# Patient Record
Sex: FEMALE | Race: WHITE | NOT HISPANIC OR LATINO | Employment: FULL TIME | URBAN - NONMETROPOLITAN AREA
[De-identification: names, ages, dates, MRNs, and addresses within clinical notes are randomized per-mention and may not be internally consistent; named-entity substitution may affect disease eponyms.]

---

## 2022-12-14 NOTE — PROGRESS NOTES
[facility] HEMATOLOGY & ONCOLOGY [address]  [facility] [address] [phone]    Patient Name: Tiff Elizalde  Encounter Date: 12/15/2022  YOB: 1965  Patient Number: [id number]    Initial Note    REASON FOR CONSULTATION:Tiff Elizalde is a pleasant 57 y.o.  female referred by HAILEE Walsh for diagnostic and management recommendations for ovarian cancer, left. She is seen to start adjuvant paclitaxel and carboplatin.  She is seen with spouse Dennis. History is obtained from patient. History is considered to be accurate.      HISTORY OF PRESENT ILLNESS: Pain lower back and left leg swelling.    She presented to her PCP.     CT scan done at [facility]. Details not available.    Patient seen by Dr. Virgilio Hayward 11/23/2022. Patient found to have a large pelvic mass causing bilateral hydronephrosis with resultant nonfunctioning left kidney, elevated creatinine, left deep venous thrombosis and pulmonary embolus.     IVC filter was placed 11/22/2022 at [facility].    She had undergone exploratory laparotomy, total abdominal hysterectomy, bilateral salpingo-oophorectomy, debulking, left pelvic lymph node dissection and omentectomy by Dr. Hayward on 11/23/2022.  Estimated blood loss 400 ml.  Laparotomy showed a massive mass arising from the left ovary, retroperitoneal, infiltrating the retroperitoneal space and adherent to the sigmoid mesentery.  Within the retroperitoneum it was densely adherent to the iliac vessel on the left side and ureter.  Deliberate rupture of the mass was required in order to access the retroperitoneal attachment.  The mass was sent for frozen section and determined to be at least borderline tumor possibly low-grade serous tumor.  There was no other evidence of disease in the pelvis or upper abdomen.  Due to being densely adherent to the retroperitoneum and iliac vessels, it is unclear  whether the entire tumor was removed or not and the pelvic sidewall biopsy was taken to determine whether the residual ovary was present and not a fibrotic reaction.  Bilateral ureterolysis was required.       Pathology report 11/28/2022.  Fallopian tube and ovary, left salpingo-oophorectomy: Fallopian tube with no evidence of involvement by malignancy.  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  No surface involvement demonstrated.  Uterus, fallopian tube and ovary, hysterectomy with right salpingo-oophorectomy: Cervix and endocervix with no evidence of squamous or glandular dysplasia.  Chronic cervicitis present.  Weakly proliferative endometrium, negative for hyperplasia, carcinoma, and endometritis.  Benign endometrial polyp present.  Adenomyosis.  No leiomyomata.  Right fallopian tube with focal involvement by serous neoplasm with Samona by this most consistent with low-grade serous carcinoma.  Remnant left ovary with adhesions to uterine serosa and focal involvement by low-grade serous carcinoma.  Right ovary with surface involvement by low-grade serous carcinoma.  Omentum biopsy: Involved by low-grade serous carcinoma, invasive).  Sidewall, left pelvic biopsy: Edema with hemorrhage and chronic inflammation, negative for endometriosis and malignancy.  Lymph node left pelvic biopsy: 1 lymph node with a 0.9 mm focus of metastatic serous carcinoma surrounding adipose tissue with hemorrhage and edema.       She has no prior malignancy and no family history of malignancy.      With above background, she is seen.        LABS    No results for input(s): HGB, HCT, MCV, WBC, RDW, MPV, PLT, AUTOIGPER, NEUTROABS, LYMPHSABS, MONOSABS, EOSABS, BASOSABS, AUTOIGNUM, NRBC, NEUTRELPCTM, MONOPCT, BASOPCT, ATYLMPCT, ANISOCYTOSIS, GIANTPLTS in the last 93736 hours.    Invalid input(s): LYMPHOCPCT, ABCMORPH    No results for input(s): GLUCOSE, NA, K, CO2, CL, ANIONGAP, CREATININE, BUN,  BCR, CALCIUM, EGFRIFNONA, ALKPHOS, PROTEINTOT, ALT, AST, BILITOT, ALBUMIN, GLOB in the last 12289 hours.    Invalid input(s): LABIL    No results for input(s): MSPIKE, KAPPALAMB, IGLFLC, URICACID, FREEKAPPAL, CEA, LDH, REFLABREPO in the last 97318 hours.    No results for input(s): IRON, TIBC, LABIRON, FERRITIN, H9KEMAO, TSH, FOLATE in the last 68462 hours.    Invalid input(s): VITB12      PAST MEDICAL HISTORY:  ALLERGIES:  No Known Allergies  CURRENT MEDICATIONS:  Outpatient Encounter Medications as of 12/15/2022   Medication Sig Dispense Refill   • Calcium Carbonate-Vit D-Min (CALCIUM 1200 PO) Take  by mouth.     • Eliquis 5 MG tablet tablet      • lisinopril-hydrochlorothiazide (PRINZIDE,ZESTORETIC) 10-12.5 MG per tablet Take 1 tablet by mouth Daily.     • vitamin D3 125 MCG (5000 UT) capsule capsule Take  by mouth Daily.       No facility-administered encounter medications on file as of 12/15/2022.     ADULT ILLNESSES:Hypetension.   Patient Active Problem List   Diagnosis Code   • Ovarian cancer (HCC) C56.9     SURGERIES:Tooth extraction.   No past surgical history on file.  HEALTH MAINTENANCE ITEMS:  Health Maintenance Due   Topic Date Due   • MAMMOGRAM  Never done   • COLORECTAL CANCER SCREENING  Never done   • COVID-19 Vaccine (1) Never done   • TDAP/TD VACCINES (2 - Tdap) 02/24/2008   • ZOSTER VACCINE (1 of 2) Never done   • INFLUENZA VACCINE  Never done   • HEPATITIS C SCREENING  Never done   • PAP SMEAR  Never done       <no information>  Last Completed Colonoscopy     This patient has no relevant Health Maintenance data.          There is no immunization history on file for this patient.  Last Completed Mammogram     This patient has no relevant Health Maintenance data.            FAMILY HISTORY:  No family history on file.  SOCIAL HISTORY:  Social History     Socioeconomic History   • Marital status:    Tobacco Use   • Smoking status: Never   Substance and Sexual Activity   • Alcohol use: Never  "  • Drug use: Never   • Sexual activity: Defer       REVIEW OF SYSTEMS:  Review of Systems   Constitutional: Positive for fatigue. Negative for chills and fever.   HENT: Negative for congestion, mouth sores and trouble swallowing.    Eyes: Negative for redness and visual disturbance.   Respiratory: Negative for cough, shortness of breath and wheezing.    Cardiovascular: Positive for leg swelling. Negative for chest pain.   Gastrointestinal: Negative for abdominal pain, nausea and vomiting.   Endocrine: Negative for polydipsia and polyphagia.   Genitourinary: Negative for difficulty urinating, dysuria and flank pain.   Musculoskeletal: Negative for gait problem and joint swelling.   Skin: Positive for pallor.   Allergic/Immunologic: Negative for food allergies.   Neurological: Negative for speech difficulty, weakness and confusion.   Hematological: Negative for adenopathy. Does not bruise/bleed easily.   Psychiatric/Behavioral: Negative for agitation and behavioral problems. The patient is not nervous/anxious.        /66   Pulse 82   Temp 97.6 °F (36.4 °C)   Resp 18   Ht 170.2 cm (67\")   Wt 79.4 kg (175 lb)   SpO2 92%   Breastfeeding No   BMI 27.41 kg/m²  Body surface area is 1.91 meters squared.  Pain Score    12/15/22 1328   PainSc: 0-No pain       Physical Exam:  Physical Exam  Vitals reviewed.   Constitutional:       General: She is not in acute distress.  HENT:      Head: Normocephalic and atraumatic.   Eyes:      General: No scleral icterus.  Pulmonary:      Effort: No respiratory distress.      Breath sounds: No wheezing or rales.   Abdominal:      General: Bowel sounds are normal.      Palpations: Abdomen is soft.      Tenderness: There is no abdominal tenderness.      Comments: Laparotomy scar.    Musculoskeletal:      Cervical back: Neck supple.      Comments: 2 + left leg edema.   Skin:     General: Skin is warm.      Coloration: Skin is pale.   Neurological:      Mental Status: She is alert " "and oriented to person, place, and time.   Psychiatric:         Mood and Affect: Mood normal.         Behavior: Behavior normal.         Thought Content: Thought content normal.         Judgment: Judgment normal.         Tiff Elizalde reports a pain score of 0.       ASSESSMENT:  1.  Ovarian cancer, low-grade serous carcinoma, left.  Tumor size 18 cm  AJCC stage:IIIA1 (pT3, pN1, cM0, G1)  Treatment status:Adjuvant Taxol and carboplatin pending.  2.  Performance status of 1.  3.  Left deep venous thrombosis from malignancy. On Eliquis.   4.  Pulm embolism from malignancy. On Eliquis.  Post IVC filter 11/22/2022.         PLAN:  1.    Re: Reason for the referral.   2.    Re: Role of adjuvant paclitaxel and carboplatin for 6 cycles  3.    Re: Port placement.   4.   Aware of potential adverse effects of chemo especially anaphylaxis, infusion reactions, nausea, vomiting, cytopenias, neuropathy, and alopecia.  5.   Port by Dr. Mcgill.  6.   Blood for CBC with differential, , CMP, and magnesium.  7.   Chemo education.  8.   Schedule chemo every 21 days after port.  Plan for 6 cycles.    Carboplatin AUC = 6.  Taxol 175 mg/m2 over 3 hours.   9.    Premedicate with:  Aloxi 0.25 mg IV  Emend 150 mg IV  Decadron 12 mg IV  Pepcid 20 mg IV  Benadryl 50 mg IV  10. Neulasta Onpro 6 mg D1 of chemo as primary prophylaxis. High risk for febrile neutropenia.    11.  Weekly CBC with differential, CMP, and magnesium once she starts chemo.  12.  eRx for Zofran 8 mg p.o. every 8 hours as needed for nausea and vomiting #60, 2 refills.  13.  eRx for Compazine 10 mg p.o. every 4 hours as needed for nausea and vomiting #60, 2 refills.  14.  Continue ongoing management per primary care physician and other specialists.  15.  Plan of care discussed with patient and spouse.  Understanding expressed.  Patient agreeable to proceed.  16.  Questions were answered to her satisfaction. \"Okay.\"  17.  Return to office in 4 " weeks.  18.  Further recommendations pending.   19.  Obtain CT scans from [facility].        Thank you for the referral.          I have reviewed the assessment and plan and verified the accuracy of it. No changes to assessment and plan since the information was documented. Walker Magana MD 12/15/22              I spent 67 total minutes, face-to-face, caring for Tiff today.  Greater than 50% of this time involved counseling and/or coordination of care as documented within this note regarding the patient's illness(es), pros and cons of various treatment options, instructions and/or risk reduction.           MD Virgilio Reyes MD Jonathan Maddux, MD

## 2022-12-15 ENCOUNTER — TELEPHONE (OUTPATIENT)
Dept: ONCOLOGY | Facility: CLINIC | Age: 57
End: 2022-12-15

## 2022-12-15 ENCOUNTER — LAB (OUTPATIENT)
Dept: LAB | Facility: HOSPITAL | Age: 57
End: 2022-12-15

## 2022-12-15 ENCOUNTER — CONSULT (OUTPATIENT)
Dept: ONCOLOGY | Facility: CLINIC | Age: 57
End: 2022-12-15

## 2022-12-15 VITALS
HEIGHT: 67 IN | DIASTOLIC BLOOD PRESSURE: 66 MMHG | SYSTOLIC BLOOD PRESSURE: 138 MMHG | OXYGEN SATURATION: 92 % | TEMPERATURE: 97.6 F | WEIGHT: 175 LBS | RESPIRATION RATE: 18 BRPM | BODY MASS INDEX: 27.47 KG/M2 | HEART RATE: 82 BPM

## 2022-12-15 DIAGNOSIS — D50.0 IRON DEFICIENCY ANEMIA DUE TO CHRONIC BLOOD LOSS: Primary | ICD-10-CM

## 2022-12-15 DIAGNOSIS — C56.2 MALIGNANT NEOPLASM OF LEFT OVARY: Primary | ICD-10-CM

## 2022-12-15 DIAGNOSIS — C56.2 MALIGNANT NEOPLASM OF LEFT OVARY: ICD-10-CM

## 2022-12-15 PROBLEM — C56.9 OVARIAN CANCER: Status: ACTIVE | Noted: 2022-12-15

## 2022-12-15 LAB
ALBUMIN SERPL-MCNC: 3.9 G/DL (ref 3.5–5.2)
ALBUMIN/GLOB SERPL: 1.1 G/DL
ALP SERPL-CCNC: 79 U/L (ref 39–117)
ALT SERPL W P-5'-P-CCNC: 9 U/L (ref 1–33)
ANION GAP SERPL CALCULATED.3IONS-SCNC: 10 MMOL/L (ref 5–15)
AST SERPL-CCNC: 12 U/L (ref 1–32)
BASOPHILS # BLD AUTO: 0.06 10*3/MM3 (ref 0–0.2)
BASOPHILS NFR BLD AUTO: 0.6 % (ref 0–1.5)
BILIRUB SERPL-MCNC: 0.3 MG/DL (ref 0–1.2)
BUN SERPL-MCNC: 22 MG/DL (ref 6–20)
BUN/CREAT SERPL: 13 (ref 7–25)
CALCIUM SPEC-SCNC: 10 MG/DL (ref 8.6–10.5)
CHLORIDE SERPL-SCNC: 104 MMOL/L (ref 98–107)
CO2 SERPL-SCNC: 28 MMOL/L (ref 22–29)
CREAT SERPL-MCNC: 1.69 MG/DL (ref 0.57–1)
DEPRECATED RDW RBC AUTO: 44 FL (ref 37–54)
EGFRCR SERPLBLD CKD-EPI 2021: 34.6 ML/MIN/1.73
EOSINOPHIL # BLD AUTO: 0.18 10*3/MM3 (ref 0–0.4)
EOSINOPHIL NFR BLD AUTO: 1.9 % (ref 0.3–6.2)
ERYTHROCYTE [DISTWIDTH] IN BLOOD BY AUTOMATED COUNT: 13.2 % (ref 12.3–15.4)
GLOBULIN UR ELPH-MCNC: 3.6 GM/DL
GLUCOSE SERPL-MCNC: 106 MG/DL (ref 65–99)
HCT VFR BLD AUTO: 36.7 % (ref 34–46.6)
HGB BLD-MCNC: 11.4 G/DL (ref 12–15.9)
IMM GRANULOCYTES # BLD AUTO: 0.04 10*3/MM3 (ref 0–0.05)
IMM GRANULOCYTES NFR BLD AUTO: 0.4 % (ref 0–0.5)
LYMPHOCYTES # BLD AUTO: 2.22 10*3/MM3 (ref 0.7–3.1)
LYMPHOCYTES NFR BLD AUTO: 23 % (ref 19.6–45.3)
MAGNESIUM SERPL-MCNC: 2.1 MG/DL (ref 1.6–2.6)
MCH RBC QN AUTO: 28.1 PG (ref 26.6–33)
MCHC RBC AUTO-ENTMCNC: 31.1 G/DL (ref 31.5–35.7)
MCV RBC AUTO: 90.6 FL (ref 79–97)
MONOCYTES # BLD AUTO: 0.79 10*3/MM3 (ref 0.1–0.9)
MONOCYTES NFR BLD AUTO: 8.2 % (ref 5–12)
NEUTROPHILS NFR BLD AUTO: 6.37 10*3/MM3 (ref 1.7–7)
NEUTROPHILS NFR BLD AUTO: 65.9 % (ref 42.7–76)
NRBC BLD AUTO-RTO: 0 /100 WBC (ref 0–0.2)
PLATELET # BLD AUTO: 313 10*3/MM3 (ref 140–450)
PMV BLD AUTO: 10.2 FL (ref 6–12)
POTASSIUM SERPL-SCNC: 4.2 MMOL/L (ref 3.5–5.2)
PROT SERPL-MCNC: 7.5 G/DL (ref 6–8.5)
RBC # BLD AUTO: 4.05 10*6/MM3 (ref 3.77–5.28)
SODIUM SERPL-SCNC: 142 MMOL/L (ref 136–145)
WBC NRBC COR # BLD: 9.66 10*3/MM3 (ref 3.4–10.8)

## 2022-12-15 PROCEDURE — 86304 IMMUNOASSAY TUMOR CA 125: CPT

## 2022-12-15 PROCEDURE — 80053 COMPREHEN METABOLIC PANEL: CPT | Performed by: INTERNAL MEDICINE

## 2022-12-15 PROCEDURE — 83735 ASSAY OF MAGNESIUM: CPT | Performed by: INTERNAL MEDICINE

## 2022-12-15 PROCEDURE — 36415 COLL VENOUS BLD VENIPUNCTURE: CPT

## 2022-12-15 PROCEDURE — 85025 COMPLETE CBC W/AUTO DIFF WBC: CPT | Performed by: INTERNAL MEDICINE

## 2022-12-15 PROCEDURE — 99205 OFFICE O/P NEW HI 60 MIN: CPT | Performed by: INTERNAL MEDICINE

## 2022-12-15 RX ORDER — APIXABAN 5 MG/1
5 TABLET, FILM COATED ORAL EVERY 12 HOURS SCHEDULED
COMMUNITY
Start: 2022-11-28

## 2022-12-15 RX ORDER — LISINOPRIL AND HYDROCHLOROTHIAZIDE 12.5; 1 MG/1; MG/1
1 TABLET ORAL DAILY
COMMUNITY
End: 2022-12-28

## 2022-12-15 RX ORDER — PROCHLORPERAZINE MALEATE 10 MG
10 TABLET ORAL EVERY 6 HOURS PRN
Qty: 60 TABLET | Refills: 2 | Status: SHIPPED | OUTPATIENT
Start: 2022-12-15

## 2022-12-15 RX ORDER — ONDANSETRON HYDROCHLORIDE 8 MG/1
8 TABLET, FILM COATED ORAL EVERY 8 HOURS PRN
Qty: 60 TABLET | Refills: 2 | Status: SHIPPED | OUTPATIENT
Start: 2022-12-15

## 2022-12-16 LAB — CANCER AG125 SERPL QL: 235 U/ML (ref 0–38.1)

## 2022-12-16 RX ORDER — LISINOPRIL AND HYDROCHLOROTHIAZIDE 20; 12.5 MG/1; MG/1
1 TABLET ORAL DAILY
COMMUNITY

## 2022-12-20 ENCOUNTER — OFFICE VISIT (OUTPATIENT)
Dept: SURGERY | Facility: CLINIC | Age: 57
End: 2022-12-20

## 2022-12-20 ENCOUNTER — CLINICAL SUPPORT (OUTPATIENT)
Dept: ONCOLOGY | Facility: CLINIC | Age: 57
End: 2022-12-20

## 2022-12-20 VITALS
BODY MASS INDEX: 27.47 KG/M2 | SYSTOLIC BLOOD PRESSURE: 118 MMHG | DIASTOLIC BLOOD PRESSURE: 71 MMHG | HEIGHT: 67 IN | HEART RATE: 97 BPM | OXYGEN SATURATION: 99 % | TEMPERATURE: 98 F | WEIGHT: 175 LBS

## 2022-12-20 DIAGNOSIS — C56.9 MALIGNANT NEOPLASM OF OVARY, UNSPECIFIED LATERALITY: Primary | ICD-10-CM

## 2022-12-20 DIAGNOSIS — I82.4Y9 DEEP VEIN THROMBOSIS (DVT) OF PROXIMAL LOWER EXTREMITY, UNSPECIFIED CHRONICITY, UNSPECIFIED LATERALITY: ICD-10-CM

## 2022-12-20 DIAGNOSIS — C56.2 MALIGNANT NEOPLASM OF LEFT OVARY: ICD-10-CM

## 2022-12-20 DIAGNOSIS — I26.99 PULMONARY EMBOLISM WITHOUT ACUTE COR PULMONALE, UNSPECIFIED CHRONICITY, UNSPECIFIED PULMONARY EMBOLISM TYPE: ICD-10-CM

## 2022-12-20 PROCEDURE — 99203 OFFICE O/P NEW LOW 30 MIN: CPT | Performed by: SPECIALIST

## 2022-12-20 RX ORDER — IBUPROFEN 800 MG/1
TABLET ORAL
COMMUNITY
Start: 2022-11-28 | End: 2022-12-28

## 2022-12-20 NOTE — PROGRESS NOTES
Subjective     PATIENT NAME:  Tiff Elizalde  YOB: 1965  PATIENTS AGE:  57 y.o.  PATIENTS SEX:  female  DATE OF SERVICE:  12/20/2022  PROVIDER:  [facility] PAD NURSE      ____________________PATIENT EDUCATION____________________    PATIENT EDUCATION:  Today I met with the patient Tiff and  Dennis to discuss the chemotherapy regimen Carboplatin, Taxol, Neulasta recommended for treatment of her disease Ovarian Cancer.  The patient was given explanation of treatment premed side effects including office policy that prohibits patients to drive if sedating medications are administered, MD explanation given regarding benefits, side effects, toxicities and goals of treatment.  The patient received a Chemotherapy/Biotherapy Plan Summary including diagnosis and specific treatment plan.    SIDE EFFECTS:  Common side effects were discussed with the patient and/or significant other.  Discussion included hair loss/discoloration, anemia/fatigue, infection/chills/fever, appetite, bleeding risk/precautions, constipation, diarrhea, mouth sores, taste alteration, loss of appetite,nausea/vomiting, peripheral neuropathy, skin/nail changes, rash, muscle aches/weakness, photosensitivity, weight gain/loss, hearing loss, dizziness, sterility, high blood pressure, heart damage, liver damage, lung damage, kidney damage, DVT/PE risk, fluid retention, pleural/pericardial effusion, somnolence, electrolyte/LFT imbalance, vein exercises and/or the possible need for vascular access/port placement.  The patient was advice that although uncommon, leakage of an infused medication from the vein or venous access device (port) may lead to skin breakdown and/or other tissue damage.  The patient was advised that he/she may have pain, bleeding, and/or bruising from the insertion of a needle in their vein or venous access device (port).  The patient was further advised that, in spite of proper technique, infection with redness and  irritation may rarely occur at the site where the needle was inserted.  The patient was advised that if complications occur, additional medical treatment is available.    Discussion also included side effects specific to drugs in the treatment plan, specifically: Carboplatin/Taxol, Neulasta      protection during sexual relations.    A total of  60  minutes were spent with the patient, with 100% of time spent in education and counseling.
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
## 2022-12-20 NOTE — PROGRESS NOTES
Patient: Tiff Elizalde    YOB: 1965    Date: 12/20/2022    Primary Care Provider: Tyrone Garza MD    Chief Complaint   Patient presents with   • Port Placement        Subjective .     History of present illness:   Patient was having swelling in her left leg for which he underwent a CT scan which showed a large pelvic mass impinging on the left kidney causing hydronephrosis.  She ultimately underwent resection for low-grade ovarian cancer.  She had a DVT with pulmonary embolus as well.  She has an IVC filter in place and is on Eliquis.  I have asked to evaluate for a port placement    The following portions of the patient's history were reviewed and updated as appropriate: allergies, current medications, past family history, past medical history, past social history, past surgical history and problem list.      History:  Past Medical History:   Diagnosis Date   • Ovarian cancer (HCC) 12/15/2022        History reviewed. No pertinent surgical history.    History reviewed. No pertinent family history.    Social History     Tobacco Use   • Smoking status: Never   Substance Use Topics   • Alcohol use: Never   • Drug use: Never       Allergies:  No Known Allergies    Medications:     Current Outpatient Medications:   •  Calcium Carbonate-Vit D-Min (CALCIUM 1200 PO), Take  by mouth., Disp: , Rfl:   •  Eliquis 5 MG tablet tablet, , Disp: , Rfl:   •  ibuprofen (ADVIL,MOTRIN) 800 MG tablet, , Disp: , Rfl:   •  lisinopril-hydrochlorothiazide (PRINZIDE,ZESTORETIC) 10-12.5 MG per tablet, Take 1 tablet by mouth Daily., Disp: , Rfl:   •  lisinopril-hydrochlorothiazide (PRINZIDE,ZESTORETIC) 20-12.5 MG per tablet, Take 1 tablet by mouth Daily., Disp: , Rfl:   •  ondansetron (Zofran) 8 MG tablet, Take 1 tablet by mouth Every 8 (Eight) Hours As Needed for Nausea or Vomiting., Disp: 60 tablet, Rfl: 2  •  prochlorperazine (COMPAZINE) 10 MG tablet, Take 1 tablet by mouth Every 6 (Six) Hours As Needed for Nausea.,  "Disp: 60 tablet, Rfl: 2  •  vitamin D3 125 MCG (5000 UT) capsule capsule, Take  by mouth Daily., Disp: , Rfl:     Objective     Vital Signs:   Vitals:    12/20/22 1205   BP: 118/71   Pulse: 97   Temp: 98 °F (36.7 °C)   SpO2: 99%   Weight: 79.4 kg (175 lb)   Height: 170.2 cm (67\")       Physical Exam:     General Appearance:    Alert, cooperative, in no acute distress   Head:    Normocephalic, without obvious abnormality, atraumatic   Eyes:            Lids and lashes normal, conjunctivae and sclerae normal, no  icterus, no pallor, corneas clear,   Ears:    Ears appear intact with no abnormalities noted   Breast:     deferred   Lungs:     Clear to auscultation,respirations regular, even and              Unlabored    Heart:    Regular rhythm and normal rate, no murmur, no gallop.   Chest Wall:    No abnormalities observed   Abdomen    Rectal:    Soft nontender    Deferred   Extremities:  Does have swelling asymmetric left leg more than right   Pulses:   Pulses palpable and equal bilaterally   Skin:   No bleeding, bruising or rash   Lymph nodes:   No palpable adenopathy   Neurologic:   Cranial nerves 2 - 12 grossly intact.         Results Review:   I reviewed the patient's new clinical results.        Assessment / Plan:    Diagnoses and all orders for this visit:    1. Malignant neoplasm of ovary, unspecified laterality (HCC) (Primary)  -     Case Request; Standing  -     Comprehensive metabolic panel; Future  -     Lipase; Future  -     XR chest 1 vw; Future  -     ceFAZolin (ANCEF) 2 g in sodium chloride 0.9 % 100 mL IVPB  -     Case Request    2. Deep vein thrombosis (DVT) of proximal lower extremity, unspecified chronicity, unspecified laterality (HCC)    3. Pulmonary embolism without acute cor pulmonale, unspecified chronicity, unspecified pulmonary embolism type (HCC)    Other orders  -     Follow Anesthesia Guidelines / Protocol; Future  -     Obtain Informed Consent; Future  -     Provide NPO Instructions to " Patient; Future  -     Chlorhexidine Skin Prep; Future  -     Follow Anesthesia Guidelines / Protocol; Standing  -     Verify / Perform Chlorhexidine Skin Prep; Standing  -     Verify / Perform Chlorhexidine Skin Prep if Indicated (If Not Already Completed); Standing  -     Oxygen Therapy-; Standing  -     Notify physician (specify); Standing        BMI is >= 25 and <30. (Overweight) The following options were offered after discussion;: none (medical contraindication)    Plan she needs a port for chemotherapy.  She has been referred by Dr. Magana.  We will proceed with port placement in the OR next week.  She is to be off her Eliquis for 3 days.  The risks of bleeding infection injury to structures or the very unlikely scenario of difficulty due to the IVC filter all discussed.  All questions answered.      Electronically signed by Holley Mcgill MD  12/20/22  15:11 CST

## 2022-12-28 ENCOUNTER — PRE-ADMISSION TESTING (OUTPATIENT)
Dept: PREADMISSION TESTING | Facility: HOSPITAL | Age: 57
End: 2022-12-28
Payer: COMMERCIAL

## 2022-12-28 ENCOUNTER — HOSPITAL ENCOUNTER (OUTPATIENT)
Dept: GENERAL RADIOLOGY | Facility: HOSPITAL | Age: 57
Discharge: HOME | End: 2022-12-28
Payer: COMMERCIAL

## 2022-12-28 VITALS
HEIGHT: 67 IN | RESPIRATION RATE: 18 BRPM | OXYGEN SATURATION: 100 % | DIASTOLIC BLOOD PRESSURE: 60 MMHG | SYSTOLIC BLOOD PRESSURE: 136 MMHG | WEIGHT: 176.81 LBS | BODY MASS INDEX: 27.75 KG/M2 | HEART RATE: 73 BPM

## 2022-12-28 DIAGNOSIS — C56.9 MALIGNANT NEOPLASM OF OVARY, UNSPECIFIED LATERALITY: ICD-10-CM

## 2022-12-28 LAB
ALBUMIN SERPL-MCNC: 4.2 G/DL (ref 3.5–5.2)
ALBUMIN/GLOB SERPL: 1.3 G/DL
ALP SERPL-CCNC: 68 U/L (ref 39–117)
ALT SERPL W P-5'-P-CCNC: 14 U/L (ref 1–33)
ANION GAP SERPL CALCULATED.3IONS-SCNC: 10 MMOL/L (ref 5–15)
AST SERPL-CCNC: 18 U/L (ref 1–32)
BILIRUB SERPL-MCNC: 0.2 MG/DL (ref 0–1.2)
BUN SERPL-MCNC: 17 MG/DL (ref 6–20)
BUN/CREAT SERPL: 13.2 (ref 7–25)
CALCIUM SPEC-SCNC: 9.8 MG/DL (ref 8.6–10.5)
CHLORIDE SERPL-SCNC: 104 MMOL/L (ref 98–107)
CO2 SERPL-SCNC: 30 MMOL/L (ref 22–29)
CREAT SERPL-MCNC: 1.29 MG/DL (ref 0.57–1)
DEPRECATED RDW RBC AUTO: 43.3 FL (ref 37–54)
EGFRCR SERPLBLD CKD-EPI 2021: 47.9 ML/MIN/1.73
ERYTHROCYTE [DISTWIDTH] IN BLOOD BY AUTOMATED COUNT: 13.3 % (ref 12.3–15.4)
GLOBULIN UR ELPH-MCNC: 3.2 GM/DL
GLUCOSE SERPL-MCNC: 97 MG/DL (ref 65–99)
HCT VFR BLD AUTO: 34.7 % (ref 34–46.6)
HGB BLD-MCNC: 11.3 G/DL (ref 12–15.9)
LIPASE SERPL-CCNC: 28 U/L (ref 13–60)
MCH RBC QN AUTO: 28.7 PG (ref 26.6–33)
MCHC RBC AUTO-ENTMCNC: 32.6 G/DL (ref 31.5–35.7)
MCV RBC AUTO: 88.1 FL (ref 79–97)
PLATELET # BLD AUTO: 308 10*3/MM3 (ref 140–450)
PMV BLD AUTO: 9.6 FL (ref 6–12)
POTASSIUM SERPL-SCNC: 3.8 MMOL/L (ref 3.5–5.2)
PROT SERPL-MCNC: 7.4 G/DL (ref 6–8.5)
RBC # BLD AUTO: 3.94 10*6/MM3 (ref 3.77–5.28)
SODIUM SERPL-SCNC: 144 MMOL/L (ref 136–145)
WBC NRBC COR # BLD: 8.87 10*3/MM3 (ref 3.4–10.8)

## 2022-12-28 PROCEDURE — 85027 COMPLETE CBC AUTOMATED: CPT

## 2022-12-28 PROCEDURE — 80053 COMPREHEN METABOLIC PANEL: CPT

## 2022-12-28 PROCEDURE — 83690 ASSAY OF LIPASE: CPT

## 2022-12-28 PROCEDURE — 36415 COLL VENOUS BLD VENIPUNCTURE: CPT

## 2022-12-28 PROCEDURE — 71045 X-RAY EXAM CHEST 1 VIEW: CPT

## 2022-12-29 ENCOUNTER — TELEPHONE (OUTPATIENT)
Dept: ONCOLOGY | Facility: CLINIC | Age: 57
End: 2022-12-29

## 2022-12-29 NOTE — TELEPHONE ENCOUNTER
Patient called to let us know that her port was being put in tomorrow. And hadn't heard about getting her chemo started. So I called and got that scheduled for her. She will start chemo 1/9/2023. Patient verbalized understanding.

## 2022-12-30 ENCOUNTER — ANESTHESIA (OUTPATIENT)
Dept: PERIOP | Facility: HOSPITAL | Age: 57
End: 2022-12-30
Payer: COMMERCIAL

## 2022-12-30 ENCOUNTER — APPOINTMENT (OUTPATIENT)
Dept: GENERAL RADIOLOGY | Facility: HOSPITAL | Age: 57
End: 2022-12-30
Payer: COMMERCIAL

## 2022-12-30 ENCOUNTER — HOSPITAL ENCOUNTER (OUTPATIENT)
Facility: HOSPITAL | Age: 57
Setting detail: HOSPITAL OUTPATIENT SURGERY
Discharge: HOME | End: 2022-12-30
Attending: SPECIALIST | Admitting: SPECIALIST
Payer: COMMERCIAL

## 2022-12-30 ENCOUNTER — ANESTHESIA EVENT (OUTPATIENT)
Dept: PERIOP | Facility: HOSPITAL | Age: 57
End: 2022-12-30
Payer: COMMERCIAL

## 2022-12-30 VITALS
HEART RATE: 62 BPM | DIASTOLIC BLOOD PRESSURE: 63 MMHG | TEMPERATURE: 98.3 F | RESPIRATION RATE: 16 BRPM | OXYGEN SATURATION: 100 % | SYSTOLIC BLOOD PRESSURE: 111 MMHG

## 2022-12-30 DIAGNOSIS — C56.9 MALIGNANT NEOPLASM OF OVARY, UNSPECIFIED LATERALITY: ICD-10-CM

## 2022-12-30 PROCEDURE — 25010000002 VANCOMYCIN 1 G RECONSTITUTED SOLUTION 1 EACH VIAL: Performed by: SPECIALIST

## 2022-12-30 PROCEDURE — C1788 PORT, INDWELLING, IMP: HCPCS | Performed by: SPECIALIST

## 2022-12-30 PROCEDURE — 36561 INSERT TUNNELED CV CATH: CPT | Performed by: SPECIALIST

## 2022-12-30 PROCEDURE — 25010000002 FENTANYL CITRATE (PF) 100 MCG/2ML SOLUTION

## 2022-12-30 PROCEDURE — 71045 X-RAY EXAM CHEST 1 VIEW: CPT

## 2022-12-30 PROCEDURE — 77001 FLUOROGUIDE FOR VEIN DEVICE: CPT

## 2022-12-30 PROCEDURE — 76000 FLUOROSCOPY <1 HR PHYS/QHP: CPT

## 2022-12-30 PROCEDURE — 25010000002 HEPARIN LOCK FLUSH PER 10 UNITS: Performed by: SPECIALIST

## 2022-12-30 PROCEDURE — 25010000002 CEFAZOLIN PER 500 MG: Performed by: SPECIALIST

## 2022-12-30 PROCEDURE — 77001 FLUOROGUIDE FOR VEIN DEVICE: CPT | Performed by: SPECIALIST

## 2022-12-30 PROCEDURE — 25010000002 PROPOFOL 1000 MG/100ML EMULSION

## 2022-12-30 PROCEDURE — 0 LIDOCAINE 1 % SOLUTION: Performed by: SPECIALIST

## 2022-12-30 DEVICE — PRT INTRO VASC/INTERV VORTEX FILL/HL DETACH/POLYURET/CATH 8F: Type: IMPLANTABLE DEVICE | Site: CHEST | Status: FUNCTIONAL

## 2022-12-30 RX ORDER — SODIUM CHLORIDE 0.9 % (FLUSH) 0.9 %
3-10 SYRINGE (ML) INJECTION AS NEEDED
Status: DISCONTINUED | OUTPATIENT
Start: 2022-12-30 | End: 2022-12-30 | Stop reason: HOSPADM

## 2022-12-30 RX ORDER — ONDANSETRON 2 MG/ML
4 INJECTION INTRAMUSCULAR; INTRAVENOUS ONCE AS NEEDED
Status: DISCONTINUED | OUTPATIENT
Start: 2022-12-30 | End: 2022-12-30 | Stop reason: HOSPADM

## 2022-12-30 RX ORDER — HYDROCODONE BITARTRATE AND ACETAMINOPHEN 7.5; 325 MG/1; MG/1
1 TABLET ORAL EVERY 4 HOURS PRN
Qty: 15 TABLET | Refills: 0 | Status: SHIPPED | OUTPATIENT
Start: 2022-12-30

## 2022-12-30 RX ORDER — PROPOFOL 10 MG/ML
INJECTION, EMULSION INTRAVENOUS CONTINUOUS PRN
Status: DISCONTINUED | OUTPATIENT
Start: 2022-12-30 | End: 2022-12-30 | Stop reason: SURG

## 2022-12-30 RX ORDER — LIDOCAINE HYDROCHLORIDE 10 MG/ML
0.5 INJECTION, SOLUTION EPIDURAL; INFILTRATION; INTRACAUDAL; PERINEURAL ONCE AS NEEDED
Status: DISCONTINUED | OUTPATIENT
Start: 2022-12-30 | End: 2022-12-30 | Stop reason: HOSPADM

## 2022-12-30 RX ORDER — SODIUM CHLORIDE 0.9 % (FLUSH) 0.9 %
3 SYRINGE (ML) INJECTION AS NEEDED
Status: DISCONTINUED | OUTPATIENT
Start: 2022-12-30 | End: 2022-12-30 | Stop reason: HOSPADM

## 2022-12-30 RX ORDER — MIDAZOLAM HYDROCHLORIDE 1 MG/ML
1 INJECTION INTRAMUSCULAR; INTRAVENOUS
Status: DISCONTINUED | OUTPATIENT
Start: 2022-12-30 | End: 2022-12-30 | Stop reason: HOSPADM

## 2022-12-30 RX ORDER — SODIUM CHLORIDE 9 MG/ML
40 INJECTION, SOLUTION INTRAVENOUS AS NEEDED
Status: DISCONTINUED | OUTPATIENT
Start: 2022-12-30 | End: 2022-12-30 | Stop reason: HOSPADM

## 2022-12-30 RX ORDER — NALOXONE HCL 0.4 MG/ML
0.4 VIAL (ML) INJECTION AS NEEDED
Status: DISCONTINUED | OUTPATIENT
Start: 2022-12-30 | End: 2022-12-30 | Stop reason: HOSPADM

## 2022-12-30 RX ORDER — DROPERIDOL 2.5 MG/ML
0.62 INJECTION, SOLUTION INTRAMUSCULAR; INTRAVENOUS ONCE AS NEEDED
Status: DISCONTINUED | OUTPATIENT
Start: 2022-12-30 | End: 2022-12-30 | Stop reason: HOSPADM

## 2022-12-30 RX ORDER — SODIUM CHLORIDE, SODIUM LACTATE, POTASSIUM CHLORIDE, CALCIUM CHLORIDE 600; 310; 30; 20 MG/100ML; MG/100ML; MG/100ML; MG/100ML
100 INJECTION, SOLUTION INTRAVENOUS CONTINUOUS
Status: DISCONTINUED | OUTPATIENT
Start: 2022-12-30 | End: 2022-12-30 | Stop reason: HOSPADM

## 2022-12-30 RX ORDER — LABETALOL HYDROCHLORIDE 5 MG/ML
5 INJECTION, SOLUTION INTRAVENOUS
Status: DISCONTINUED | OUTPATIENT
Start: 2022-12-30 | End: 2022-12-30 | Stop reason: HOSPADM

## 2022-12-30 RX ORDER — FENTANYL CITRATE 50 UG/ML
INJECTION, SOLUTION INTRAMUSCULAR; INTRAVENOUS AS NEEDED
Status: DISCONTINUED | OUTPATIENT
Start: 2022-12-30 | End: 2022-12-30 | Stop reason: SURG

## 2022-12-30 RX ORDER — FLUMAZENIL 0.1 MG/ML
0.2 INJECTION INTRAVENOUS AS NEEDED
Status: DISCONTINUED | OUTPATIENT
Start: 2022-12-30 | End: 2022-12-30 | Stop reason: HOSPADM

## 2022-12-30 RX ORDER — OXYCODONE AND ACETAMINOPHEN 7.5; 325 MG/1; MG/1
2 TABLET ORAL EVERY 4 HOURS PRN
Status: DISCONTINUED | OUTPATIENT
Start: 2022-12-30 | End: 2022-12-30 | Stop reason: HOSPADM

## 2022-12-30 RX ORDER — ONDANSETRON 4 MG/1
4 TABLET, FILM COATED ORAL ONCE AS NEEDED
Status: DISCONTINUED | OUTPATIENT
Start: 2022-12-30 | End: 2022-12-30 | Stop reason: HOSPADM

## 2022-12-30 RX ORDER — SODIUM CHLORIDE 0.9 % (FLUSH) 0.9 %
3 SYRINGE (ML) INJECTION EVERY 12 HOURS SCHEDULED
Status: DISCONTINUED | OUTPATIENT
Start: 2022-12-30 | End: 2022-12-30 | Stop reason: HOSPADM

## 2022-12-30 RX ORDER — BUPIVACAINE HCL/0.9 % NACL/PF 0.1 %
2 PLASTIC BAG, INJECTION (ML) EPIDURAL ONCE
Status: COMPLETED | OUTPATIENT
Start: 2022-12-30 | End: 2022-12-30

## 2022-12-30 RX ORDER — HEPARIN SODIUM (PORCINE) LOCK FLUSH IV SOLN 100 UNIT/ML 100 UNIT/ML
SOLUTION INTRAVENOUS AS NEEDED
Status: DISCONTINUED | OUTPATIENT
Start: 2022-12-30 | End: 2022-12-30 | Stop reason: HOSPADM

## 2022-12-30 RX ORDER — OXYCODONE AND ACETAMINOPHEN 10; 325 MG/1; MG/1
1 TABLET ORAL ONCE AS NEEDED
Status: DISCONTINUED | OUTPATIENT
Start: 2022-12-30 | End: 2022-12-30 | Stop reason: HOSPADM

## 2022-12-30 RX ORDER — FENTANYL CITRATE 50 UG/ML
25 INJECTION, SOLUTION INTRAMUSCULAR; INTRAVENOUS
Status: DISCONTINUED | OUTPATIENT
Start: 2022-12-30 | End: 2022-12-30 | Stop reason: HOSPADM

## 2022-12-30 RX ORDER — ACETAMINOPHEN 500 MG
1000 TABLET ORAL ONCE
Status: DISCONTINUED | OUTPATIENT
Start: 2022-12-30 | End: 2022-12-30

## 2022-12-30 RX ORDER — SODIUM CHLORIDE, SODIUM LACTATE, POTASSIUM CHLORIDE, CALCIUM CHLORIDE 600; 310; 30; 20 MG/100ML; MG/100ML; MG/100ML; MG/100ML
1000 INJECTION, SOLUTION INTRAVENOUS CONTINUOUS
Status: DISCONTINUED | OUTPATIENT
Start: 2022-12-30 | End: 2022-12-30 | Stop reason: HOSPADM

## 2022-12-30 RX ORDER — HYDROMORPHONE HYDROCHLORIDE 1 MG/ML
0.5 INJECTION, SOLUTION INTRAMUSCULAR; INTRAVENOUS; SUBCUTANEOUS
Status: DISCONTINUED | OUTPATIENT
Start: 2022-12-30 | End: 2022-12-30 | Stop reason: HOSPADM

## 2022-12-30 RX ORDER — BUPIVACAINE HCL/0.9 % NACL/PF 0.125 %
PLASTIC BAG, INJECTION (ML) EPIDURAL AS NEEDED
Status: DISCONTINUED | OUTPATIENT
Start: 2022-12-30 | End: 2022-12-30 | Stop reason: SURG

## 2022-12-30 RX ORDER — IBUPROFEN 600 MG/1
600 TABLET ORAL ONCE AS NEEDED
Status: DISCONTINUED | OUTPATIENT
Start: 2022-12-30 | End: 2022-12-30 | Stop reason: HOSPADM

## 2022-12-30 RX ORDER — LIDOCAINE HYDROCHLORIDE 10 MG/ML
INJECTION, SOLUTION INFILTRATION; PERINEURAL AS NEEDED
Status: DISCONTINUED | OUTPATIENT
Start: 2022-12-30 | End: 2022-12-30 | Stop reason: HOSPADM

## 2022-12-30 RX ORDER — PROMETHAZINE HYDROCHLORIDE 25 MG/1
12.5 TABLET ORAL ONCE AS NEEDED
Status: DISCONTINUED | OUTPATIENT
Start: 2022-12-30 | End: 2022-12-30 | Stop reason: HOSPADM

## 2022-12-30 RX ADMIN — Medication 100 MCG: at 14:49

## 2022-12-30 RX ADMIN — Medication 100 MCG: at 14:39

## 2022-12-30 RX ADMIN — Medication 50 MCG: at 14:26

## 2022-12-30 RX ADMIN — Medication 2 G: at 14:28

## 2022-12-30 RX ADMIN — Medication 100 MCG: at 15:03

## 2022-12-30 RX ADMIN — FENTANYL CITRATE 100 MCG: 50 INJECTION INTRAMUSCULAR; INTRAVENOUS at 14:30

## 2022-12-30 RX ADMIN — Medication 50 MCG: at 14:42

## 2022-12-30 RX ADMIN — Medication 50 MCG: at 14:58

## 2022-12-30 RX ADMIN — SODIUM CHLORIDE, POTASSIUM CHLORIDE, SODIUM LACTATE AND CALCIUM CHLORIDE 1000 ML: 600; 310; 30; 20 INJECTION, SOLUTION INTRAVENOUS at 11:13

## 2022-12-30 RX ADMIN — PROPOFOL 80 MCG/KG/MIN: 10 INJECTION, EMULSION INTRAVENOUS at 14:30

## 2022-12-30 NOTE — OP NOTE
INSERTION VENOUS ACCESS DEVICE  Procedure Note    Tiff Elizalde  12/30/2022    Pre-op Diagnosis:   Malignant neoplasm of ovary, unspecified laterality (HCC) [C56.9]    Post-op Diagnosis:     same    Procedure/CPT® Codes:      Procedure(s):  INSERTION VENOUS ACCESS DEVICE    Surgeon(s):  Holley Mcgill MD    Anesthesia: Monitored Anesthesia Care    Staff:   Circulator: Ashley Arias RN  Scrub Person: Donaldo KEITA RN; Christel Bishop  Assistant: Bisi Jackson    Estimated Blood Loss: minimal    Specimens: none                     Access site: Left subclavian vein        Complications: none          Date: 12/30/2022  Time: 15:06 CST  The patient was brought to the operating room and placed in the supine position. After IV sedation and infusion of IV antibiotics, the patient was prepped and draped in the usual sterile fashion. Lidocaine 1% was used for local anesthesia. The vein was accessed, the wire passed. Fluoroscopy revealed it to be in good position. The pocket was incised, developed. The catheter was tunneled, cut to size, secured to the port, and the port sutured in place with 0 Prolene. Sheath passed, catheter was fed. Fluoroscopy revealed it to be in good position. Good flush and flow obtained. The wound was closed with 3-0 and 4-0 Vicryl sutures. Dressing was placed. The patient was awakened and transferred to the recovery room in stable condition, tolerated the procedure well. At the end of the procedure, all counts were correct.       Holley Mcgill MD

## 2022-12-30 NOTE — ANESTHESIA POSTPROCEDURE EVALUATION
Patient: Tiff Elizalde    Procedure Summary     Date: 12/30/22 Room / Location:  [facility]  /  [facility]    Anesthesia Start: 1423 Anesthesia Stop: 1515    Procedure: INSERTION VENOUS ACCESS DEVICE (Chin to Nipples) Diagnosis:       Malignant neoplasm of ovary, unspecified laterality (HCC)      (Malignant neoplasm of ovary, unspecified laterality (HCC) [C56.9])    Surgeons: Holley Mcgill MD Provider: Win Smith CRNA    Anesthesia Type: MAC ASA Status: 2          Anesthesia Type: MAC    Vitals  Vitals Value Taken Time   BP 96/57 12/30/22 1512   Temp     Pulse 71 12/30/22 1515   Resp     SpO2 97 % 12/30/22 1515   Vitals shown include unvalidated device data.        Post Anesthesia Care and Evaluation    Patient location during evaluation: PHASE II  Patient participation: complete - patient participated  Level of consciousness: awake and alert  Pain score: 0  Pain management: adequate    Airway patency: patent  Anesthetic complications: No anesthetic complications  PONV Status: none  Cardiovascular status: acceptable and blood pressure returned to baseline  Respiratory status: acceptable  Hydration status: acceptable  No anesthesia care post op

## 2022-12-30 NOTE — ANESTHESIA PREPROCEDURE EVALUATION
Anesthesia Evaluation     Patient summary reviewed   no history of anesthetic complications:  NPO Solid Status: > 8 hours  NPO Liquid Status: > 6 hours           Airway   Mallampati: I  TM distance: >3 FB  Neck ROM: full  No difficulty expected  Dental - normal exam     Pulmonary - negative pulmonary ROS and normal exam   Cardiovascular - negative cardio ROS and normal exam  Exercise tolerance: excellent (>7 METS)        Neuro/Psych- negative ROS  GI/Hepatic/Renal/Endo - negative ROS   (-) no thyroid disorder    Musculoskeletal (-) negative ROS    Abdominal  - normal exam   Substance History - negative use     OB/GYN          Other      history of cancer active                    Anesthesia Plan    ASA 2     MAC     intravenous induction     Anesthetic plan, risks, benefits, and alternatives have been provided, discussed and informed consent has been obtained with: patient.        CODE STATUS:

## 2023-01-03 ENCOUNTER — TELEPHONE (OUTPATIENT)
Dept: ONCOLOGY | Facility: CLINIC | Age: 58
End: 2023-01-03
Payer: COMMERCIAL

## 2023-01-03 RX ORDER — LIDOCAINE AND PRILOCAINE 25; 25 MG/G; MG/G
CREAM TOPICAL
Qty: 30 G | Refills: 0 | Status: SHIPPED | OUTPATIENT
Start: 2023-01-03

## 2023-01-03 NOTE — TELEPHONE ENCOUNTER
Call from patient, she asks if it is okay to take her normal daily medication on the days of chemo. I let her know that was fine. She also asked if okay to wear makeup and jewelry. I let her know that is fine. She will want to dress comfortably. She asked about the port. I let her know that the port is just underneath the skin. I let her know that we could send in a script for EMLA cream to help numb the port site before it is stuck.

## 2023-01-06 RX ORDER — FAMOTIDINE 10 MG/ML
20 INJECTION, SOLUTION INTRAVENOUS ONCE
Status: CANCELLED | OUTPATIENT
Start: 2023-01-09

## 2023-01-06 RX ORDER — PALONOSETRON 0.05 MG/ML
0.25 INJECTION, SOLUTION INTRAVENOUS ONCE
Status: CANCELLED | OUTPATIENT
Start: 2023-01-09

## 2023-01-06 RX ORDER — SODIUM CHLORIDE 9 MG/ML
250 INJECTION, SOLUTION INTRAVENOUS ONCE
Status: CANCELLED | OUTPATIENT
Start: 2023-01-09

## 2023-01-06 RX ORDER — FAMOTIDINE 10 MG/ML
20 INJECTION, SOLUTION INTRAVENOUS AS NEEDED
Status: CANCELLED | OUTPATIENT
Start: 2023-01-09

## 2023-01-06 RX ORDER — DIPHENHYDRAMINE HYDROCHLORIDE 50 MG/ML
50 INJECTION INTRAMUSCULAR; INTRAVENOUS AS NEEDED
Status: CANCELLED | OUTPATIENT
Start: 2023-01-09

## 2023-01-08 NOTE — PROGRESS NOTES
[facility] HEMATOLOGY & ONCOLOGY [place] [id number]  [facility] [address] [phone]    Patient Name: Tiff Elizalde  Encounter Date: 01/12/2023  YOB: 1965  Patient Number: [id number]      REASON FOR FOLLOW-UP:Tiff Elizalde is a pleasant 57 y.o.  female who is seen on follow-up for left ovarian cancer. She is seen C1D3 of adjuvant paclitaxel and carboplatin.  She is seen with spouse, Dennis. History is obtained from patient.  She is a reliable historian.        DIAGNOSTIC ABNORMALITIES:   Pain lower back and left leg swelling.  She presented to her PCP.   CT scan done at [facility]. Details not available.  Patient seen by Dr. Virgilio Hayward 11/23/2022. Patient found to have a large pelvic mass causing bilateral hydronephrosis with resultant nonfunctioning left kidney, elevated creatinine, left deep venous thrombosis and pulmonary embolus.  Pathology report 11/28/2022.  Fallopian tube and ovary, left salpingo-oophorectomy: Fallopian tube with no evidence of involvement by malignancy.  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  No surface involvement demonstrated.  Uterus, fallopian tube and ovary, hysterectomy with right salpingo-oophorectomy: Cervix and endocervix with no evidence of squamous or glandular dysplasia.  Chronic cervicitis present.  Weakly proliferative endometrium, negative for hyperplasia, carcinoma, and endometritis.  Benign endometrial polyp present.  Adenomyosis.  No leiomyomata.  Right fallopian tube with focal involvement by serous neoplasm with Samona by this most consistent with low-grade serous carcinoma.  Remnant left ovary with adhesions to uterine serosa and focal involvement by low-grade serous carcinoma.  Right ovary with surface involvement by low-grade serous carcinoma.  Omentum biopsy: Involved by low-grade serous carcinoma, invasive).  fever  Sidewall, left pelvic biopsy: Edema with hemorrhage and chronic inflammation, negative for endometriosis and malignancy.  Lymph node left pelvic biopsy: 1 lymph node with a 0.9 mm focus of metastatic serous carcinoma surrounding adipose tissue with hemorrhage and edema.        PREVIOUS INTERVENTIONS:  IVC filter was placed 11/22/2022 at [facility].  She had undergone exploratory laparotomy, total abdominal hysterectomy, bilateral salpingo-oophorectomy, debulking, left pelvic lymph node dissection and omentectomy by Dr. Hayward on 11/23/2022.  Estimated blood loss 400 ml.  Laparotomy showed a massive mass arising from the left ovary, retroperitoneal, infiltrating the retroperitoneal space and adherent to the sigmoid mesentery.  Within the retroperitoneum it was densely adherent to the iliac vessel on the left side and ureter.  Deliberate rupture of the mass was required in order to access the retroperitoneal attachment.  The mass was sent for frozen section and determined to be at least borderline tumor possibly low-grade serous tumor.  There was no other evidence of disease in the pelvis or upper abdomen.  Due to being densely adherent to the retroperitoneum and iliac vessels, it is unclear whether the entire tumor was removed or not and the pelvic sidewall biopsy was taken to determine whether the residual ovary was present and not a fibrotic reaction.  Bilateral ureterolysis was required.   Hypersensitivity reaction to Taxol 01/09/2023.   Adjuvant taxol and carboplatin 01/10/2023 through present.           Oncology/Hematology History   Ovarian cancer (HCC)   12/15/2022 Initial Diagnosis    Ovarian cancer (HCC)     1/9/2023 -  Chemotherapy    OP OVARIAN PACLitaxel / CARBOplatin (Q21D)     1/9/2023 -  Chemotherapy    OP CENTRAL VENOUS ACCESS DEVICE ACCESS, CARE, AND MAINTENANCE ([facility])         PAST MEDICAL HISTORY:  ALLERGIES:  No Known Allergies  CURRENT MEDICATIONS:  Outpatient Encounter Medications as of  1/12/2023   Medication Sig Dispense Refill   • Calcium Carbonate-Vit D-Min (CALCIUM 1200 PO) Take  by mouth.     • dexamethasone (DECADRON) 4 MG tablet Take one tablet by mouth at bedtime and one tablet in the morning before coming for treatment. 2 tablet 0   • Eliquis 5 MG tablet tablet 5 mg Every 12 (Twelve) Hours.     • ferrous sulfate 325 (65 FE) MG tablet Take 1 tablet by mouth Daily With Breakfast. 60 tablet 2   • HYDROcodone-acetaminophen (NORCO) 7.5-325 MG per tablet Take 1 tablet by mouth Every 4 (Four) Hours As Needed for Moderate Pain (Pain). 15 tablet 0   • lidocaine-prilocaine (EMLA) 2.5-2.5 % cream Apply to port site 30 minutes prior to being accessed. 30 g 0   • lisinopril-hydrochlorothiazide (PRINZIDE,ZESTORETIC) 20-12.5 MG per tablet Take 1 tablet by mouth Daily.     • ondansetron (Zofran) 8 MG tablet Take 1 tablet by mouth Every 8 (Eight) Hours As Needed for Nausea or Vomiting. 60 tablet 2   • prochlorperazine (COMPAZINE) 10 MG tablet Take 1 tablet by mouth Every 6 (Six) Hours As Needed for Nausea. 60 tablet 2   • vitamin D3 125 MCG (5000 UT) capsule capsule Take  by mouth Daily.       No facility-administered encounter medications on file as of 1/12/2023.     ADULT ILLNESSES:  Patient Active Problem List   Diagnosis Code   • Ovarian cancer (HCC) C56.9     SURGERIES:  Past Surgical History:   Procedure Laterality Date   • VENA CAVA FILTER INSERTION     • VENOUS ACCESS DEVICE (PORT) INSERTION N/A 12/30/2022    Procedure: INSERTION VENOUS ACCESS DEVICE;  Surgeon: Holley Mcgill MD;  Location: [facility];  Service: General;  Laterality: N/A;     HEALTH MAINTENANCE ITEMS:  Health Maintenance Due   Topic Date Due   • MAMMOGRAM  Never done   • COLORECTAL CANCER SCREENING  Never done   • COVID-19 Vaccine (1) Never done   • TDAP/TD VACCINES (2 - Tdap) 02/24/2008   • ZOSTER VACCINE (1 of 2) Never done   • INFLUENZA VACCINE  Never done   • HEPATITIS C SCREENING  Never done   • ANNUAL PHYSICAL  Never done  "  • PAP SMEAR  Never done       <no information>  Last Completed Colonoscopy     This patient has no relevant Health Maintenance data.          There is no immunization history on file for this patient.  Last Completed Mammogram     This patient has no relevant Health Maintenance data.            FAMILY HISTORY:  No family history on file.  SOCIAL HISTORY:  Social History     Socioeconomic History   • Marital status:    Tobacco Use   • Smoking status: Never   Vaping Use   • Vaping Use: Never used   Substance and Sexual Activity   • Alcohol use: Never   • Drug use: Never   • Sexual activity: Defer       REVIEW OF SYSTEMS:    Review of Systems   Constitutional: Negative for fatigue, fever and unexpected weight change.        \"Monday scared me.\"   HENT: Negative for congestion, mouth sores and trouble swallowing.    Eyes: Negative for discharge and redness.   Respiratory: Negative for cough, shortness of breath and wheezing.    Cardiovascular: Negative for chest pain and leg swelling.   Gastrointestinal: Positive for constipation. Negative for abdominal pain, diarrhea, nausea and vomiting.   Endocrine: Negative for polydipsia and polyphagia.   Genitourinary: Negative for dysuria, flank pain and hematuria.   Musculoskeletal: Negative for gait problem.   Skin: Positive for pallor.   Allergic/Immunologic: Negative for food allergies.   Neurological: Negative for dizziness and weakness.   Hematological: Negative for adenopathy. Does not bruise/bleed easily.   Psychiatric/Behavioral: Negative for agitation, confusion and hallucinations.       VITAL SIGNS: /62   Pulse 110   Temp 98.3 °F (36.8 °C)   Resp 18   Ht 170.2 cm (67\")   Wt 80.6 kg (177 lb 9.6 oz)   SpO2 92%   Breastfeeding No   BMI 27.82 kg/m²  Gained 2 pounds.   Pain Score    01/12/23 1442   PainSc: 2  Comment: pain/stiff in my neck.  Tender feeling       PHYSICAL EXAMINATION:     Physical Exam  Vitals reviewed.   HENT:      Head: Normocephalic " and atraumatic.   Eyes:      General: No scleral icterus.  Cardiovascular:      Rate and Rhythm: Normal rate.   Pulmonary:      Effort: No respiratory distress.      Breath sounds: No wheezing or rales.      Comments: Port, left. No erythema.  Abdominal:      General: Bowel sounds are normal.      Palpations: Abdomen is soft.      Tenderness: There is no abdominal tenderness.   Musculoskeletal:         General: No swelling.      Cervical back: Neck supple.   Skin:     General: Skin is warm.      Coloration: Skin is pale.   Neurological:      Mental Status: She is alert and oriented to person, place, and time.   Psychiatric:         Mood and Affect: Mood normal.         Behavior: Behavior normal.         Thought Content: Thought content normal.         Judgment: Judgment normal.         LABS    Lab Results - Last 18 Months   Lab Units 01/09/23  0845 12/28/22  1337 12/15/22  1419   HEMOGLOBIN g/dL 11.8* 11.3* 11.4*   HEMATOCRIT % 37.6 34.7 36.7   MCV fL 89.7 88.1 90.6   WBC 10*3/mm3 8.55 8.87 9.66   RDW % 13.2 13.3 13.2   MPV fL 9.9 9.6 10.2   PLATELETS 10*3/mm3 282 308 313   IMM GRAN % % 0.4  --  0.4   NEUTROS ABS 10*3/mm3 6.02  --  6.37   LYMPHS ABS 10*3/mm3 1.68  --  2.22   MONOS ABS 10*3/mm3 0.68  --  0.79   EOS ABS 10*3/mm3 0.09  --  0.18   BASOS ABS 10*3/mm3 0.05  --  0.06   IMMATURE GRANS (ABS) 10*3/mm3 0.03  --  0.04   NRBC /100 WBC 0.0  --  0.0       Lab Results - Last 18 Months   Lab Units 01/09/23  0845 12/28/22  1337 12/15/22  1419   GLUCOSE mg/dL 90 97 106*   SODIUM mmol/L 142 144 142   POTASSIUM mmol/L 4.1 3.8 4.2   CO2 mmol/L 29.0 30.0* 28.0   CHLORIDE mmol/L 105 104 104   ANION GAP mmol/L 8.0 10.0 10.0   CREATININE mg/dL 1.37* 1.29* 1.69*   BUN mg/dL 17 17 22*   BUN / CREAT RATIO  12.4 13.2 13.0   CALCIUM mg/dL 9.9 9.8 10.0   ALK PHOS U/L 71 68 79   TOTAL PROTEIN g/dL 7.4 7.4 7.5   ALT (SGPT) U/L 10 14 9   AST (SGOT) U/L 14 18 12   BILIRUBIN mg/dL 0.4 0.2 0.3   ALBUMIN g/dL 4.3 4.2 3.90   GLOBULIN  "gm/dL 3.1 3.2 3.6       No results for input(s): MSPIKE, KAPPALAMB, IGLFLC, URICACID, FREEKAPPAL, CEA, LDH, REFLABREPO in the last 73625 hours.    Lab Results - Last 18 Months   Lab Units 01/09/23  0845   IRON mcg/dL 35*   TIBC mcg/dL 308   IRON SATURATION % 11*   FERRITIN ng/mL 379.80*       Tiff Elizalde reports a pain score of 2. \"My neck feels stiff. I was so nervous.\" Given her pain assessment as noted, treatment options were discussed and the following options were decided upon as a follow-up plan to address the patient's pain: continuation of current treatment plan for pain.      ASSESSMENT:  1.  Ovarian cancer, low-grade serous carcinoma, left.  Tumor size 18 cm  AJCC stage:IIIA1 (pT3, pN1, cM0, G1)  Treatment status:On adjuvant Taxol and carboplatin 01/10/2023 through present.  2.  Performance status of 1.  3.  Left deep venous thrombosis from malignancy. Taking Eliquis.   4.  Pulmonary embolism from malignancy. Taking Eliquis.  Post IVC filter 11/22/2022.  5.  Anemia from iron deficiency. On oral iron 01/10/2023 through present. .   6.  Hypersensitivity reaction to Taxol 01/09/2023.            PLAN:  1.   Re: Note from Dr. Mcgill 12/20/2022.  Port placement on 12/30/2022.   2.   Re: Tolerance to adjuvant paclitaxel and carboplatin.  \"I am fine.\"  3.   Re: Heme status.  WBC 8.5, hemoglobin 11.8, hematocrit 37.6, MCV 89.7 and platelet 282.   4.   Re: CMP.  GFR 44.6 from 47 mL per minute   5.   Re:  Magnesium 2.  6.   Re:  Retic 1.4, ferritin 379.8, and iron saturation 11%.    7.  Continue adjuvant chemo.  Monitor for anaphylaxis, recurrent infusion reactions, nausea, vomiting, neuropathy, alopecia and cytopenias.  8.  Schedule chemo C2D1 on 01/31/2023 and every 21 days after port.  Plan for 6 cycles.    Carboplatin AUC = 6.  Taxol 175 mg/m2 over 3 hours.   9.  Premedicate with:  Aloxi 0.25 mg IV  Emend 150 mg IV  Decadron 12 mg IV  Pepcid 20 mg IV  Benadryl 50 mg " "IV  10. Neulasta Onpro 6 mg D1 of chemo as primary prophylaxis. High risk for neutropenic fever.    11.  Weekly CBC with differential, CMP, and magnesium.  12.  eRx for Zofran 8 mg p.o. every 8 hours as needed for nausea and vomiting #60, 2 refills if needed.  13.  eRx for Compazine 10 mg p.o. every 4 hours as needed for nausea and vomiting #60, 2 refills if needed.  14.  eRx Decadron 4 mg po start night before chemo and on the day of chemo, 10.  15.  Continue ongoing management per primary care physician and other specialists.  16.  Plan of care discussed with patient and spouse.  Understanding expressed.  Patient agreeable to proceed.  17.  Questions were answered to her satisfaction. \"Yes.\"  18.  Return to office in 3 weeks.  19.  Obtain CT scans from [facility].       I have reviewed the assessment and plan and verified the accuracy of it. No changes to assessment and plan since the information was documented. Walker Magana MD 01/12/23        I spent 36 total minutes, face-to-face, caring for Tiff today.  Greater than 50% of this time involved counseling and/or coordination of care as documented within this note regarding the patient's illness(es), pros and cons of various treatment options, instructions and/or risk reduction.             MD Virgilio Reyes MD Jonathan Maddux, MD    "

## 2023-01-09 ENCOUNTER — DOCUMENTATION (OUTPATIENT)
Dept: RADIATION ONCOLOGY | Facility: HOSPITAL | Age: 58
End: 2023-01-09
Payer: COMMERCIAL

## 2023-01-09 ENCOUNTER — LAB (OUTPATIENT)
Dept: LAB | Facility: HOSPITAL | Age: 58
End: 2023-01-09
Payer: COMMERCIAL

## 2023-01-09 ENCOUNTER — INFUSION (OUTPATIENT)
Dept: ONCOLOGY | Facility: HOSPITAL | Age: 58
End: 2023-01-09
Payer: COMMERCIAL

## 2023-01-09 VITALS
OXYGEN SATURATION: 98 % | HEART RATE: 94 BPM | HEIGHT: 67 IN | RESPIRATION RATE: 17 BRPM | SYSTOLIC BLOOD PRESSURE: 104 MMHG | WEIGHT: 172 LBS | DIASTOLIC BLOOD PRESSURE: 62 MMHG | BODY MASS INDEX: 27 KG/M2 | TEMPERATURE: 97.9 F

## 2023-01-09 DIAGNOSIS — C56.2 MALIGNANT NEOPLASM OF LEFT OVARY: ICD-10-CM

## 2023-01-09 DIAGNOSIS — D50.0 IRON DEFICIENCY ANEMIA DUE TO CHRONIC BLOOD LOSS: ICD-10-CM

## 2023-01-09 DIAGNOSIS — C56.2 MALIGNANT NEOPLASM OF LEFT OVARY: Primary | ICD-10-CM

## 2023-01-09 LAB
ALBUMIN SERPL-MCNC: 4.3 G/DL (ref 3.5–5.2)
ALBUMIN/GLOB SERPL: 1.4 G/DL
ALP SERPL-CCNC: 71 U/L (ref 39–117)
ALT SERPL W P-5'-P-CCNC: 10 U/L (ref 1–33)
ANION GAP SERPL CALCULATED.3IONS-SCNC: 8 MMOL/L (ref 5–15)
AST SERPL-CCNC: 14 U/L (ref 1–32)
BASOPHILS # BLD AUTO: 0.05 10*3/MM3 (ref 0–0.2)
BASOPHILS NFR BLD AUTO: 0.6 % (ref 0–1.5)
BILIRUB SERPL-MCNC: 0.4 MG/DL (ref 0–1.2)
BUN SERPL-MCNC: 17 MG/DL (ref 6–20)
BUN/CREAT SERPL: 12.4 (ref 7–25)
CALCIUM SPEC-SCNC: 9.9 MG/DL (ref 8.6–10.5)
CHLORIDE SERPL-SCNC: 105 MMOL/L (ref 98–107)
CO2 SERPL-SCNC: 29 MMOL/L (ref 22–29)
CREAT SERPL-MCNC: 1.37 MG/DL (ref 0.57–1)
DEPRECATED RDW RBC AUTO: 43.7 FL (ref 37–54)
EGFRCR SERPLBLD CKD-EPI 2021: 44.6 ML/MIN/1.73
EOSINOPHIL # BLD AUTO: 0.09 10*3/MM3 (ref 0–0.4)
EOSINOPHIL NFR BLD AUTO: 1.1 % (ref 0.3–6.2)
ERYTHROCYTE [DISTWIDTH] IN BLOOD BY AUTOMATED COUNT: 13.2 % (ref 12.3–15.4)
FERRITIN SERPL-MCNC: 379.8 NG/ML (ref 13–150)
GLOBULIN UR ELPH-MCNC: 3.1 GM/DL
GLUCOSE SERPL-MCNC: 90 MG/DL (ref 65–99)
HCT VFR BLD AUTO: 37.6 % (ref 34–46.6)
HGB BLD-MCNC: 11.8 G/DL (ref 12–15.9)
IMM GRANULOCYTES # BLD AUTO: 0.03 10*3/MM3 (ref 0–0.05)
IMM GRANULOCYTES NFR BLD AUTO: 0.4 % (ref 0–0.5)
IRON 24H UR-MRATE: 35 MCG/DL (ref 37–145)
IRON SATN MFR SERPL: 11 % (ref 20–50)
LYMPHOCYTES # BLD AUTO: 1.68 10*3/MM3 (ref 0.7–3.1)
LYMPHOCYTES NFR BLD AUTO: 19.6 % (ref 19.6–45.3)
MAGNESIUM SERPL-MCNC: 2 MG/DL (ref 1.6–2.6)
MCH RBC QN AUTO: 28.2 PG (ref 26.6–33)
MCHC RBC AUTO-ENTMCNC: 31.4 G/DL (ref 31.5–35.7)
MCV RBC AUTO: 89.7 FL (ref 79–97)
MONOCYTES # BLD AUTO: 0.68 10*3/MM3 (ref 0.1–0.9)
MONOCYTES NFR BLD AUTO: 8 % (ref 5–12)
NEUTROPHILS NFR BLD AUTO: 6.02 10*3/MM3 (ref 1.7–7)
NEUTROPHILS NFR BLD AUTO: 70.3 % (ref 42.7–76)
NRBC BLD AUTO-RTO: 0 /100 WBC (ref 0–0.2)
PLATELET # BLD AUTO: 282 10*3/MM3 (ref 140–450)
PMV BLD AUTO: 9.9 FL (ref 6–12)
POTASSIUM SERPL-SCNC: 4.1 MMOL/L (ref 3.5–5.2)
PROT SERPL-MCNC: 7.4 G/DL (ref 6–8.5)
RBC # BLD AUTO: 4.19 10*6/MM3 (ref 3.77–5.28)
RETICS # AUTO: 0.06 10*6/MM3 (ref 0.02–0.13)
RETICS/RBC NFR AUTO: 1.4 % (ref 0.7–1.9)
SODIUM SERPL-SCNC: 142 MMOL/L (ref 136–145)
TIBC SERPL-MCNC: 308 MCG/DL (ref 298–536)
TRANSFERRIN SERPL-MCNC: 207 MG/DL (ref 200–360)
WBC NRBC COR # BLD: 8.55 10*3/MM3 (ref 3.4–10.8)

## 2023-01-09 PROCEDURE — 96409 CHEMO IV PUSH SNGL DRUG: CPT

## 2023-01-09 PROCEDURE — 25010000002 PALONOSETRON PER 25 MCG: Performed by: INTERNAL MEDICINE

## 2023-01-09 PROCEDURE — 25010000002 FOSAPREPITANT PER 1 MG: Performed by: INTERNAL MEDICINE

## 2023-01-09 PROCEDURE — 83735 ASSAY OF MAGNESIUM: CPT

## 2023-01-09 PROCEDURE — 25010000002 HYDROCORTISONE SOD SUC (PF) 100 MG RECONSTITUTED SOLUTION: Performed by: INTERNAL MEDICINE

## 2023-01-09 PROCEDURE — 85045 AUTOMATED RETICULOCYTE COUNT: CPT

## 2023-01-09 PROCEDURE — 96376 TX/PRO/DX INJ SAME DRUG ADON: CPT

## 2023-01-09 PROCEDURE — 25010000002 HEPARIN LOCK FLUSH PER 10 UNITS: Performed by: INTERNAL MEDICINE

## 2023-01-09 PROCEDURE — 25010000002 DEXAMETHASONE SODIUM PHOSPHATE 100 MG/10ML SOLUTION: Performed by: INTERNAL MEDICINE

## 2023-01-09 PROCEDURE — 25010000002 DIPHENHYDRAMINE PER 50 MG: Performed by: INTERNAL MEDICINE

## 2023-01-09 PROCEDURE — 36415 COLL VENOUS BLD VENIPUNCTURE: CPT

## 2023-01-09 PROCEDURE — 96367 TX/PROPH/DG ADDL SEQ IV INF: CPT

## 2023-01-09 PROCEDURE — 80053 COMPREHEN METABOLIC PANEL: CPT

## 2023-01-09 PROCEDURE — 25010000002 PACLITAXEL PER 1 MG: Performed by: INTERNAL MEDICINE

## 2023-01-09 PROCEDURE — 85025 COMPLETE CBC W/AUTO DIFF WBC: CPT

## 2023-01-09 PROCEDURE — 84466 ASSAY OF TRANSFERRIN: CPT

## 2023-01-09 PROCEDURE — 83540 ASSAY OF IRON: CPT

## 2023-01-09 PROCEDURE — 82728 ASSAY OF FERRITIN: CPT

## 2023-01-09 PROCEDURE — 96375 TX/PRO/DX INJ NEW DRUG ADDON: CPT

## 2023-01-09 RX ORDER — DIPHENHYDRAMINE HYDROCHLORIDE 50 MG/ML
50 INJECTION INTRAMUSCULAR; INTRAVENOUS AS NEEDED
Status: COMPLETED | OUTPATIENT
Start: 2023-01-09 | End: 2023-01-09

## 2023-01-09 RX ORDER — HEPARIN SODIUM (PORCINE) LOCK FLUSH IV SOLN 100 UNIT/ML 100 UNIT/ML
500 SOLUTION INTRAVENOUS AS NEEDED
Status: DISCONTINUED | OUTPATIENT
Start: 2023-01-09 | End: 2023-01-09 | Stop reason: HOSPADM

## 2023-01-09 RX ORDER — FERROUS SULFATE 325(65) MG
325 TABLET ORAL
Qty: 60 TABLET | Refills: 2 | Status: SHIPPED | OUTPATIENT
Start: 2023-01-09

## 2023-01-09 RX ORDER — PALONOSETRON 0.05 MG/ML
0.25 INJECTION, SOLUTION INTRAVENOUS ONCE
Status: COMPLETED | OUTPATIENT
Start: 2023-01-09 | End: 2023-01-09

## 2023-01-09 RX ORDER — FAMOTIDINE 10 MG/ML
20 INJECTION, SOLUTION INTRAVENOUS ONCE
Status: COMPLETED | OUTPATIENT
Start: 2023-01-09 | End: 2023-01-09

## 2023-01-09 RX ORDER — SODIUM CHLORIDE 0.9 % (FLUSH) 0.9 %
10 SYRINGE (ML) INJECTION AS NEEDED
Status: DISCONTINUED | OUTPATIENT
Start: 2023-01-09 | End: 2023-01-09 | Stop reason: HOSPADM

## 2023-01-09 RX ORDER — FAMOTIDINE 10 MG/ML
20 INJECTION, SOLUTION INTRAVENOUS AS NEEDED
Status: COMPLETED | OUTPATIENT
Start: 2023-01-09 | End: 2023-01-09

## 2023-01-09 RX ORDER — SODIUM CHLORIDE 9 MG/ML
250 INJECTION, SOLUTION INTRAVENOUS ONCE
Status: COMPLETED | OUTPATIENT
Start: 2023-01-09 | End: 2023-01-09

## 2023-01-09 RX ORDER — SODIUM CHLORIDE 0.9 % (FLUSH) 0.9 %
10 SYRINGE (ML) INJECTION AS NEEDED
Status: CANCELLED | OUTPATIENT
Start: 2023-01-09

## 2023-01-09 RX ORDER — HEPARIN SODIUM (PORCINE) LOCK FLUSH IV SOLN 100 UNIT/ML 100 UNIT/ML
500 SOLUTION INTRAVENOUS AS NEEDED
Status: CANCELLED | OUTPATIENT
Start: 2023-01-09

## 2023-01-09 RX ORDER — DEXAMETHASONE 4 MG/1
TABLET ORAL
Qty: 2 TABLET | Refills: 0 | Status: SHIPPED | OUTPATIENT
Start: 2023-01-09 | End: 2023-01-12

## 2023-01-09 RX ADMIN — Medication 10 ML: at 13:02

## 2023-01-09 RX ADMIN — DIPHENHYDRAMINE HYDROCHLORIDE 25 MG: 50 INJECTION, SOLUTION INTRAMUSCULAR; INTRAVENOUS at 11:57

## 2023-01-09 RX ADMIN — PACLITAXEL 335 MG: 6 INJECTION, SOLUTION, CONCENTRATE INTRAVENOUS at 11:39

## 2023-01-09 RX ADMIN — PALONOSETRON HYDROCHLORIDE 0.25 MG: 0.25 INJECTION, SOLUTION INTRAVENOUS at 10:27

## 2023-01-09 RX ADMIN — FAMOTIDINE 20 MG: 10 INJECTION INTRAVENOUS at 11:55

## 2023-01-09 RX ADMIN — HYDROCORTISONE SODIUM SUCCINATE 100 MG: 100 INJECTION, POWDER, FOR SOLUTION INTRAMUSCULAR; INTRAVENOUS at 11:54

## 2023-01-09 RX ADMIN — DIPHENHYDRAMINE HYDROCHLORIDE 50 MG: 50 INJECTION, SOLUTION INTRAMUSCULAR; INTRAVENOUS at 10:28

## 2023-01-09 RX ADMIN — FOSAPREPITANT 150 MG: 150 INJECTION, POWDER, LYOPHILIZED, FOR SOLUTION INTRAVENOUS at 11:09

## 2023-01-09 RX ADMIN — HEPARIN SODIUM (PORCINE) LOCK FLUSH IV SOLN 100 UNIT/ML 500 UNITS: 100 SOLUTION at 13:02

## 2023-01-09 RX ADMIN — FAMOTIDINE 20 MG: 10 INJECTION INTRAVENOUS at 10:25

## 2023-01-09 RX ADMIN — SODIUM CHLORIDE 250 ML: 9 INJECTION, SOLUTION INTRAVENOUS at 10:06

## 2023-01-09 RX ADMIN — DEXAMETHASONE SODIUM PHOSPHATE 12 MG: 10 INJECTION, SOLUTION INTRAMUSCULAR; INTRAVENOUS at 10:48

## 2023-01-09 NOTE — TELEPHONE ENCOUNTER
Call from Laurie Elliott RN that patient had reaction to Taxol.  Flushing, hot flashes, and abdomina discomfort.  Rescue meds given. Patient feeling better.  Dr. Magana notified order given to hold treatment today and rechallenge tomorrow.  Order for Decadron 4 mg tonight and 4 mg in the morning before coming for treatment.

## 2023-01-09 NOTE — PROGRESS NOTES
Patient's first time receiving chemo, approximately 12 minutes into Taxol patient turned on call light and stated she felt funny. Taxol stopped. Patient started to feel hot and flushed inspected patient's abdomen and back. Patient's neck red and patient grabbing stomach saying it was aching and rated her discomfort a 10. Rescue drugs given, vitals stable, Oxygen at 2 L per nasal cannula applied to patient. Diana rn at Dr Magana's office notified. Per Diana hold treatment today, they will call in oral decadron and rechallenge tomorrow. Patient states she feels better and rates pain a 3. Redness gone from neck and patient no longer feels hot. Abdominal aching minimal per patient. Patient resting and stable at this time. Patient letting her  know.

## 2023-01-09 NOTE — PROGRESS NOTES
CAROLYN met with Mrs. Elizalde who is here to start treatment for malignant neoplasm of left ovary. SW introduced self and explained role and source of support. She is 57 years old lives with her spouse. She has a strong support system which includes her friends and family. She is a member at [facility]. She does not have any transportation or financial concerns. Mrs. Elizalde does not take any medication for anxiety or depression and she does not see a counselor. Mrs. Elizalde states she is feeling a little bit nervous about her how body will react to the treatment. Reassurance and emotional support provided. SW did inform Mrs. Elizalde about the resource room. SW encouraged her to call as needed.

## 2023-01-10 ENCOUNTER — INFUSION (OUTPATIENT)
Dept: ONCOLOGY | Facility: HOSPITAL | Age: 58
End: 2023-01-10
Payer: COMMERCIAL

## 2023-01-10 VITALS
RESPIRATION RATE: 20 BRPM | SYSTOLIC BLOOD PRESSURE: 143 MMHG | DIASTOLIC BLOOD PRESSURE: 69 MMHG | WEIGHT: 175 LBS | OXYGEN SATURATION: 100 % | HEART RATE: 93 BPM | TEMPERATURE: 98.5 F | HEIGHT: 67 IN | BODY MASS INDEX: 27.47 KG/M2

## 2023-01-10 DIAGNOSIS — C56.2 MALIGNANT NEOPLASM OF LEFT OVARY: Primary | ICD-10-CM

## 2023-01-10 PROCEDURE — 96375 TX/PRO/DX INJ NEW DRUG ADDON: CPT

## 2023-01-10 PROCEDURE — 96415 CHEMO IV INFUSION ADDL HR: CPT

## 2023-01-10 PROCEDURE — 25010000002 DIPHENHYDRAMINE PER 50 MG: Performed by: INTERNAL MEDICINE

## 2023-01-10 PROCEDURE — 96377 APPLICATON ON-BODY INJECTOR: CPT

## 2023-01-10 PROCEDURE — 25010000002 CARBOPLATIN PER 50 MG: Performed by: INTERNAL MEDICINE

## 2023-01-10 PROCEDURE — 25010000002 HEPARIN LOCK FLUSH PER 10 UNITS: Performed by: INTERNAL MEDICINE

## 2023-01-10 PROCEDURE — 96417 CHEMO IV INFUS EACH ADDL SEQ: CPT

## 2023-01-10 PROCEDURE — 25010000002 PEGFILGRASTIM 6 MG/0.6ML PREFILLED SYRINGE KIT: Performed by: INTERNAL MEDICINE

## 2023-01-10 PROCEDURE — 25010000002 PACLITAXEL PER 1 MG: Performed by: INTERNAL MEDICINE

## 2023-01-10 PROCEDURE — 25010000002 DEXAMETHASONE SODIUM PHOSPHATE 100 MG/10ML SOLUTION: Performed by: INTERNAL MEDICINE

## 2023-01-10 PROCEDURE — 25010000002 ONDANSETRON PER 1 MG: Performed by: INTERNAL MEDICINE

## 2023-01-10 PROCEDURE — 96413 CHEMO IV INFUSION 1 HR: CPT

## 2023-01-10 PROCEDURE — 96367 TX/PROPH/DG ADDL SEQ IV INF: CPT

## 2023-01-10 RX ORDER — SODIUM CHLORIDE 0.9 % (FLUSH) 0.9 %
10 SYRINGE (ML) INJECTION AS NEEDED
Status: CANCELLED | OUTPATIENT
Start: 2023-01-10

## 2023-01-10 RX ORDER — SODIUM CHLORIDE 9 MG/ML
250 INJECTION, SOLUTION INTRAVENOUS ONCE
Status: COMPLETED | OUTPATIENT
Start: 2023-01-10 | End: 2023-01-10

## 2023-01-10 RX ORDER — FAMOTIDINE 10 MG/ML
20 INJECTION, SOLUTION INTRAVENOUS ONCE
Status: COMPLETED | OUTPATIENT
Start: 2023-01-10 | End: 2023-01-10

## 2023-01-10 RX ORDER — SODIUM CHLORIDE 0.9 % (FLUSH) 0.9 %
10 SYRINGE (ML) INJECTION AS NEEDED
Status: DISCONTINUED | OUTPATIENT
Start: 2023-01-10 | End: 2023-01-10 | Stop reason: HOSPADM

## 2023-01-10 RX ORDER — FAMOTIDINE 10 MG/ML
20 INJECTION, SOLUTION INTRAVENOUS AS NEEDED
Status: DISCONTINUED | OUTPATIENT
Start: 2023-01-10 | End: 2023-01-10 | Stop reason: HOSPADM

## 2023-01-10 RX ORDER — HEPARIN SODIUM (PORCINE) LOCK FLUSH IV SOLN 100 UNIT/ML 100 UNIT/ML
500 SOLUTION INTRAVENOUS AS NEEDED
Status: CANCELLED | OUTPATIENT
Start: 2023-01-10

## 2023-01-10 RX ORDER — PALONOSETRON 0.05 MG/ML
0.25 INJECTION, SOLUTION INTRAVENOUS ONCE
Status: DISCONTINUED | OUTPATIENT
Start: 2023-01-10 | End: 2023-01-10

## 2023-01-10 RX ORDER — DIPHENHYDRAMINE HYDROCHLORIDE 50 MG/ML
50 INJECTION INTRAMUSCULAR; INTRAVENOUS AS NEEDED
Status: DISCONTINUED | OUTPATIENT
Start: 2023-01-10 | End: 2023-01-10 | Stop reason: HOSPADM

## 2023-01-10 RX ORDER — HEPARIN SODIUM (PORCINE) LOCK FLUSH IV SOLN 100 UNIT/ML 100 UNIT/ML
500 SOLUTION INTRAVENOUS AS NEEDED
Status: DISCONTINUED | OUTPATIENT
Start: 2023-01-10 | End: 2023-01-10 | Stop reason: HOSPADM

## 2023-01-10 RX ADMIN — PEGFILGRASTIM 6 MG: KIT SUBCUTANEOUS at 15:12

## 2023-01-10 RX ADMIN — DIPHENHYDRAMINE HYDROCHLORIDE 50 MG: 50 INJECTION, SOLUTION INTRAMUSCULAR; INTRAVENOUS at 09:25

## 2023-01-10 RX ADMIN — PACLITAXEL 335 MG: 6 INJECTION, SOLUTION, CONCENTRATE INTRAVENOUS at 10:59

## 2023-01-10 RX ADMIN — FAMOTIDINE 20 MG: 10 INJECTION INTRAVENOUS at 09:26

## 2023-01-10 RX ADMIN — Medication 10 ML: at 15:18

## 2023-01-10 RX ADMIN — ONDANSETRON 16 MG: 2 INJECTION INTRAMUSCULAR; INTRAVENOUS at 10:39

## 2023-01-10 RX ADMIN — SODIUM CHLORIDE 250 ML: 9 INJECTION, SOLUTION INTRAVENOUS at 09:25

## 2023-01-10 RX ADMIN — HEPARIN SODIUM (PORCINE) LOCK FLUSH IV SOLN 100 UNIT/ML 500 UNITS: 100 SOLUTION at 15:18

## 2023-01-10 RX ADMIN — CARBOPLATIN 482 MG: 10 INJECTION, SOLUTION INTRAVENOUS at 14:14

## 2023-01-10 RX ADMIN — DEXAMETHASONE SODIUM PHOSPHATE 12 MG: 10 INJECTION, SOLUTION INTRAMUSCULAR; INTRAVENOUS at 09:50

## 2023-01-10 NOTE — PROGRESS NOTES
1255 Patient no longer has abdominal pain. All previous symptoms have resolved. Home with , to return in am for re challenge per Dr. Magana.

## 2023-01-11 ENCOUNTER — TELEPHONE (OUTPATIENT)
Dept: ONCOLOGY | Facility: CLINIC | Age: 58
End: 2023-01-11
Payer: COMMERCIAL

## 2023-01-11 NOTE — TELEPHONE ENCOUNTER
Follow up call placed to check on patient Tiff Elizalde after completion of her chemotherapy yesterday 1/10/23. ( patient had reaction to her Taxol drug on Monday 1/9/23, chemo was d/c for that day and patient was sent prescription to pharmacy to take premedications to help curtail possible reaction with re-challenge [facility]cos for Tuesday 1/10/23). Patient says I have to say, I was pretty shook up and scared when experiencing the reaction, but yesterday 1/10/23 went great with no problems to report).  Patient states that she is actually feeling well and even worked half of day today. This afternoon she reports no problems and is resting at home. Patient was encouraged to call if she has any concerns or questions.

## 2023-01-12 ENCOUNTER — OFFICE VISIT (OUTPATIENT)
Dept: ONCOLOGY | Facility: CLINIC | Age: 58
End: 2023-01-12
Payer: COMMERCIAL

## 2023-01-12 VITALS
HEIGHT: 67 IN | OXYGEN SATURATION: 92 % | BODY MASS INDEX: 27.88 KG/M2 | HEART RATE: 110 BPM | SYSTOLIC BLOOD PRESSURE: 134 MMHG | RESPIRATION RATE: 18 BRPM | WEIGHT: 177.6 LBS | TEMPERATURE: 98.3 F | DIASTOLIC BLOOD PRESSURE: 62 MMHG

## 2023-01-12 DIAGNOSIS — C56.2 MALIGNANT NEOPLASM OF LEFT OVARY: Primary | ICD-10-CM

## 2023-01-12 PROCEDURE — 99214 OFFICE O/P EST MOD 30 MIN: CPT | Performed by: INTERNAL MEDICINE

## 2023-01-12 RX ORDER — DEXAMETHASONE 4 MG/1
TABLET ORAL
Qty: 10 TABLET | Refills: 1 | Status: SHIPPED | OUTPATIENT
Start: 2023-01-12 | End: 2023-02-22 | Stop reason: SDUPTHER

## 2023-01-18 ENCOUNTER — TELEPHONE (OUTPATIENT)
Dept: ONCOLOGY | Facility: CLINIC | Age: 58
End: 2023-01-18
Payer: COMMERCIAL

## 2023-01-18 NOTE — TELEPHONE ENCOUNTER
Called Tiff to make sure she had picked up the premeds for her treatment.  Tiff stated that she had picked the medicine up and wanted to know if she is supposed to do weekly lab work.  Discussed with her that she is supposed to do weekly lab work(CBC, CMP, and Magnesium).  Tiff stated that she wants to do that at [facility].  Lab orders faxed.

## 2023-01-20 ENCOUNTER — TELEPHONE (OUTPATIENT)
Dept: ONCOLOGY | Facility: CLINIC | Age: 58
End: 2023-01-20
Payer: COMMERCIAL

## 2023-01-20 NOTE — TELEPHONE ENCOUNTER
Patient notified to increase oral hydration.  She stated that she has been trying.  She is trying to drink 64 oz a day, but with work it is hard.

## 2023-01-20 NOTE — TELEPHONE ENCOUNTER
----- Message from Walker Magana MD sent at 1/19/2023  2:11 PM CST -----  GFR 36.  Increase oral hydration.      WBC 22.8, lymphocyte 3.6, hemoglobin 11.6, hematocrit 35.1, MCV 86 and platelet 241.

## 2023-01-27 RX ORDER — FAMOTIDINE 10 MG/ML
20 INJECTION, SOLUTION INTRAVENOUS ONCE
Status: CANCELLED | OUTPATIENT
Start: 2023-01-31

## 2023-01-27 RX ORDER — SODIUM CHLORIDE 9 MG/ML
250 INJECTION, SOLUTION INTRAVENOUS ONCE
Status: CANCELLED | OUTPATIENT
Start: 2023-01-31

## 2023-01-27 RX ORDER — FAMOTIDINE 10 MG/ML
20 INJECTION, SOLUTION INTRAVENOUS AS NEEDED
Status: CANCELLED | OUTPATIENT
Start: 2023-01-31

## 2023-01-27 RX ORDER — PALONOSETRON 0.05 MG/ML
0.25 INJECTION, SOLUTION INTRAVENOUS ONCE
Status: CANCELLED | OUTPATIENT
Start: 2023-01-31

## 2023-01-27 RX ORDER — DIPHENHYDRAMINE HYDROCHLORIDE 50 MG/ML
50 INJECTION INTRAMUSCULAR; INTRAVENOUS AS NEEDED
Status: CANCELLED | OUTPATIENT
Start: 2023-01-31

## 2023-01-30 ENCOUNTER — TELEPHONE (OUTPATIENT)
Dept: ONCOLOGY | Facility: CLINIC | Age: 58
End: 2023-01-30
Payer: COMMERCIAL

## 2023-01-30 ENCOUNTER — APPOINTMENT (OUTPATIENT)
Dept: ONCOLOGY | Facility: HOSPITAL | Age: 58
End: 2023-01-30
Payer: COMMERCIAL

## 2023-01-30 ENCOUNTER — APPOINTMENT (OUTPATIENT)
Dept: LAB | Facility: HOSPITAL | Age: 58
End: 2023-01-30
Payer: COMMERCIAL

## 2023-01-30 NOTE — TELEPHONE ENCOUNTER
Patient called inquiring about our inclement weather policy. Patient stated she was willing to come to [place] and get a hotel room for the night to be closer to the hospital for her treatment. Patient lives 45 miles away.  Spoke with Abigail and the schedulers downstairs about their policies. They take a list home when there are chances of inclement weather and a chance we could close so they can call everyone to let them know. I told her that if we didn't close and she was a little late because of the road conditions that would be okay. But if she misses the treatment we can make that treatment up. Patient verbalized understanding.

## 2023-01-31 ENCOUNTER — APPOINTMENT (OUTPATIENT)
Dept: ONCOLOGY | Facility: HOSPITAL | Age: 58
End: 2023-01-31
Payer: COMMERCIAL

## 2023-01-31 ENCOUNTER — APPOINTMENT (OUTPATIENT)
Dept: LAB | Facility: HOSPITAL | Age: 58
End: 2023-01-31
Payer: COMMERCIAL

## 2023-02-03 ENCOUNTER — INFUSION (OUTPATIENT)
Dept: ONCOLOGY | Facility: HOSPITAL | Age: 58
End: 2023-02-03
Payer: COMMERCIAL

## 2023-02-03 ENCOUNTER — LAB (OUTPATIENT)
Dept: LAB | Facility: HOSPITAL | Age: 58
End: 2023-02-03
Payer: COMMERCIAL

## 2023-02-03 VITALS
HEIGHT: 67 IN | BODY MASS INDEX: 27.03 KG/M2 | HEART RATE: 92 BPM | DIASTOLIC BLOOD PRESSURE: 67 MMHG | OXYGEN SATURATION: 100 % | SYSTOLIC BLOOD PRESSURE: 136 MMHG | WEIGHT: 172.2 LBS | TEMPERATURE: 97 F | RESPIRATION RATE: 17 BRPM

## 2023-02-03 DIAGNOSIS — C56.2 MALIGNANT NEOPLASM OF LEFT OVARY: Primary | ICD-10-CM

## 2023-02-03 DIAGNOSIS — C56.2 MALIGNANT NEOPLASM OF LEFT OVARY: ICD-10-CM

## 2023-02-03 LAB
ALBUMIN SERPL-MCNC: 4.6 G/DL (ref 3.5–5.2)
ALBUMIN/GLOB SERPL: 1.5 G/DL
ALP SERPL-CCNC: 80 U/L (ref 39–117)
ALT SERPL W P-5'-P-CCNC: 13 U/L (ref 1–33)
ANION GAP SERPL CALCULATED.3IONS-SCNC: 9 MMOL/L (ref 5–15)
AST SERPL-CCNC: 13 U/L (ref 1–32)
BASOPHILS # BLD AUTO: 0.01 10*3/MM3 (ref 0–0.2)
BASOPHILS NFR BLD AUTO: 0.1 % (ref 0–1.5)
BILIRUB SERPL-MCNC: 0.2 MG/DL (ref 0–1.2)
BUN SERPL-MCNC: 23 MG/DL (ref 6–20)
BUN/CREAT SERPL: 21.3 (ref 7–25)
CALCIUM SPEC-SCNC: 9.9 MG/DL (ref 8.6–10.5)
CHLORIDE SERPL-SCNC: 104 MMOL/L (ref 98–107)
CO2 SERPL-SCNC: 25 MMOL/L (ref 22–29)
CREAT SERPL-MCNC: 1.08 MG/DL (ref 0.57–1)
DEPRECATED RDW RBC AUTO: 46.3 FL (ref 37–54)
EGFRCR SERPLBLD CKD-EPI 2021: 59.3 ML/MIN/1.73
EOSINOPHIL # BLD AUTO: 0 10*3/MM3 (ref 0–0.4)
EOSINOPHIL NFR BLD AUTO: 0 % (ref 0.3–6.2)
ERYTHROCYTE [DISTWIDTH] IN BLOOD BY AUTOMATED COUNT: 14.5 % (ref 12.3–15.4)
GLOBULIN UR ELPH-MCNC: 3.1 GM/DL
GLUCOSE SERPL-MCNC: 130 MG/DL (ref 65–99)
HCT VFR BLD AUTO: 36.2 % (ref 34–46.6)
HGB BLD-MCNC: 11.5 G/DL (ref 12–15.9)
IMM GRANULOCYTES # BLD AUTO: 0.03 10*3/MM3 (ref 0–0.05)
IMM GRANULOCYTES NFR BLD AUTO: 0.4 % (ref 0–0.5)
LYMPHOCYTES # BLD AUTO: 0.72 10*3/MM3 (ref 0.7–3.1)
LYMPHOCYTES NFR BLD AUTO: 8.8 % (ref 19.6–45.3)
MAGNESIUM SERPL-MCNC: 1.9 MG/DL (ref 1.6–2.6)
MCH RBC QN AUTO: 28.4 PG (ref 26.6–33)
MCHC RBC AUTO-ENTMCNC: 31.8 G/DL (ref 31.5–35.7)
MCV RBC AUTO: 89.4 FL (ref 79–97)
MONOCYTES # BLD AUTO: 0.29 10*3/MM3 (ref 0.1–0.9)
MONOCYTES NFR BLD AUTO: 3.5 % (ref 5–12)
NEUTROPHILS NFR BLD AUTO: 7.12 10*3/MM3 (ref 1.7–7)
NEUTROPHILS NFR BLD AUTO: 87.2 % (ref 42.7–76)
NRBC BLD AUTO-RTO: 0 /100 WBC (ref 0–0.2)
PLATELET # BLD AUTO: 330 10*3/MM3 (ref 140–450)
PMV BLD AUTO: 9.3 FL (ref 6–12)
POTASSIUM SERPL-SCNC: 4.6 MMOL/L (ref 3.5–5.2)
PROT SERPL-MCNC: 7.7 G/DL (ref 6–8.5)
RBC # BLD AUTO: 4.05 10*6/MM3 (ref 3.77–5.28)
SODIUM SERPL-SCNC: 138 MMOL/L (ref 136–145)
WBC NRBC COR # BLD: 8.17 10*3/MM3 (ref 3.4–10.8)

## 2023-02-03 PROCEDURE — 25010000002 PEGFILGRASTIM 6 MG/0.6ML PREFILLED SYRINGE KIT: Performed by: INTERNAL MEDICINE

## 2023-02-03 PROCEDURE — 25010000002 PACLITAXEL PER 1 MG: Performed by: INTERNAL MEDICINE

## 2023-02-03 PROCEDURE — 96413 CHEMO IV INFUSION 1 HR: CPT

## 2023-02-03 PROCEDURE — 25010000002 DEXAMETHASONE SODIUM PHOSPHATE 100 MG/10ML SOLUTION: Performed by: INTERNAL MEDICINE

## 2023-02-03 PROCEDURE — 25010000002 HEPARIN LOCK FLUSH PER 10 UNITS: Performed by: INTERNAL MEDICINE

## 2023-02-03 PROCEDURE — 96367 TX/PROPH/DG ADDL SEQ IV INF: CPT

## 2023-02-03 PROCEDURE — 25010000002 DIPHENHYDRAMINE PER 50 MG: Performed by: INTERNAL MEDICINE

## 2023-02-03 PROCEDURE — 36415 COLL VENOUS BLD VENIPUNCTURE: CPT

## 2023-02-03 PROCEDURE — 25010000002 FOSAPREPITANT PER 1 MG: Performed by: INTERNAL MEDICINE

## 2023-02-03 PROCEDURE — 96377 APPLICATON ON-BODY INJECTOR: CPT

## 2023-02-03 PROCEDURE — 96415 CHEMO IV INFUSION ADDL HR: CPT

## 2023-02-03 PROCEDURE — 80053 COMPREHEN METABOLIC PANEL: CPT

## 2023-02-03 PROCEDURE — 96417 CHEMO IV INFUS EACH ADDL SEQ: CPT

## 2023-02-03 PROCEDURE — 83735 ASSAY OF MAGNESIUM: CPT

## 2023-02-03 PROCEDURE — 25010000002 CARBOPLATIN PER 50 MG: Performed by: INTERNAL MEDICINE

## 2023-02-03 PROCEDURE — 25010000002 PALONOSETRON PER 25 MCG: Performed by: INTERNAL MEDICINE

## 2023-02-03 PROCEDURE — 85025 COMPLETE CBC W/AUTO DIFF WBC: CPT

## 2023-02-03 PROCEDURE — 96375 TX/PRO/DX INJ NEW DRUG ADDON: CPT

## 2023-02-03 RX ORDER — SODIUM CHLORIDE 9 MG/ML
250 INJECTION, SOLUTION INTRAVENOUS ONCE
Status: COMPLETED | OUTPATIENT
Start: 2023-02-03 | End: 2023-02-03

## 2023-02-03 RX ORDER — SODIUM CHLORIDE 0.9 % (FLUSH) 0.9 %
10 SYRINGE (ML) INJECTION AS NEEDED
Status: DISCONTINUED | OUTPATIENT
Start: 2023-02-03 | End: 2023-02-03 | Stop reason: HOSPADM

## 2023-02-03 RX ORDER — HEPARIN SODIUM (PORCINE) LOCK FLUSH IV SOLN 100 UNIT/ML 100 UNIT/ML
500 SOLUTION INTRAVENOUS AS NEEDED
Status: CANCELLED | OUTPATIENT
Start: 2023-02-03

## 2023-02-03 RX ORDER — HEPARIN SODIUM (PORCINE) LOCK FLUSH IV SOLN 100 UNIT/ML 100 UNIT/ML
500 SOLUTION INTRAVENOUS AS NEEDED
Status: DISCONTINUED | OUTPATIENT
Start: 2023-02-03 | End: 2023-02-03 | Stop reason: HOSPADM

## 2023-02-03 RX ORDER — DIPHENHYDRAMINE HYDROCHLORIDE 50 MG/ML
50 INJECTION INTRAMUSCULAR; INTRAVENOUS AS NEEDED
Status: DISCONTINUED | OUTPATIENT
Start: 2023-02-03 | End: 2023-02-03 | Stop reason: HOSPADM

## 2023-02-03 RX ORDER — FAMOTIDINE 10 MG/ML
20 INJECTION, SOLUTION INTRAVENOUS AS NEEDED
Status: DISCONTINUED | OUTPATIENT
Start: 2023-02-03 | End: 2023-02-03 | Stop reason: HOSPADM

## 2023-02-03 RX ORDER — FAMOTIDINE 10 MG/ML
20 INJECTION, SOLUTION INTRAVENOUS ONCE
Status: COMPLETED | OUTPATIENT
Start: 2023-02-03 | End: 2023-02-03

## 2023-02-03 RX ORDER — PALONOSETRON 0.05 MG/ML
0.25 INJECTION, SOLUTION INTRAVENOUS ONCE
Status: COMPLETED | OUTPATIENT
Start: 2023-02-03 | End: 2023-02-03

## 2023-02-03 RX ORDER — SODIUM CHLORIDE 0.9 % (FLUSH) 0.9 %
10 SYRINGE (ML) INJECTION AS NEEDED
Status: CANCELLED | OUTPATIENT
Start: 2023-02-03

## 2023-02-03 RX ADMIN — DEXAMETHASONE SODIUM PHOSPHATE 12 MG: 10 INJECTION, SOLUTION INTRAMUSCULAR; INTRAVENOUS at 09:14

## 2023-02-03 RX ADMIN — FAMOTIDINE 20 MG: 10 INJECTION, SOLUTION INTRAVENOUS at 09:13

## 2023-02-03 RX ADMIN — PEGFILGRASTIM 6 MG: KIT SUBCUTANEOUS at 14:24

## 2023-02-03 RX ADMIN — DIPHENHYDRAMINE HYDROCHLORIDE 50 MG: 50 INJECTION, SOLUTION INTRAMUSCULAR; INTRAVENOUS at 09:31

## 2023-02-03 RX ADMIN — SODIUM CHLORIDE 250 ML: 9 INJECTION, SOLUTION INTRAVENOUS at 09:10

## 2023-02-03 RX ADMIN — PACLITAXEL 335 MG: 6 INJECTION, SOLUTION INTRAVENOUS at 10:27

## 2023-02-03 RX ADMIN — FOSAPREPITANT 150 MG: 150 INJECTION, POWDER, LYOPHILIZED, FOR SOLUTION INTRAVENOUS at 09:50

## 2023-02-03 RX ADMIN — Medication 10 ML: at 14:22

## 2023-02-03 RX ADMIN — CARBOPLATIN 570 MG: 450 INJECTION, SOLUTION INTRAVENOUS at 13:30

## 2023-02-03 RX ADMIN — PALONOSETRON HYDROCHLORIDE 0.25 MG: 0.25 INJECTION, SOLUTION INTRAVENOUS at 09:10

## 2023-02-03 RX ADMIN — HEPARIN SODIUM (PORCINE) LOCK FLUSH IV SOLN 100 UNIT/ML 500 UNITS: 100 SOLUTION at 14:22

## 2023-02-10 ENCOUNTER — TELEPHONE (OUTPATIENT)
Dept: ONCOLOGY | Facility: CLINIC | Age: 58
End: 2023-02-10
Payer: COMMERCIAL

## 2023-02-10 DIAGNOSIS — R79.0 LOW MAGNESIUM LEVEL: Primary | ICD-10-CM

## 2023-02-10 RX ORDER — MAGNESIUM OXIDE 400 MG/1
400 TABLET ORAL 2 TIMES DAILY
Qty: 6 TABLET | Refills: 0 | Status: SHIPPED | OUTPATIENT
Start: 2023-02-10 | End: 2023-03-03 | Stop reason: SDUPTHER

## 2023-02-10 NOTE — TELEPHONE ENCOUNTER
----- Message from Walker Magana MD sent at 2/10/2023 11:58 AM CST -----  Low magnesium 1.6.  Order 2 g IV magnesium.

## 2023-02-10 NOTE — TELEPHONE ENCOUNTER
Contacted patient Tiff Elizalde regarding her low Magnesium Level of 1.6, patient informed Dr Magana is prescribing oral Magnesium tabs bid x 3 days and repeat her Mag level @ her local hospital [facility]  Patient instructed Magnesium can cause diarrhea if this occurs d/c use of the tablets and contact the office. Patient v/u of these instructions.     Order faxed to lab [facility]

## 2023-02-13 ENCOUNTER — TELEPHONE (OUTPATIENT)
Dept: ONCOLOGY | Facility: CLINIC | Age: 58
End: 2023-02-13
Payer: COMMERCIAL

## 2023-02-13 RX ORDER — MAGNESIUM SULFATE HEPTAHYDRATE 40 MG/ML
2 INJECTION, SOLUTION INTRAVENOUS ONCE
Status: CANCELLED
Start: 2023-02-15

## 2023-02-13 NOTE — TELEPHONE ENCOUNTER
----- Message from Walker Magana MD sent at 2/13/2023 11:48 AM CST -----  Disregard magnesium order.  Magnesium normal today.

## 2023-02-13 NOTE — TELEPHONE ENCOUNTER
Contacted patient Tiff Elizalde, she was informed that her Magnesium level still remains low @ 1.6 per [facility] lab calibrations 1.8-2.4  Patient values remain the same as Friday 2/10/23 Magnesium level of 1.6 even after 3 days of oral Magnesium tablets bid and patient increasing her Magnesium rich foods over the past weekend.     Apt [facility] for IV Mag Run: Wed 2/15/23 @ 8:30 am per patient request (due to her work [facility]).   Patient v/u of time and date of apt.

## 2023-02-13 NOTE — TELEPHONE ENCOUNTER
Notified patient Tiff Elizalde, we had received a revised lab result from [facility] regarding her Magnesium level of 1.9  At this time she will not need a Mag Run  Patient v/u.  Contacted (CC) [facility] she will cancel apt  Contacted [facility] he will discontinue the Magnesium order for Wed 2/15/23

## 2023-02-13 NOTE — TELEPHONE ENCOUNTER
----- Message from Walker Magana MD sent at 2/13/2023 11:47 AM CST -----  Magnesium 1.6.  Order 2 g IV magnesium.    Alkaline phosphatase 153 and GFR 49 mL/min.    WBC 14.2, hemoglobin 11.8, hematocrit 35.7, MCV 87.5 and platelet 200.

## 2023-02-15 RX ORDER — PALONOSETRON 0.05 MG/ML
0.25 INJECTION, SOLUTION INTRAVENOUS ONCE
Status: CANCELLED | OUTPATIENT
Start: 2023-02-24

## 2023-02-15 RX ORDER — FAMOTIDINE 10 MG/ML
20 INJECTION, SOLUTION INTRAVENOUS AS NEEDED
Status: CANCELLED | OUTPATIENT
Start: 2023-02-24

## 2023-02-15 RX ORDER — FAMOTIDINE 10 MG/ML
20 INJECTION, SOLUTION INTRAVENOUS ONCE
Status: CANCELLED | OUTPATIENT
Start: 2023-02-24

## 2023-02-15 RX ORDER — SODIUM CHLORIDE 9 MG/ML
250 INJECTION, SOLUTION INTRAVENOUS ONCE
Status: CANCELLED | OUTPATIENT
Start: 2023-02-24

## 2023-02-15 RX ORDER — DIPHENHYDRAMINE HYDROCHLORIDE 50 MG/ML
50 INJECTION INTRAMUSCULAR; INTRAVENOUS AS NEEDED
Status: CANCELLED | OUTPATIENT
Start: 2023-02-24

## 2023-02-15 NOTE — PROGRESS NOTES
[facility] HEMATOLOGY & ONCOLOGY [place] [id number] [facility] [address]  [facility] [address] [phone]    Patient Name: Tiff Elizalde  Encounter Date: 02/22/2023  YOB: 1965  Patient Number: [id number]      REASON FOR FOLLOW-UP: Tiff Elizalde is a pleasant 57 y.o.  female who is seen on follow-up for left ovarian cancer. She is seen C2D20 of adjuvant paclitaxel and carboplatin.  She had hypersensitivity reaction to paclitaxel on C1D1.  She is seen with spouse, Dennis. History is obtained from patient.  She is a reliable historian.         Oncology/Hematology History Overview Note   DIAGNOSTIC ABNORMALITIES:   Pain lower back and left leg swelling.  She presented to her PCP.   CT scan done at [facility]. Details not available.  Patient seen by Dr. Virgilio Hayward 11/23/2022. Patient found to have a large pelvic mass causing bilateral hydronephrosis with resultant nonfunctioning left kidney, elevated creatinine, left deep venous thrombosis and pulmonary embolus.  Pathology report 11/28/2022.  Fallopian tube and ovary, left salpingo-oophorectomy: Fallopian tube with no evidence of involvement by malignancy.  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  No surface involvement demonstrated.  Uterus, fallopian tube and ovary, hysterectomy with right salpingo-oophorectomy: Cervix and endocervix with no evidence of squamous or glandular dysplasia.  Chronic cervicitis present.  Weakly proliferative endometrium, negative for hyperplasia, carcinoma, and endometritis.  Benign endometrial polyp present.  Adenomyosis.  No leiomyomata.  Right fallopian tube with focal involvement by serous neoplasm with Samona by this most consistent with low-grade serous carcinoma.  Remnant left ovary with adhesions to uterine serosa and focal involvement by low-grade serous carcinoma.  Right ovary with surface involvement by  low-grade serous carcinoma.  Omentum biopsy: Involved by low-grade serous carcinoma, invasive).  Sidewall, left pelvic biopsy: Edema with hemorrhage and chronic inflammation, negative for endometriosis and malignancy.  Lymph node left pelvic biopsy: 1 lymph node with a 0.9 mm focus of metastatic serous carcinoma surrounding adipose tissue with hemorrhage and edema.           PREVIOUS INTERVENTIONS:  IVC filter was placed 11/22/2022 at [facility].  She had undergone exploratory laparotomy, total abdominal hysterectomy, bilateral salpingo-oophorectomy, debulking, left pelvic lymph node dissection and omentectomy by Dr. Hayward on 11/23/2022.  Estimated blood loss 400 ml.  Laparotomy showed a massive mass arising from the left ovary, retroperitoneal, infiltrating the retroperitoneal space and adherent to the sigmoid mesentery.  Within the retroperitoneum it was densely adherent to the iliac vessel on the left side and ureter.  Deliberate rupture of the mass was required in order to access the retroperitoneal attachment.  The mass was sent for frozen section and determined to be at least borderline tumor possibly low-grade serous tumor.  There was no other evidence of disease in the pelvis or upper abdomen.  Due to being densely adherent to the retroperitoneum and iliac vessels, it is unclear whether the entire tumor was removed or not and the pelvic sidewall biopsy was taken to determine whether the residual ovary was present and not a fibrotic reaction.  Bilateral ureterolysis was required.   Hypersensitivity reaction to Taxol 01/09/2023.   Adjuvant paclitaxel and carboplatin 01/10/2023 through present.      Ovarian cancer (HCC)   12/15/2022 Initial Diagnosis    Ovarian cancer (HCC)     1/9/2023 -  Chemotherapy    OP OVARIAN PACLitaxel / CARBOplatin (Q21D)     1/9/2023 -  Chemotherapy    OP CENTRAL VENOUS ACCESS DEVICE ACCESS, CARE, AND MAINTENANCE (CVAD)     1/20/2023 Cancer Staged    Staging form: Ovary, Fallopian  Tube, And Primary Peritoneal Carcinoma, AJCC 8th Edition  - Pathologic stage from 1/20/2023: FIGO Stage III (pT3, pN1, cM0) - Signed by Walker Magana MD on 1/20/2023     2/15/2023 - 2/15/2023 Chemotherapy    OP MAGNESIUM          PAST MEDICAL HISTORY:  ALLERGIES:  No Known Allergies  CURRENT MEDICATIONS:  Outpatient Encounter Medications as of 2/22/2023   Medication Sig Dispense Refill   • Calcium Carbonate-Vit D-Min (CALCIUM 1200 PO) Take  by mouth.     • dexamethasone (DECADRON) 4 MG tablet Take 1 pill night before chemo and morning of chemo. 10 tablet 2   • Eliquis 5 MG tablet tablet 5 mg Every 12 (Twelve) Hours.     • ferrous sulfate 325 (65 FE) MG tablet Take 1 tablet by mouth Daily With Breakfast. 60 tablet 2   • HYDROcodone-acetaminophen (NORCO) 7.5-325 MG per tablet Take 1 tablet by mouth Every 4 (Four) Hours As Needed for Moderate Pain (Pain). 15 tablet 0   • lidocaine-prilocaine (EMLA) 2.5-2.5 % cream Apply to port site 30 minutes prior to being accessed. 30 g 0   • lisinopril-hydrochlorothiazide (PRINZIDE,ZESTORETIC) 20-12.5 MG per tablet Take 1 tablet by mouth Daily.     • magnesium oxide (MAG-OX) 400 MG tablet Take 1 tablet by mouth 2 (Two) Times a Day. 6 tablet 0   • ondansetron (Zofran) 8 MG tablet Take 1 tablet by mouth Every 8 (Eight) Hours As Needed for Nausea or Vomiting. 60 tablet 2   • prochlorperazine (COMPAZINE) 10 MG tablet Take 1 tablet by mouth Every 6 (Six) Hours As Needed for Nausea. 60 tablet 2   • vitamin D3 125 MCG (5000 UT) capsule capsule Take  by mouth Daily.     • [DISCONTINUED] dexamethasone (DECADRON) 4 MG tablet Take 1 pill night before chemo and morning of chemo. 10 tablet 1     No facility-administered encounter medications on file as of 2/22/2023.     ADULT ILLNESSES:  Patient Active Problem List   Diagnosis Code   • Ovarian cancer (HCC) C56.9     SURGERIES:  Past Surgical History:   Procedure Laterality Date   • VENA CAVA FILTER INSERTION     • VENOUS ACCESS DEVICE (PORT)  "INSERTION N/A 12/30/2022    Procedure: INSERTION VENOUS ACCESS DEVICE;  Surgeon: Holley Mcgill MD;  Location: [facility] OR;  Service: General;  Laterality: N/A;     HEALTH MAINTENANCE ITEMS:  Health Maintenance Due   Topic Date Due   • MAMMOGRAM  Never done   • COLORECTAL CANCER SCREENING  Never done   • COVID-19 Vaccine (1) Never done   • TDAP/TD VACCINES (2 - Tdap) 02/24/2008   • ZOSTER VACCINE (1 of 2) Never done   • INFLUENZA VACCINE  Never done   • HEPATITIS C SCREENING  Never done   • ANNUAL PHYSICAL  Never done   • PAP SMEAR  Never done       <no information>  Last Completed Colonoscopy     This patient has no relevant Health Maintenance data.          There is no immunization history on file for this patient.  Last Completed Mammogram     This patient has no relevant Health Maintenance data.            FAMILY HISTORY:  No family history on file.  SOCIAL HISTORY:  Social History     Socioeconomic History   • Marital status:    Tobacco Use   • Smoking status: Never   Vaping Use   • Vaping Use: Never used   Substance and Sexual Activity   • Alcohol use: Never   • Drug use: Never   • Sexual activity: Defer       REVIEW OF SYSTEMS:    Review of Systems   Constitutional: Positive for fatigue. Negative for fever and unexpected weight change.   HENT: Negative for congestion, mouth sores and trouble swallowing.    Eyes: Negative for redness and visual disturbance.   Respiratory: Negative for cough, shortness of breath and wheezing.    Cardiovascular: Negative for chest pain and palpitations.   Gastrointestinal: Negative for abdominal pain, nausea and vomiting.   Endocrine: Negative for polydipsia and polyphagia.   Genitourinary: Negative for difficulty urinating, dysuria and hematuria.   Musculoskeletal: Negative for gait problem and myalgias.   Skin: Positive for pallor.   Allergic/Immunologic: Negative for food allergies.   Neurological: Negative for dizziness and speech difficulty.        \"Tingling " "sensation fingertips and toes.\"   Hematological: Negative for adenopathy. Does not bruise/bleed easily.   Psychiatric/Behavioral: Negative for agitation, confusion and hallucinations.       VITAL SIGNS: /70   Pulse 70   Temp 96.5 °F (35.8 °C)   Resp 18   Ht 170.2 cm (67.01\")   Wt 77.7 kg (171 lb 6.4 oz)   SpO2 95%   Breastfeeding No   BMI 26.84 kg/m²  Lost 6 pounds. \"Been walking and makes me feel better.\"  Pain Score    02/22/23 0925   PainSc: 0-No pain       PHYSICAL EXAMINATION:     Physical Exam  Vitals reviewed.   Constitutional:       General: She is not in acute distress.  HENT:      Head: Normocephalic and atraumatic.   Eyes:      General: No scleral icterus.  Cardiovascular:      Rate and Rhythm: Normal rate.   Pulmonary:      Effort: No respiratory distress.      Breath sounds: No wheezing or rales.      Comments: Port, left. No erythema.  Abdominal:      General: Bowel sounds are normal.      Palpations: Abdomen is soft.      Tenderness: There is no abdominal tenderness.   Musculoskeletal:         General: No swelling.      Cervical back: Neck supple.   Skin:     General: Skin is warm.      Coloration: Skin is pale.   Neurological:      Mental Status: She is alert and oriented to person, place, and time.   Psychiatric:         Mood and Affect: Mood normal.         Behavior: Behavior normal.         Thought Content: Thought content normal.         Judgment: Judgment normal.         LABS    Lab Results - Last 18 Months   Lab Units 02/03/23  0815 01/09/23  0845 12/28/22  1337 12/15/22  1419   HEMOGLOBIN g/dL 11.5* 11.8* 11.3* 11.4*   HEMATOCRIT % 36.2 37.6 34.7 36.7   MCV fL 89.4 89.7 88.1 90.6   WBC 10*3/mm3 8.17 8.55 8.87 9.66   RDW % 14.5 13.2 13.3 13.2   MPV fL 9.3 9.9 9.6 10.2   PLATELETS 10*3/mm3 330 282 308 313   IMM GRAN % % 0.4 0.4  --  0.4   NEUTROS ABS 10*3/mm3 7.12* 6.02  --  6.37   LYMPHS ABS 10*3/mm3 0.72 1.68  --  2.22   MONOS ABS 10*3/mm3 0.29 0.68  --  0.79   EOS ABS 10*3/mm3 " "0.00 0.09  --  0.18   BASOS ABS 10*3/mm3 0.01 0.05  --  0.06   IMMATURE GRANS (ABS) 10*3/mm3 0.03 0.03  --  0.04   NRBC /100 WBC 0.0 0.0  --  0.0       Lab Results - Last 18 Months   Lab Units 02/03/23  0815 01/09/23  0845 12/28/22  1337 12/15/22  1419   GLUCOSE mg/dL 130* 90 97 106*   SODIUM mmol/L 138 142 144 142   POTASSIUM mmol/L 4.6 4.1 3.8 4.2   CO2 mmol/L 25.0 29.0 30.0* 28.0   CHLORIDE mmol/L 104 105 104 104   ANION GAP mmol/L 9.0 8.0 10.0 10.0   CREATININE mg/dL 1.08* 1.37* 1.29* 1.69*   BUN mg/dL 23* 17 17 22*   BUN / CREAT RATIO  21.3 12.4 13.2 13.0   CALCIUM mg/dL 9.9 9.9 9.8 10.0   ALK PHOS U/L 80 71 68 79   TOTAL PROTEIN g/dL 7.7 7.4 7.4 7.5   ALT (SGPT) U/L 13 10 14 9   AST (SGOT) U/L 13 14 18 12   BILIRUBIN mg/dL 0.2 0.4 0.2 0.3   ALBUMIN g/dL 4.6 4.3 4.2 3.90   GLOBULIN gm/dL 3.1 3.1 3.2 3.6       No results for input(s): MSPIKE, KAPPALAMB, IGLFLC, URICACID, FREEKAPPAL, CEA, LDH, REFLABREPO in the last 21680 hours.    Lab Results - Last 18 Months   Lab Units 01/09/23  0845   IRON mcg/dL 35*   TIBC mcg/dL 308   IRON SATURATION % 11*   FERRITIN ng/mL 379.80*       Tiff Elizalde reports a pain score of 0.        ASSESSMENT:  1.  Ovarian cancer, low-grade serous carcinoma, left.  Tumor size 18 cm  AJCC stage:IIIA1 (pT3, pN1, cM0, G1)  Treatment status:Taking adjuvant Taxol and carboplatin 01/10/2023 through present.  2.  Performance status of 1.  3.  Left deep venous thrombosis from malignancy. Taking apixaban.   4.  Pulmonary embolism from malignancy. Taking apixaban.  Post IVC filter 11/22/2022.  5.  Anemia from iron deficiency. Taking oral iron 01/10/2023 through present.    6.  Hypersensitivity reaction to paclitaxel on 01/09/2023. On Decadron premed.   7.  Grade 1 neuropathy. Observation. Hold gabapentin.           PLAN:  1.   Re: Tolerance to oral Decadron as premed. \"Had not had a reaction.\"  2.   Re: Tolerance to oral iron.  \"Still taking it.\"  3.   Re: Heme status.   WBC 8.3, " "hemoglobin 11, hematocrit 34.2, MCV 87.5 platelet 127.  4.   Re: CMP.  GFR 47 from  44.6 from 47 mL per minute   5.   Re: Magnesium 1.9.  6.   Re: Anti histamine prior to Neulasta.  \"Take it night before and morning of.\"   7.  Continue adjuvant chemo.  Monitor for recurrent infusion reactions, anaphylaxis, nausea, vomiting,alopecia, neuropathy and cytopenias.  8.  Schedule chemo C3D1 on 02/24/2023 and every 21 days.  Plan for 6 cycles.    Carboplatin AUC = 6.  Taxol 175 mg/m2 over 3 hours.   9.  Premedicate with:  Aloxi 0.25 mg IV  Emend 150 mg IV  Decadron 12 mg IV  Pepcid 20 mg IV  Benadryl 50 mg IV  10. Neulasta Onpro 6 mg D1 of chemo as primary prophylaxis. High risk for febrile neutropenia.    11.  Weekly CBC with differential, CMP, and magnesium.  12.  eRx for Zofran 8 mg p.o. every 8 hours as needed for nausea and vomiting #60, 2 refills if needed.  13.  eRx for Compazine 10 mg p.o. every 4 hours as needed for nausea and vomiting #60, 2 refills if needed.  14.  eRx Decadron 4 mg po start night before chemo and on the day of chemo, 10, 2 refills.  15.  eRx gabapentin 300 mg po three times daily, 90, 2 refills if needed. Observe for somnolence and dizziness.  16.  Continue ongoing management per primary care physician and other specialists.  17.  Plan of care discussed with patient and spouse Dennis.  Understanding expressed.  Patient agreeable to proceed.  18.  Questions were answered to her satisfaction. \"Good\"  19.  Return to office in 3 weeks.  20.  Obtain CT scans from [facility].        I have reviewed the assessment and plan and verified the accuracy of it. No changes to assessment and plan since the information was documented. Walker Magana MD 02/22/23       I spent 40 total minutes, face-to-face, caring for Tiff today.  Greater than 50% of this time involved counseling and/or coordination of care as documented within this note regarding the patient's illness(es), pros and " cons of various treatment options, instructions and/or risk reduction.           (Holley Mcgill MD)  MD Tyrone Galindo MD

## 2023-02-22 ENCOUNTER — OFFICE VISIT (OUTPATIENT)
Dept: ONCOLOGY | Facility: CLINIC | Age: 58
End: 2023-02-22
Payer: COMMERCIAL

## 2023-02-22 VITALS
HEIGHT: 67 IN | HEART RATE: 70 BPM | WEIGHT: 171.4 LBS | DIASTOLIC BLOOD PRESSURE: 70 MMHG | BODY MASS INDEX: 26.9 KG/M2 | RESPIRATION RATE: 18 BRPM | TEMPERATURE: 96.5 F | OXYGEN SATURATION: 95 % | SYSTOLIC BLOOD PRESSURE: 126 MMHG

## 2023-02-22 DIAGNOSIS — C56.2 MALIGNANT NEOPLASM OF LEFT OVARY: Primary | ICD-10-CM

## 2023-02-22 PROCEDURE — 99215 OFFICE O/P EST HI 40 MIN: CPT | Performed by: INTERNAL MEDICINE

## 2023-02-22 RX ORDER — DEXAMETHASONE 4 MG/1
TABLET ORAL
Qty: 10 TABLET | Refills: 2 | Status: SHIPPED | OUTPATIENT
Start: 2023-02-22

## 2023-02-24 ENCOUNTER — INFUSION (OUTPATIENT)
Dept: ONCOLOGY | Facility: HOSPITAL | Age: 58
End: 2023-02-24
Payer: COMMERCIAL

## 2023-02-24 ENCOUNTER — LAB (OUTPATIENT)
Dept: LAB | Facility: HOSPITAL | Age: 58
End: 2023-02-24
Payer: COMMERCIAL

## 2023-02-24 VITALS
OXYGEN SATURATION: 99 % | TEMPERATURE: 98 F | WEIGHT: 177 LBS | SYSTOLIC BLOOD PRESSURE: 146 MMHG | HEIGHT: 67 IN | BODY MASS INDEX: 27.78 KG/M2 | HEART RATE: 96 BPM | DIASTOLIC BLOOD PRESSURE: 67 MMHG | RESPIRATION RATE: 18 BRPM

## 2023-02-24 DIAGNOSIS — C56.2 MALIGNANT NEOPLASM OF LEFT OVARY: ICD-10-CM

## 2023-02-24 DIAGNOSIS — C56.2 MALIGNANT NEOPLASM OF LEFT OVARY: Primary | ICD-10-CM

## 2023-02-24 LAB
ALBUMIN SERPL-MCNC: 4.4 G/DL (ref 3.5–5.2)
ALBUMIN/GLOB SERPL: 1.6 G/DL
ALP SERPL-CCNC: 86 U/L (ref 39–117)
ALT SERPL W P-5'-P-CCNC: 22 U/L (ref 1–33)
ANION GAP SERPL CALCULATED.3IONS-SCNC: 12 MMOL/L (ref 5–15)
AST SERPL-CCNC: 18 U/L (ref 1–32)
BASOPHILS # BLD AUTO: 0.01 10*3/MM3 (ref 0–0.2)
BASOPHILS NFR BLD AUTO: 0.1 % (ref 0–1.5)
BILIRUB SERPL-MCNC: 0.3 MG/DL (ref 0–1.2)
BUN SERPL-MCNC: 24 MG/DL (ref 6–20)
BUN/CREAT SERPL: 22.6 (ref 7–25)
CALCIUM SPEC-SCNC: 10.1 MG/DL (ref 8.6–10.5)
CHLORIDE SERPL-SCNC: 103 MMOL/L (ref 98–107)
CO2 SERPL-SCNC: 23 MMOL/L (ref 22–29)
CREAT SERPL-MCNC: 1.06 MG/DL (ref 0.57–1)
DEPRECATED RDW RBC AUTO: 51.1 FL (ref 37–54)
EGFRCR SERPLBLD CKD-EPI 2021: 60.6 ML/MIN/1.73
EOSINOPHIL # BLD AUTO: 0 10*3/MM3 (ref 0–0.4)
EOSINOPHIL NFR BLD AUTO: 0 % (ref 0.3–6.2)
ERYTHROCYTE [DISTWIDTH] IN BLOOD BY AUTOMATED COUNT: 16 % (ref 12.3–15.4)
GLOBULIN UR ELPH-MCNC: 2.7 GM/DL
GLUCOSE SERPL-MCNC: 140 MG/DL (ref 65–99)
HCT VFR BLD AUTO: 30.9 % (ref 34–46.6)
HGB BLD-MCNC: 10.1 G/DL (ref 12–15.9)
IMM GRANULOCYTES # BLD AUTO: 0.03 10*3/MM3 (ref 0–0.05)
IMM GRANULOCYTES NFR BLD AUTO: 0.3 % (ref 0–0.5)
LYMPHOCYTES # BLD AUTO: 0.88 10*3/MM3 (ref 0.7–3.1)
LYMPHOCYTES NFR BLD AUTO: 9.1 % (ref 19.6–45.3)
MAGNESIUM SERPL-MCNC: 1.7 MG/DL (ref 1.6–2.6)
MCH RBC QN AUTO: 29.2 PG (ref 26.6–33)
MCHC RBC AUTO-ENTMCNC: 32.7 G/DL (ref 31.5–35.7)
MCV RBC AUTO: 89.3 FL (ref 79–97)
MONOCYTES # BLD AUTO: 0.49 10*3/MM3 (ref 0.1–0.9)
MONOCYTES NFR BLD AUTO: 5.1 % (ref 5–12)
NEUTROPHILS NFR BLD AUTO: 8.21 10*3/MM3 (ref 1.7–7)
NEUTROPHILS NFR BLD AUTO: 85.4 % (ref 42.7–76)
NRBC BLD AUTO-RTO: 0 /100 WBC (ref 0–0.2)
PLATELET # BLD AUTO: 140 10*3/MM3 (ref 140–450)
PMV BLD AUTO: 10.1 FL (ref 6–12)
POTASSIUM SERPL-SCNC: 4.3 MMOL/L (ref 3.5–5.2)
PROT SERPL-MCNC: 7.1 G/DL (ref 6–8.5)
RBC # BLD AUTO: 3.46 10*6/MM3 (ref 3.77–5.28)
SODIUM SERPL-SCNC: 138 MMOL/L (ref 136–145)
WBC NRBC COR # BLD: 9.62 10*3/MM3 (ref 3.4–10.8)

## 2023-02-24 PROCEDURE — 25010000002 FOSAPREPITANT PER 1 MG: Performed by: INTERNAL MEDICINE

## 2023-02-24 PROCEDURE — 25010000002 CARBOPLATIN PER 50 MG: Performed by: INTERNAL MEDICINE

## 2023-02-24 PROCEDURE — 96415 CHEMO IV INFUSION ADDL HR: CPT

## 2023-02-24 PROCEDURE — 25010000002 PACLITAXEL PER 1 MG: Performed by: INTERNAL MEDICINE

## 2023-02-24 PROCEDURE — 96413 CHEMO IV INFUSION 1 HR: CPT

## 2023-02-24 PROCEDURE — 85025 COMPLETE CBC W/AUTO DIFF WBC: CPT

## 2023-02-24 PROCEDURE — 25010000002 HEPARIN LOCK FLUSH PER 10 UNITS: Performed by: INTERNAL MEDICINE

## 2023-02-24 PROCEDURE — 25010000002 PALONOSETRON PER 25 MCG: Performed by: INTERNAL MEDICINE

## 2023-02-24 PROCEDURE — 80053 COMPREHEN METABOLIC PANEL: CPT

## 2023-02-24 PROCEDURE — 96375 TX/PRO/DX INJ NEW DRUG ADDON: CPT

## 2023-02-24 PROCEDURE — 96367 TX/PROPH/DG ADDL SEQ IV INF: CPT

## 2023-02-24 PROCEDURE — 25010000002 PEGFILGRASTIM 6 MG/0.6ML PREFILLED SYRINGE KIT: Performed by: INTERNAL MEDICINE

## 2023-02-24 PROCEDURE — 96417 CHEMO IV INFUS EACH ADDL SEQ: CPT

## 2023-02-24 PROCEDURE — 36415 COLL VENOUS BLD VENIPUNCTURE: CPT

## 2023-02-24 PROCEDURE — 83735 ASSAY OF MAGNESIUM: CPT

## 2023-02-24 PROCEDURE — 96377 APPLICATON ON-BODY INJECTOR: CPT

## 2023-02-24 PROCEDURE — 25010000002 DIPHENHYDRAMINE PER 50 MG: Performed by: INTERNAL MEDICINE

## 2023-02-24 PROCEDURE — 25010000002 DEXAMETHASONE SODIUM PHOSPHATE 100 MG/10ML SOLUTION: Performed by: INTERNAL MEDICINE

## 2023-02-24 RX ORDER — HEPARIN SODIUM (PORCINE) LOCK FLUSH IV SOLN 100 UNIT/ML 100 UNIT/ML
500 SOLUTION INTRAVENOUS AS NEEDED
Status: DISCONTINUED | OUTPATIENT
Start: 2023-02-24 | End: 2023-02-24 | Stop reason: HOSPADM

## 2023-02-24 RX ORDER — HEPARIN SODIUM (PORCINE) LOCK FLUSH IV SOLN 100 UNIT/ML 100 UNIT/ML
500 SOLUTION INTRAVENOUS AS NEEDED
Status: CANCELLED | OUTPATIENT
Start: 2023-02-24

## 2023-02-24 RX ORDER — FAMOTIDINE 10 MG/ML
20 INJECTION, SOLUTION INTRAVENOUS ONCE
Status: COMPLETED | OUTPATIENT
Start: 2023-02-24 | End: 2023-02-24

## 2023-02-24 RX ORDER — SODIUM CHLORIDE 0.9 % (FLUSH) 0.9 %
10 SYRINGE (ML) INJECTION AS NEEDED
Status: CANCELLED | OUTPATIENT
Start: 2023-02-24

## 2023-02-24 RX ORDER — FAMOTIDINE 10 MG/ML
20 INJECTION, SOLUTION INTRAVENOUS AS NEEDED
Status: DISCONTINUED | OUTPATIENT
Start: 2023-02-24 | End: 2023-02-24 | Stop reason: HOSPADM

## 2023-02-24 RX ORDER — PALONOSETRON 0.05 MG/ML
0.25 INJECTION, SOLUTION INTRAVENOUS ONCE
Status: COMPLETED | OUTPATIENT
Start: 2023-02-24 | End: 2023-02-24

## 2023-02-24 RX ORDER — SODIUM CHLORIDE 0.9 % (FLUSH) 0.9 %
10 SYRINGE (ML) INJECTION AS NEEDED
Status: DISCONTINUED | OUTPATIENT
Start: 2023-02-24 | End: 2023-02-24 | Stop reason: HOSPADM

## 2023-02-24 RX ORDER — SODIUM CHLORIDE 9 MG/ML
250 INJECTION, SOLUTION INTRAVENOUS ONCE
Status: COMPLETED | OUTPATIENT
Start: 2023-02-24 | End: 2023-02-24

## 2023-02-24 RX ORDER — DIPHENHYDRAMINE HYDROCHLORIDE 50 MG/ML
50 INJECTION INTRAMUSCULAR; INTRAVENOUS AS NEEDED
Status: DISCONTINUED | OUTPATIENT
Start: 2023-02-24 | End: 2023-02-24 | Stop reason: HOSPADM

## 2023-02-24 RX ADMIN — CARBOPLATIN 580 MG: 10 INJECTION, SOLUTION INTRAVENOUS at 13:30

## 2023-02-24 RX ADMIN — DEXAMETHASONE SODIUM PHOSPHATE 12 MG: 10 INJECTION, SOLUTION INTRAMUSCULAR; INTRAVENOUS at 09:07

## 2023-02-24 RX ADMIN — HEPARIN SODIUM (PORCINE) LOCK FLUSH IV SOLN 100 UNIT/ML 500 UNITS: 100 SOLUTION at 14:17

## 2023-02-24 RX ADMIN — Medication 10 ML: at 14:15

## 2023-02-24 RX ADMIN — PEGFILGRASTIM 6 MG: KIT SUBCUTANEOUS at 14:24

## 2023-02-24 RX ADMIN — PALONOSETRON HYDROCHLORIDE 0.25 MG: 0.25 INJECTION, SOLUTION INTRAVENOUS at 09:04

## 2023-02-24 RX ADMIN — FAMOTIDINE 20 MG: 10 INJECTION INTRAVENOUS at 09:02

## 2023-02-24 RX ADMIN — DIPHENHYDRAMINE HYDROCHLORIDE 50 MG: 50 INJECTION, SOLUTION INTRAMUSCULAR; INTRAVENOUS at 09:25

## 2023-02-24 RX ADMIN — PACLITAXEL 335 MG: 6 INJECTION, SOLUTION INTRAVENOUS at 10:19

## 2023-02-24 RX ADMIN — FOSAPREPITANT 150 MG: 150 INJECTION, POWDER, LYOPHILIZED, FOR SOLUTION INTRAVENOUS at 09:46

## 2023-02-24 RX ADMIN — SODIUM CHLORIDE 250 ML: 9 INJECTION, SOLUTION INTRAVENOUS at 09:01

## 2023-03-03 DIAGNOSIS — R79.0 LOW MAGNESIUM LEVEL: Primary | ICD-10-CM

## 2023-03-03 RX ORDER — MAGNESIUM OXIDE 400 MG/1
400 TABLET ORAL 2 TIMES DAILY
Qty: 6 TABLET | Refills: 0 | Status: SHIPPED | OUTPATIENT
Start: 2023-03-03

## 2023-03-09 RX ORDER — SODIUM CHLORIDE 9 MG/ML
250 INJECTION, SOLUTION INTRAVENOUS ONCE
Status: CANCELLED | OUTPATIENT
Start: 2023-03-17

## 2023-03-09 RX ORDER — PALONOSETRON 0.05 MG/ML
0.25 INJECTION, SOLUTION INTRAVENOUS ONCE
Status: CANCELLED | OUTPATIENT
Start: 2023-03-17

## 2023-03-09 RX ORDER — FAMOTIDINE 10 MG/ML
20 INJECTION, SOLUTION INTRAVENOUS ONCE
Status: CANCELLED | OUTPATIENT
Start: 2023-03-17

## 2023-03-09 RX ORDER — DIPHENHYDRAMINE HYDROCHLORIDE 50 MG/ML
50 INJECTION INTRAMUSCULAR; INTRAVENOUS AS NEEDED
Status: CANCELLED | OUTPATIENT
Start: 2023-03-17

## 2023-03-09 RX ORDER — FAMOTIDINE 10 MG/ML
20 INJECTION, SOLUTION INTRAVENOUS AS NEEDED
Status: CANCELLED | OUTPATIENT
Start: 2023-03-17

## 2023-03-15 ENCOUNTER — LAB (OUTPATIENT)
Dept: LAB | Facility: HOSPITAL | Age: 58
End: 2023-03-15
Payer: COMMERCIAL

## 2023-03-15 ENCOUNTER — OFFICE VISIT (OUTPATIENT)
Dept: ONCOLOGY | Facility: CLINIC | Age: 58
End: 2023-03-15
Payer: COMMERCIAL

## 2023-03-15 VITALS
OXYGEN SATURATION: 94 % | WEIGHT: 173.4 LBS | DIASTOLIC BLOOD PRESSURE: 76 MMHG | HEIGHT: 67 IN | SYSTOLIC BLOOD PRESSURE: 128 MMHG | TEMPERATURE: 96.2 F | BODY MASS INDEX: 27.21 KG/M2 | HEART RATE: 96 BPM | RESPIRATION RATE: 18 BRPM

## 2023-03-15 DIAGNOSIS — C56.2 MALIGNANT NEOPLASM OF LEFT OVARY: ICD-10-CM

## 2023-03-15 DIAGNOSIS — R79.0 LOW MAGNESIUM LEVEL: Primary | ICD-10-CM

## 2023-03-15 DIAGNOSIS — C56.2 MALIGNANT NEOPLASM OF LEFT OVARY: Primary | ICD-10-CM

## 2023-03-15 LAB
ALBUMIN SERPL-MCNC: 4.4 G/DL (ref 3.5–5.2)
ALBUMIN/GLOB SERPL: 1.8 G/DL
ALP SERPL-CCNC: 82 U/L (ref 39–117)
ALT SERPL W P-5'-P-CCNC: 21 U/L (ref 1–33)
ANION GAP SERPL CALCULATED.3IONS-SCNC: 12 MMOL/L (ref 5–15)
AST SERPL-CCNC: 18 U/L (ref 1–32)
BASOPHILS # BLD AUTO: 0.02 10*3/MM3 (ref 0–0.2)
BASOPHILS NFR BLD AUTO: 0.3 % (ref 0–1.5)
BILIRUB SERPL-MCNC: 0.2 MG/DL (ref 0–1.2)
BUN SERPL-MCNC: 19 MG/DL (ref 6–20)
BUN/CREAT SERPL: 16.8 (ref 7–25)
CALCIUM SPEC-SCNC: 9.8 MG/DL (ref 8.6–10.5)
CHLORIDE SERPL-SCNC: 103 MMOL/L (ref 98–107)
CO2 SERPL-SCNC: 25 MMOL/L (ref 22–29)
CREAT SERPL-MCNC: 1.13 MG/DL (ref 0.57–1)
DEPRECATED RDW RBC AUTO: 57 FL (ref 37–54)
EGFRCR SERPLBLD CKD-EPI 2021: 56.2 ML/MIN/1.73
EOSINOPHIL # BLD AUTO: 0.01 10*3/MM3 (ref 0–0.4)
EOSINOPHIL NFR BLD AUTO: 0.1 % (ref 0.3–6.2)
ERYTHROCYTE [DISTWIDTH] IN BLOOD BY AUTOMATED COUNT: 17.8 % (ref 12.3–15.4)
GLOBULIN UR ELPH-MCNC: 2.5 GM/DL
GLUCOSE SERPL-MCNC: 83 MG/DL (ref 65–99)
HCT VFR BLD AUTO: 28.6 % (ref 34–46.6)
HGB BLD-MCNC: 9.4 G/DL (ref 12–15.9)
HOLD SPECIMEN: NORMAL
IMM GRANULOCYTES # BLD AUTO: 0.02 10*3/MM3 (ref 0–0.05)
IMM GRANULOCYTES NFR BLD AUTO: 0.3 % (ref 0–0.5)
LYMPHOCYTES # BLD AUTO: 1.97 10*3/MM3 (ref 0.7–3.1)
LYMPHOCYTES NFR BLD AUTO: 27.6 % (ref 19.6–45.3)
MAGNESIUM SERPL-MCNC: 1.9 MG/DL (ref 1.6–2.6)
MCH RBC QN AUTO: 30.4 PG (ref 26.6–33)
MCHC RBC AUTO-ENTMCNC: 32.9 G/DL (ref 31.5–35.7)
MCV RBC AUTO: 92.6 FL (ref 79–97)
MONOCYTES # BLD AUTO: 0.63 10*3/MM3 (ref 0.1–0.9)
MONOCYTES NFR BLD AUTO: 8.8 % (ref 5–12)
NEUTROPHILS NFR BLD AUTO: 4.48 10*3/MM3 (ref 1.7–7)
NEUTROPHILS NFR BLD AUTO: 62.9 % (ref 42.7–76)
NRBC BLD AUTO-RTO: 0 /100 WBC (ref 0–0.2)
PLATELET # BLD AUTO: 110 10*3/MM3 (ref 140–450)
PMV BLD AUTO: 9.4 FL (ref 6–12)
POTASSIUM SERPL-SCNC: 4.2 MMOL/L (ref 3.5–5.2)
PROT SERPL-MCNC: 6.9 G/DL (ref 6–8.5)
RBC # BLD AUTO: 3.09 10*6/MM3 (ref 3.77–5.28)
SODIUM SERPL-SCNC: 140 MMOL/L (ref 136–145)
WBC NRBC COR # BLD: 7.13 10*3/MM3 (ref 3.4–10.8)
WHOLE BLOOD HOLD SPECIMEN: NORMAL

## 2023-03-15 PROCEDURE — 36415 COLL VENOUS BLD VENIPUNCTURE: CPT

## 2023-03-15 PROCEDURE — 83735 ASSAY OF MAGNESIUM: CPT

## 2023-03-15 PROCEDURE — 80053 COMPREHEN METABOLIC PANEL: CPT

## 2023-03-15 PROCEDURE — 99214 OFFICE O/P EST MOD 30 MIN: CPT | Performed by: INTERNAL MEDICINE

## 2023-03-15 PROCEDURE — 85025 COMPLETE CBC W/AUTO DIFF WBC: CPT

## 2023-03-17 ENCOUNTER — INFUSION (OUTPATIENT)
Dept: ONCOLOGY | Facility: HOSPITAL | Age: 58
End: 2023-03-17
Payer: COMMERCIAL

## 2023-03-17 VITALS
OXYGEN SATURATION: 99 % | WEIGHT: 173.6 LBS | BODY MASS INDEX: 27.25 KG/M2 | DIASTOLIC BLOOD PRESSURE: 68 MMHG | HEIGHT: 67 IN | TEMPERATURE: 96.9 F | RESPIRATION RATE: 18 BRPM | HEART RATE: 92 BPM | SYSTOLIC BLOOD PRESSURE: 133 MMHG

## 2023-03-17 DIAGNOSIS — C56.2 MALIGNANT NEOPLASM OF LEFT OVARY: Primary | ICD-10-CM

## 2023-03-17 PROCEDURE — 25010000002 PACLITAXEL PER 1 MG: Performed by: INTERNAL MEDICINE

## 2023-03-17 PROCEDURE — 96375 TX/PRO/DX INJ NEW DRUG ADDON: CPT

## 2023-03-17 PROCEDURE — 25010000002 CARBOPLATIN PER 50 MG: Performed by: INTERNAL MEDICINE

## 2023-03-17 PROCEDURE — 25010000002 DIPHENHYDRAMINE PER 50 MG: Performed by: INTERNAL MEDICINE

## 2023-03-17 PROCEDURE — 25010000002 PALONOSETRON PER 25 MCG: Performed by: INTERNAL MEDICINE

## 2023-03-17 PROCEDURE — 96413 CHEMO IV INFUSION 1 HR: CPT

## 2023-03-17 PROCEDURE — 25010000002 HEPARIN LOCK FLUSH PER 10 UNITS: Performed by: INTERNAL MEDICINE

## 2023-03-17 PROCEDURE — 25010000002 PEGFILGRASTIM 6 MG/0.6ML PREFILLED SYRINGE KIT: Performed by: INTERNAL MEDICINE

## 2023-03-17 PROCEDURE — 96417 CHEMO IV INFUS EACH ADDL SEQ: CPT

## 2023-03-17 PROCEDURE — 96367 TX/PROPH/DG ADDL SEQ IV INF: CPT

## 2023-03-17 PROCEDURE — 96415 CHEMO IV INFUSION ADDL HR: CPT

## 2023-03-17 PROCEDURE — 25010000002 FOSAPREPITANT PER 1 MG: Performed by: INTERNAL MEDICINE

## 2023-03-17 PROCEDURE — 25010000002 DEXAMETHASONE SODIUM PHOSPHATE 100 MG/10ML SOLUTION: Performed by: INTERNAL MEDICINE

## 2023-03-17 PROCEDURE — 96377 APPLICATON ON-BODY INJECTOR: CPT

## 2023-03-17 RX ORDER — PALONOSETRON 0.05 MG/ML
0.25 INJECTION, SOLUTION INTRAVENOUS ONCE
Status: COMPLETED | OUTPATIENT
Start: 2023-03-17 | End: 2023-03-17

## 2023-03-17 RX ORDER — SODIUM CHLORIDE 0.9 % (FLUSH) 0.9 %
10 SYRINGE (ML) INJECTION AS NEEDED
Status: DISCONTINUED | OUTPATIENT
Start: 2023-03-17 | End: 2023-03-17 | Stop reason: HOSPADM

## 2023-03-17 RX ORDER — SODIUM CHLORIDE 9 MG/ML
250 INJECTION, SOLUTION INTRAVENOUS ONCE
Status: COMPLETED | OUTPATIENT
Start: 2023-03-17 | End: 2023-03-17

## 2023-03-17 RX ORDER — HEPARIN SODIUM (PORCINE) LOCK FLUSH IV SOLN 100 UNIT/ML 100 UNIT/ML
500 SOLUTION INTRAVENOUS AS NEEDED
Status: CANCELLED | OUTPATIENT
Start: 2023-03-17

## 2023-03-17 RX ORDER — FAMOTIDINE 10 MG/ML
20 INJECTION, SOLUTION INTRAVENOUS ONCE
Status: COMPLETED | OUTPATIENT
Start: 2023-03-17 | End: 2023-03-17

## 2023-03-17 RX ORDER — HEPARIN SODIUM (PORCINE) LOCK FLUSH IV SOLN 100 UNIT/ML 100 UNIT/ML
500 SOLUTION INTRAVENOUS AS NEEDED
Status: DISCONTINUED | OUTPATIENT
Start: 2023-03-17 | End: 2023-03-17 | Stop reason: HOSPADM

## 2023-03-17 RX ORDER — SODIUM CHLORIDE 0.9 % (FLUSH) 0.9 %
10 SYRINGE (ML) INJECTION AS NEEDED
Status: CANCELLED | OUTPATIENT
Start: 2023-03-17

## 2023-03-17 RX ADMIN — PACLITAXEL 335 MG: 6 INJECTION, SOLUTION INTRAVENOUS at 10:17

## 2023-03-17 RX ADMIN — DEXAMETHASONE SODIUM PHOSPHATE 12 MG: 10 INJECTION, SOLUTION INTRAMUSCULAR; INTRAVENOUS at 09:03

## 2023-03-17 RX ADMIN — HEPARIN SODIUM (PORCINE) LOCK FLUSH IV SOLN 100 UNIT/ML 500 UNITS: 100 SOLUTION at 14:03

## 2023-03-17 RX ADMIN — PALONOSETRON HYDROCHLORIDE 0.25 MG: 0.25 INJECTION, SOLUTION INTRAVENOUS at 09:05

## 2023-03-17 RX ADMIN — PEGFILGRASTIM 6 MG: KIT SUBCUTANEOUS at 13:57

## 2023-03-17 RX ADMIN — Medication 10 ML: at 14:02

## 2023-03-17 RX ADMIN — CARBOPLATIN 550 MG: 10 INJECTION, SOLUTION INTRAVENOUS at 13:16

## 2023-03-17 RX ADMIN — SODIUM CHLORIDE 150 MG: 9 INJECTION, SOLUTION INTRAVENOUS at 09:44

## 2023-03-17 RX ADMIN — FAMOTIDINE 20 MG: 10 INJECTION INTRAVENOUS at 09:29

## 2023-03-17 RX ADMIN — SODIUM CHLORIDE 250 ML: 9 INJECTION, SOLUTION INTRAVENOUS at 09:08

## 2023-03-17 RX ADMIN — DIPHENHYDRAMINE HYDROCHLORIDE 50 MG: 50 INJECTION, SOLUTION INTRAMUSCULAR; INTRAVENOUS at 09:24

## 2023-03-24 ENCOUNTER — TELEPHONE (OUTPATIENT)
Dept: ONCOLOGY | Facility: CLINIC | Age: 58
End: 2023-03-24
Payer: COMMERCIAL

## 2023-03-24 NOTE — PROGRESS NOTES
[facility] HEMATOLOGY & ONCOLOGY [address]  [facility] [id number] [phone]    Patient Name: Tiff Elizalde  Encounter Date: 04/07/2023  YOB: 1965  Patient Number: [id number]      REASON FOR FOLLOW-UP: Tiff Elizalde is a pleasant 57 y.o.  female who is seen on follow-up for stage III A1 ovarian cancer, left. She is seen C5D1 of adjuvant paclitaxel and carboplatin.  She had hypersensitivity reaction to paclitaxel on C1D1.  She is seen with her spouse, Dennis. History is obtained from patient.  History is considered accurate.        Oncology/Hematology History Overview Note   DIAGNOSTIC ABNORMALITIES:   Pain lower back and left leg swelling.  She presented to her PCP.   CT scan done at [facility]. Details not available.  Patient seen by Dr. Virgilio Hayward 11/23/2022. Patient found to have a large pelvic mass causing bilateral hydronephrosis with resultant nonfunctioning left kidney, elevated creatinine, left deep venous thrombosis and pulmonary embolus.  Pathology report 11/28/2022.  Fallopian tube and ovary, left salpingo-oophorectomy: Fallopian tube with no evidence of involvement by malignancy.  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  No surface involvement demonstrated.  Uterus, fallopian tube and ovary, hysterectomy with right salpingo-oophorectomy: Cervix and endocervix with no evidence of squamous or glandular dysplasia.  Chronic cervicitis present.  Weakly proliferative endometrium, negative for hyperplasia, carcinoma, and endometritis.  Benign endometrial polyp present.  Adenomyosis.  No leiomyomata.  Right fallopian tube with focal involvement by serous neoplasm with Samona by this most consistent with low-grade serous carcinoma.  Remnant left ovary with adhesions to uterine serosa and focal involvement by low-grade serous carcinoma.  Right ovary with  surface involvement by low-grade serous carcinoma.  Omentum biopsy: Involved by low-grade serous carcinoma, invasive).  Sidewall, left pelvic biopsy: Edema with hemorrhage and chronic inflammation, negative for endometriosis and malignancy.  Lymph node left pelvic biopsy: 1 lymph node with a 0.9 mm focus of metastatic serous carcinoma surrounding adipose tissue with hemorrhage and edema.           PREVIOUS INTERVENTIONS:  IVC filter was placed 11/22/2022 at [place].  She had undergone exploratory laparotomy, total abdominal hysterectomy, bilateral salpingo-oophorectomy, debulking, left pelvic lymph node dissection and omentectomy by Dr. Hayward on 11/23/2022.  Estimated blood loss 400 ml.  Laparotomy showed a massive mass arising from the left ovary, retroperitoneal, infiltrating the retroperitoneal space and adherent to the sigmoid mesentery.  Within the retroperitoneum it was densely adherent to the iliac vessel on the left side and ureter.  Deliberate rupture of the mass was required in order to access the retroperitoneal attachment.  The mass was sent for frozen section and determined to be at least borderline tumor possibly low-grade serous tumor.  There was no other evidence of disease in the pelvis or upper abdomen.  Due to being densely adherent to the retroperitoneum and iliac vessels, it is unclear whether the entire tumor was removed or not and the pelvic sidewall biopsy was taken to determine whether the residual ovary was present and not a fibrotic reaction.  Bilateral ureterolysis was required.   Hypersensitivity reaction to Taxol 01/09/2023.   Adjuvant paclitaxel and carboplatin 01/10/2023 through present.      Ovarian cancer   12/15/2022 Initial Diagnosis    Ovarian cancer (HCC)     1/9/2023 -  Chemotherapy    OP OVARIAN PACLitaxel / CARBOplatin (Q21D)     1/9/2023 -  Chemotherapy    OP CENTRAL VENOUS ACCESS DEVICE ACCESS, CARE, AND MAINTENANCE (CVAD)     1/20/2023 Cancer Staged    Staging form:  Ovary, Fallopian Tube, And Primary Peritoneal Carcinoma, AJCC 8th Edition  - Pathologic stage from 1/20/2023: FIGO Stage III (pT3, pN1, cM0) - Signed by Walker Magana MD on 1/20/2023     2/15/2023 - 2/15/2023 Chemotherapy    OP MAGNESIUM          PAST MEDICAL HISTORY:  ALLERGIES:  No Known Allergies  CURRENT MEDICATIONS:  Outpatient Encounter Medications as of 4/7/2023   Medication Sig Dispense Refill   • Calcium Carbonate-Vit D-Min (CALCIUM 1200 PO) Take  by mouth.     • dexamethasone (DECADRON) 4 MG tablet Take 1 pill night before chemo and morning of chemo. 10 tablet 2   • Eliquis 5 MG tablet tablet 1 tablet Every 12 (Twelve) Hours.     • ferrous sulfate 325 (65 FE) MG tablet Take 1 tablet by mouth Daily With Breakfast. 60 tablet 2   • HYDROcodone-acetaminophen (NORCO) 7.5-325 MG per tablet Take 1 tablet by mouth Every 4 (Four) Hours As Needed for Moderate Pain (Pain). 15 tablet 0   • lidocaine-prilocaine (EMLA) 2.5-2.5 % cream Apply to port site 30 minutes prior to being accessed. 30 g 0   • lisinopril-hydrochlorothiazide (PRINZIDE,ZESTORETIC) 20-12.5 MG per tablet Take 1 tablet by mouth Daily.     • ondansetron (Zofran) 8 MG tablet Take 1 tablet by mouth Every 8 (Eight) Hours As Needed for Nausea or Vomiting. 60 tablet 2   • prochlorperazine (COMPAZINE) 10 MG tablet Take 1 tablet by mouth Every 6 (Six) Hours As Needed for Nausea. 60 tablet 2   • vitamin D3 125 MCG (5000 UT) capsule capsule Take  by mouth Daily.     • magnesium oxide (MAG-OX) 400 MG tablet Take 1 tablet by mouth 2 (Two) Times a Day. (Patient not taking: Reported on 4/7/2023) 6 tablet 0     No facility-administered encounter medications on file as of 4/7/2023.     ADULT ILLNESSES:  Patient Active Problem List   Diagnosis Code   • Ovarian cancer C56.9     SURGERIES:  Past Surgical History:   Procedure Laterality Date   • VENA CAVA FILTER INSERTION     • VENOUS ACCESS DEVICE (PORT) INSERTION N/A 12/30/2022    Procedure: INSERTION VENOUS ACCESS  "DEVICE;  Surgeon: Holley Mcgill MD;  Location: [facility] OR;  Service: General;  Laterality: N/A;     HEALTH MAINTENANCE ITEMS:  Health Maintenance Due   Topic Date Due   • MAMMOGRAM  Never done   • COLORECTAL CANCER SCREENING  Never done   • COVID-19 Vaccine (1) Never done   • TDAP/TD VACCINES (2 - Tdap) 02/24/2008   • ZOSTER VACCINE (1 of 2) Never done   • HEPATITIS C SCREENING  Never done   • ANNUAL PHYSICAL  Never done   • PAP SMEAR  Never done       <no information>  Last Completed Colonoscopy     This patient has no relevant Health Maintenance data.          There is no immunization history on file for this patient.  Last Completed Mammogram     This patient has no relevant Health Maintenance data.            FAMILY HISTORY:  No family history on file.  SOCIAL HISTORY:  Social History     Socioeconomic History   • Marital status:    Tobacco Use   • Smoking status: Never   Vaping Use   • Vaping Use: Never used   Substance and Sexual Activity   • Alcohol use: Never   • Drug use: Never   • Sexual activity: Defer       REVIEW OF SYSTEMS:    Review of Systems   Constitutional: Negative for fatigue, fever and unexpected weight change.        \"I feel great today.\"   HENT: Negative for congestion, mouth sores and trouble swallowing.    Eyes: Negative for redness and visual disturbance.   Respiratory: Negative for cough, shortness of breath and wheezing.    Cardiovascular: Negative for chest pain and leg swelling.   Gastrointestinal: Negative for abdominal pain, nausea and vomiting.   Endocrine: Negative for polydipsia and polyphagia.   Genitourinary: Negative for difficulty urinating, dysuria and flank pain.   Musculoskeletal: Negative for gait problem and joint swelling.   Skin: Positive for pallor.   Allergic/Immunologic: Negative for food allergies.   Neurological: Negative for dizziness, speech difficulty and weakness.   Hematological: Negative for adenopathy. Does not bruise/bleed easily. " "  Psychiatric/Behavioral: Negative for agitation, confusion and hallucinations.       VITAL SIGNS: /72   Pulse 111   Temp 97.3 °F (36.3 °C)   Resp 18   Ht 170.2 cm (67.01\")   Wt 80.3 kg (177 lb)   SpO2 98%   Breastfeeding No   BMI 27.71 kg/m²  Gained 4 pounds.   Pain Score    04/07/23 0906   PainSc: 0-No pain       PHYSICAL EXAMINATION:     Physical Exam  Vitals reviewed.   Constitutional:       General: She is not in acute distress.  HENT:      Head: Normocephalic and atraumatic.   Eyes:      General: No scleral icterus.  Cardiovascular:      Rate and Rhythm: Normal rate.   Pulmonary:      Effort: No respiratory distress.      Breath sounds: No wheezing or rales.      Comments: Port, left. No erythema.  Abdominal:      General: Bowel sounds are normal.      Palpations: Abdomen is soft.      Tenderness: There is no abdominal tenderness.   Musculoskeletal:         General: No swelling.      Cervical back: Neck supple.   Skin:     General: Skin is warm.      Coloration: Skin is pale.   Neurological:      Mental Status: She is alert and oriented to person, place, and time.   Psychiatric:         Mood and Affect: Mood normal.         Behavior: Behavior normal.         Thought Content: Thought content normal.         Judgment: Judgment normal.         LABS    Lab Results - Last 18 Months   Lab Units 04/07/23  0831 03/15/23  1258 02/24/23  0757 02/03/23  0815 01/09/23  0845 12/28/22  1337 12/15/22  1419   HEMOGLOBIN g/dL 9.1* 9.4* 10.1* 11.5* 11.8* 11.3* 11.4*   HEMATOCRIT % 27.4* 28.6* 30.9* 36.2 37.6 34.7 36.7   MCV fL 96.8 92.6 89.3 89.4 89.7 88.1 90.6   WBC 10*3/mm3 6.85 7.13 9.62 8.17 8.55 8.87 9.66   RDW % 19.3* 17.8* 16.0* 14.5 13.2 13.3 13.2   MPV fL 9.4 9.4 10.1 9.3 9.9 9.6 10.2   PLATELETS 10*3/mm3 104* 110* 140 330 282 308 313   IMM GRAN % % 0.4 0.3 0.3 0.4 0.4  --  0.4   NEUTROS ABS 10*3/mm3 5.79 4.48 8.21* 7.12* 6.02  --  6.37   LYMPHS ABS 10*3/mm3 0.70 1.97 0.88 0.72 1.68  --  2.22   MONOS ABS " 10*3/mm3 0.32 0.63 0.49 0.29 0.68  --  0.79   EOS ABS 10*3/mm3 0.00 0.01 0.00 0.00 0.09  --  0.18   BASOS ABS 10*3/mm3 0.01 0.02 0.01 0.01 0.05  --  0.06   IMMATURE GRANS (ABS) 10*3/mm3 0.03 0.02 0.03 0.03 0.03  --  0.04   NRBC /100 WBC 0.0 0.0 0.0 0.0 0.0  --  0.0       Lab Results - Last 18 Months   Lab Units 04/07/23  0831 03/15/23  1257 02/24/23  0757 02/03/23  0815 01/09/23  0845 12/28/22  1337   GLUCOSE mg/dL 112* 83 140* 130* 90 97   SODIUM mmol/L 137 140 138 138 142 144   POTASSIUM mmol/L 4.2 4.2 4.3 4.6 4.1 3.8   CO2 mmol/L 24.0 25.0 23.0 25.0 29.0 30.0*   CHLORIDE mmol/L 101 103 103 104 105 104   ANION GAP mmol/L 12.0 12.0 12.0 9.0 8.0 10.0   CREATININE mg/dL 1.15* 1.13* 1.06* 1.08* 1.37* 1.29*   BUN mg/dL 25* 19 24* 23* 17 17   BUN / CREAT RATIO  21.7 16.8 22.6 21.3 12.4 13.2   CALCIUM mg/dL 10.0 9.8 10.1 9.9 9.9 9.8   ALK PHOS U/L 88 82 86 80 71 68   TOTAL PROTEIN g/dL 7.2 6.9 7.1 7.7 7.4 7.4   ALT (SGPT) U/L 21 21 22 13 10 14   AST (SGOT) U/L 18 18 18 13 14 18   BILIRUBIN mg/dL 0.2 0.2 0.3 0.2 0.4 0.2   ALBUMIN g/dL 4.3 4.4 4.4 4.6 4.3 4.2   GLOBULIN gm/dL 2.9 2.5 2.7 3.1 3.1 3.2       No results for input(s): MSPIKE, KAPPALAMB, IGLFLC, URICACID, FREEKAPPAL, CEA, LDH, REFLABREPO in the last 09572 hours.    Lab Results - Last 18 Months   Lab Units 01/09/23  0845   IRON mcg/dL 35*   TIBC mcg/dL 308   IRON SATURATION % 11*   FERRITIN ng/mL 379.80*       Tiff Elizalde reports a pain score of 0.            ASSESSMENT:  1.  Ovarian cancer, low-grade serous carcinoma, left.  Tumor size 18 cm  AJCC stage:IIIA1 (pT3, pN1, cM0, G1)  Treatment status:Recveiving adjuvant Taxol and carboplatin 01/10/2023 through present.  2.  Performance status of 0.  3.  Left deep venous thrombosis from malignancy. Receiving apixaban.   4.  Pulmonary embolism from malignancy. Receiving apixaban.  Post IVC filter 11/22/2022.  5.  Anemia from iron deficiency and chronic kidney disease stage IIIa, GFR 59.3 ml/min on 2/3/2023. On oral  "iron 01/10/2023 through present.    6.  Hypersensitivity reaction to paclitaxel on 01/09/2023. Taking Decadron premed.   7.  Grade 1 neuropathy. Under observation. Gabapentin on hold.           PLAN:  1.   Re: Tolerance to chemo \"I was just wore out. Not sick.\"  2.   Re: Tolerance to oral iron.  \"Fine. No problems.\"  3.   Re: Heme status.   WBC 6.8, hemoglobin 9.1, hematocrit 27.4, MCV 96.8 and platelet 104.  4.   Re: CMP.  GFR 55 from 56.2 mL per minute   5.   Re: Magnesium 1.9.  6.   Re: Anti histamine prior to Neulasta.  \"It hits me Sunday afternoon through Monday.\"   7.  Continue adjuvant chemo.  Monitor for anaphylaxis, recurrent infusion reactions, nausea, vomiting, alopecia, myelosuppression and neuropathy.  8.  Schedule chemo C5D1 on 4/7/2023 and every 21 days.  Plan for 6 cycles.    Carboplatin AUC = 6.  Taxol 175 mg/m2 over 3 hours.   9.  Premedicate with:  Aloxi 0.25 mg IV  Emend 150 mg IV  Decadron 12 mg IV  Pepcid 20 mg IV  Benadryl 50 mg IV  10. Neulasta Onpro 6 mg D1 of chemo as primary prophylaxis. High risk for febrile neutropenia.    11.  Weekly CBC with differential, CMP, and magnesium.  12.  eRx for Zofran 8 mg p.o. every 8 hours as needed for nausea and vomiting #60, 2 refills if needed.  13.  eRx for Compazine 10 mg p.o. every 4 hours as needed for nausea and vomiting #60, 2 refills if needed.  14.  eRx Decadron 4 mg po start night before chemo and on the day of chemo, 10, 2 refills.  15.  eRx gabapentin 300 mg po three times daily, 90, 2 refills if needed. Observe for  dizziness, lightheadedness, or somnolence.  16.  Continue ongoing management per primary care physician and other specialists.  17.  Plan of care discussed with patient and spouse Dennis.  Understanding expressed.  Patient agreeable to proceed.  18.  Questions were answered to her satisfaction. \"Yea.\"  19.  Order 1 liter NS over 2 hours as needed at [facility].   20.  Obtain CT " scans from [facility].  21.  Return to office 4/26/2023. Laboratory 4/28/2023.         I have reviewed the assessment and plan and verified the accuracy of it. No changes to assessment and plan since the information was documented. Walker Magana MD 04/07/23         I spent 32 total minutes, face-to-face, caring for Tiff today.  Greater than 50% of this time involved counseling and/or coordination of care as documented within this note regarding the patient's illness(es), pros and cons of various treatment options, instructions and/or risk reduction.                 (Holley Mcgill MD)  MD Tyrone Galindo MD

## 2023-03-24 NOTE — TELEPHONE ENCOUNTER
----- Message from Walker Magana MD sent at 3/24/2023 11:30 AM CDT -----  Order 2 g IV magnesium.  Magnesium 1.5.

## 2023-03-24 NOTE — TELEPHONE ENCOUNTER
Contacted patient Tiff Elizalde regarding her low Mag reading of 1.5  She was informed Dr Magana did want to marcos a magnesium run. Patient asks if she can retake the oral mag bid over the weekend and is if this will bring her level up, she will recheck her magnesium level again on Monday 3/27/23 @ her local Hospital and if this does not help bring her level back up into normal range she will then marcos a apt [facility] Magnesium [facility].   Patient has oral mag on hand will take 2 tabs daily starting today 3/24/23 she is aware that if she has any issues or side effects such as diarrhea she will stop the tablets and contact the office.

## 2023-03-30 RX ORDER — FAMOTIDINE 10 MG/ML
20 INJECTION, SOLUTION INTRAVENOUS ONCE
Status: CANCELLED | OUTPATIENT
Start: 2023-04-07

## 2023-03-30 RX ORDER — DIPHENHYDRAMINE HYDROCHLORIDE 50 MG/ML
50 INJECTION INTRAMUSCULAR; INTRAVENOUS AS NEEDED
Status: CANCELLED | OUTPATIENT
Start: 2023-04-07

## 2023-03-30 RX ORDER — PALONOSETRON 0.05 MG/ML
0.25 INJECTION, SOLUTION INTRAVENOUS ONCE
Status: CANCELLED | OUTPATIENT
Start: 2023-04-07

## 2023-03-30 RX ORDER — SODIUM CHLORIDE 9 MG/ML
250 INJECTION, SOLUTION INTRAVENOUS ONCE
Status: CANCELLED | OUTPATIENT
Start: 2023-04-07

## 2023-03-30 RX ORDER — FAMOTIDINE 10 MG/ML
20 INJECTION, SOLUTION INTRAVENOUS AS NEEDED
Status: CANCELLED | OUTPATIENT
Start: 2023-04-07

## 2023-04-07 ENCOUNTER — INFUSION (OUTPATIENT)
Dept: ONCOLOGY | Facility: HOSPITAL | Age: 58
End: 2023-04-07
Payer: COMMERCIAL

## 2023-04-07 ENCOUNTER — LAB (OUTPATIENT)
Dept: LAB | Facility: HOSPITAL | Age: 58
End: 2023-04-07
Payer: COMMERCIAL

## 2023-04-07 ENCOUNTER — OFFICE VISIT (OUTPATIENT)
Dept: ONCOLOGY | Facility: CLINIC | Age: 58
End: 2023-04-07
Payer: COMMERCIAL

## 2023-04-07 VITALS
WEIGHT: 177 LBS | DIASTOLIC BLOOD PRESSURE: 72 MMHG | SYSTOLIC BLOOD PRESSURE: 132 MMHG | HEIGHT: 67 IN | RESPIRATION RATE: 18 BRPM | BODY MASS INDEX: 27.78 KG/M2 | TEMPERATURE: 97.3 F | HEART RATE: 111 BPM | OXYGEN SATURATION: 98 %

## 2023-04-07 VITALS
BODY MASS INDEX: 27.88 KG/M2 | WEIGHT: 177.6 LBS | OXYGEN SATURATION: 98 % | HEIGHT: 67 IN | DIASTOLIC BLOOD PRESSURE: 64 MMHG | RESPIRATION RATE: 18 BRPM | TEMPERATURE: 97.9 F | SYSTOLIC BLOOD PRESSURE: 128 MMHG | HEART RATE: 101 BPM

## 2023-04-07 DIAGNOSIS — C56.2 MALIGNANT NEOPLASM OF LEFT OVARY: Primary | ICD-10-CM

## 2023-04-07 DIAGNOSIS — R79.0 LOW MAGNESIUM LEVEL: ICD-10-CM

## 2023-04-07 LAB
ALBUMIN SERPL-MCNC: 4.3 G/DL (ref 3.5–5.2)
ALBUMIN/GLOB SERPL: 1.5 G/DL
ALP SERPL-CCNC: 88 U/L (ref 39–117)
ALT SERPL W P-5'-P-CCNC: 21 U/L (ref 1–33)
ANION GAP SERPL CALCULATED.3IONS-SCNC: 12 MMOL/L (ref 5–15)
AST SERPL-CCNC: 18 U/L (ref 1–32)
BASOPHILS # BLD AUTO: 0.01 10*3/MM3 (ref 0–0.2)
BASOPHILS NFR BLD AUTO: 0.1 % (ref 0–1.5)
BILIRUB SERPL-MCNC: 0.2 MG/DL (ref 0–1.2)
BUN SERPL-MCNC: 25 MG/DL (ref 6–20)
BUN/CREAT SERPL: 21.7 (ref 7–25)
CALCIUM SPEC-SCNC: 10 MG/DL (ref 8.6–10.5)
CHLORIDE SERPL-SCNC: 101 MMOL/L (ref 98–107)
CO2 SERPL-SCNC: 24 MMOL/L (ref 22–29)
CREAT SERPL-MCNC: 1.15 MG/DL (ref 0.57–1)
DEPRECATED RDW RBC AUTO: 67.6 FL (ref 37–54)
EGFRCR SERPLBLD CKD-EPI 2021: 55 ML/MIN/1.73
EOSINOPHIL # BLD AUTO: 0 10*3/MM3 (ref 0–0.4)
EOSINOPHIL NFR BLD AUTO: 0 % (ref 0.3–6.2)
ERYTHROCYTE [DISTWIDTH] IN BLOOD BY AUTOMATED COUNT: 19.3 % (ref 12.3–15.4)
GLOBULIN UR ELPH-MCNC: 2.9 GM/DL
GLUCOSE SERPL-MCNC: 112 MG/DL (ref 65–99)
HCT VFR BLD AUTO: 27.4 % (ref 34–46.6)
HGB BLD-MCNC: 9.1 G/DL (ref 12–15.9)
HOLD SPECIMEN: NORMAL
IMM GRANULOCYTES # BLD AUTO: 0.03 10*3/MM3 (ref 0–0.05)
IMM GRANULOCYTES NFR BLD AUTO: 0.4 % (ref 0–0.5)
LYMPHOCYTES # BLD AUTO: 0.7 10*3/MM3 (ref 0.7–3.1)
LYMPHOCYTES NFR BLD AUTO: 10.2 % (ref 19.6–45.3)
MAGNESIUM SERPL-MCNC: 1.9 MG/DL (ref 1.6–2.6)
MCH RBC QN AUTO: 32.2 PG (ref 26.6–33)
MCHC RBC AUTO-ENTMCNC: 33.2 G/DL (ref 31.5–35.7)
MCV RBC AUTO: 96.8 FL (ref 79–97)
MONOCYTES # BLD AUTO: 0.32 10*3/MM3 (ref 0.1–0.9)
MONOCYTES NFR BLD AUTO: 4.7 % (ref 5–12)
NEUTROPHILS NFR BLD AUTO: 5.79 10*3/MM3 (ref 1.7–7)
NEUTROPHILS NFR BLD AUTO: 84.6 % (ref 42.7–76)
NRBC BLD AUTO-RTO: 0 /100 WBC (ref 0–0.2)
PLATELET # BLD AUTO: 104 10*3/MM3 (ref 140–450)
PMV BLD AUTO: 9.4 FL (ref 6–12)
POTASSIUM SERPL-SCNC: 4.2 MMOL/L (ref 3.5–5.2)
PROT SERPL-MCNC: 7.2 G/DL (ref 6–8.5)
RBC # BLD AUTO: 2.83 10*6/MM3 (ref 3.77–5.28)
SODIUM SERPL-SCNC: 137 MMOL/L (ref 136–145)
WBC NRBC COR # BLD: 6.85 10*3/MM3 (ref 3.4–10.8)

## 2023-04-07 PROCEDURE — 83735 ASSAY OF MAGNESIUM: CPT

## 2023-04-07 PROCEDURE — 25010000002 HEPARIN LOCK FLUSH PER 10 UNITS: Performed by: INTERNAL MEDICINE

## 2023-04-07 PROCEDURE — 96375 TX/PRO/DX INJ NEW DRUG ADDON: CPT

## 2023-04-07 PROCEDURE — 96413 CHEMO IV INFUSION 1 HR: CPT

## 2023-04-07 PROCEDURE — 96415 CHEMO IV INFUSION ADDL HR: CPT

## 2023-04-07 PROCEDURE — 36415 COLL VENOUS BLD VENIPUNCTURE: CPT

## 2023-04-07 PROCEDURE — 25010000002 DIPHENHYDRAMINE PER 50 MG: Performed by: INTERNAL MEDICINE

## 2023-04-07 PROCEDURE — 25010000002 PACLITAXEL PER 1 MG: Performed by: INTERNAL MEDICINE

## 2023-04-07 PROCEDURE — 25010000002 CARBOPLATIN PER 50 MG: Performed by: INTERNAL MEDICINE

## 2023-04-07 PROCEDURE — 80053 COMPREHEN METABOLIC PANEL: CPT

## 2023-04-07 PROCEDURE — 25010000002 FOSAPREPITANT PER 1 MG: Performed by: INTERNAL MEDICINE

## 2023-04-07 PROCEDURE — 96377 APPLICATON ON-BODY INJECTOR: CPT

## 2023-04-07 PROCEDURE — 25010000002 DEXAMETHASONE SODIUM PHOSPHATE 100 MG/10ML SOLUTION: Performed by: INTERNAL MEDICINE

## 2023-04-07 PROCEDURE — 85025 COMPLETE CBC W/AUTO DIFF WBC: CPT

## 2023-04-07 PROCEDURE — 96367 TX/PROPH/DG ADDL SEQ IV INF: CPT

## 2023-04-07 PROCEDURE — 99214 OFFICE O/P EST MOD 30 MIN: CPT | Performed by: INTERNAL MEDICINE

## 2023-04-07 PROCEDURE — 25010000002 PALONOSETRON PER 25 MCG: Performed by: INTERNAL MEDICINE

## 2023-04-07 PROCEDURE — 96417 CHEMO IV INFUS EACH ADDL SEQ: CPT

## 2023-04-07 PROCEDURE — 25010000002 PEGFILGRASTIM 6 MG/0.6ML PREFILLED SYRINGE KIT: Performed by: INTERNAL MEDICINE

## 2023-04-07 RX ORDER — PALONOSETRON 0.05 MG/ML
0.25 INJECTION, SOLUTION INTRAVENOUS ONCE
Status: COMPLETED | OUTPATIENT
Start: 2023-04-07 | End: 2023-04-07

## 2023-04-07 RX ORDER — FAMOTIDINE 10 MG/ML
20 INJECTION, SOLUTION INTRAVENOUS AS NEEDED
Status: DISCONTINUED | OUTPATIENT
Start: 2023-04-07 | End: 2023-04-07 | Stop reason: HOSPADM

## 2023-04-07 RX ORDER — SODIUM CHLORIDE 9 MG/ML
250 INJECTION, SOLUTION INTRAVENOUS ONCE
Status: COMPLETED | OUTPATIENT
Start: 2023-04-07 | End: 2023-04-07

## 2023-04-07 RX ORDER — SODIUM CHLORIDE 0.9 % (FLUSH) 0.9 %
10 SYRINGE (ML) INJECTION AS NEEDED
OUTPATIENT
Start: 2023-04-07

## 2023-04-07 RX ORDER — HEPARIN SODIUM (PORCINE) LOCK FLUSH IV SOLN 100 UNIT/ML 100 UNIT/ML
500 SOLUTION INTRAVENOUS AS NEEDED
OUTPATIENT
Start: 2023-04-07

## 2023-04-07 RX ORDER — SODIUM CHLORIDE 0.9 % (FLUSH) 0.9 %
10 SYRINGE (ML) INJECTION AS NEEDED
Status: DISCONTINUED | OUTPATIENT
Start: 2023-04-07 | End: 2023-04-07 | Stop reason: HOSPADM

## 2023-04-07 RX ORDER — FAMOTIDINE 10 MG/ML
20 INJECTION, SOLUTION INTRAVENOUS ONCE
Status: COMPLETED | OUTPATIENT
Start: 2023-04-07 | End: 2023-04-07

## 2023-04-07 RX ORDER — DIPHENHYDRAMINE HYDROCHLORIDE 50 MG/ML
50 INJECTION INTRAMUSCULAR; INTRAVENOUS AS NEEDED
Status: DISCONTINUED | OUTPATIENT
Start: 2023-04-07 | End: 2023-04-07 | Stop reason: HOSPADM

## 2023-04-07 RX ORDER — HEPARIN SODIUM (PORCINE) LOCK FLUSH IV SOLN 100 UNIT/ML 100 UNIT/ML
500 SOLUTION INTRAVENOUS AS NEEDED
Status: DISCONTINUED | OUTPATIENT
Start: 2023-04-07 | End: 2023-04-07 | Stop reason: HOSPADM

## 2023-04-07 RX ADMIN — DIPHENHYDRAMINE HYDROCHLORIDE 50 MG: 50 INJECTION, SOLUTION INTRAMUSCULAR; INTRAVENOUS at 10:51

## 2023-04-07 RX ADMIN — FOSAPREPITANT 150 MG: 150 INJECTION, POWDER, LYOPHILIZED, FOR SOLUTION INTRAVENOUS at 11:12

## 2023-04-07 RX ADMIN — SODIUM CHLORIDE 250 ML: 9 INJECTION, SOLUTION INTRAVENOUS at 10:12

## 2023-04-07 RX ADMIN — PALONOSETRON HYDROCHLORIDE 0.25 MG: 0.25 INJECTION, SOLUTION INTRAVENOUS at 10:24

## 2023-04-07 RX ADMIN — Medication 10 ML: at 15:24

## 2023-04-07 RX ADMIN — PEGFILGRASTIM 6 MG: KIT SUBCUTANEOUS at 15:21

## 2023-04-07 RX ADMIN — CARBOPLATIN 550 MG: 10 INJECTION, SOLUTION INTRAVENOUS at 14:37

## 2023-04-07 RX ADMIN — HEPARIN 500 UNITS: 100 SYRINGE at 15:24

## 2023-04-07 RX ADMIN — DEXAMETHASONE SODIUM PHOSPHATE 12 MG: 10 INJECTION, SOLUTION INTRAMUSCULAR; INTRAVENOUS at 10:28

## 2023-04-07 RX ADMIN — FAMOTIDINE 20 MG: 10 INJECTION INTRAVENOUS at 10:26

## 2023-04-07 RX ADMIN — PACLITAXEL 335 MG: 6 INJECTION, SOLUTION INTRAVENOUS at 11:46

## 2023-04-07 NOTE — LETTER
April 7, 2023       No Recipients    Patient: Tiff Elizalde   YOB: 1965   Date of Visit: 4/7/2023       Dear Dr. Haines Recipients:    Thank you for referring Tiff Elizalde to me for evaluation. Below are the relevant portions of my assessment and plan of care.    If you have questions, please do not hesitate to call me. I look forward to following Tiff along with you.         Sincerely,        Walker Magana MD        CC:   No Recipients    Walker Magana MD  04/07/23 0936  Signed  [facility] HEMATOLOGY & ONCOLOGY [address]  [facility] [address]  [phone]    Patient Name: Tiff Elizalde  Encounter Date: 04/07/2023  YOB: 1965  Patient Number: [id number]      REASON FOR FOLLOW-UP: Tiff Elizalde is a pleasant 57 y.o.  female who is seen on follow-up for stage III A1 ovarian cancer, left. She is seen C5D1 of adjuvant paclitaxel and carboplatin.  She had hypersensitivity reaction to paclitaxel on C1D1.  She is seen with her spouse, Dennis. History is obtained from patient.  History is considered accurate.        Oncology/Hematology History Overview Note   DIAGNOSTIC ABNORMALITIES:   Pain lower back and left leg swelling.  She presented to her PCP.   CT scan done at [facility]. Details not available.  Patient seen by Dr. Virgilio Hayward 11/23/2022. Patient found to have a large pelvic mass causing bilateral hydronephrosis with resultant nonfunctioning left kidney, elevated creatinine, left deep venous thrombosis and pulmonary embolus.  Pathology report 11/28/2022.  Fallopian tube and ovary, left salpingo-oophorectomy: Fallopian tube with no evidence of involvement by malignancy.  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  No surface involvement demonstrated.  Uterus, fallopian tube and ovary, hysterectomy with right salpingo-oophorectomy: Cervix and endocervix  with no evidence of squamous or glandular dysplasia.  Chronic cervicitis present.  Weakly proliferative endometrium, negative for hyperplasia, carcinoma, and endometritis.  Benign endometrial polyp present.  Adenomyosis.  No leiomyomata.  Right fallopian tube with focal involvement by serous neoplasm with Samona by this most consistent with low-grade serous carcinoma.  Remnant left ovary with adhesions to uterine serosa and focal involvement by low-grade serous carcinoma.  Right ovary with surface involvement by low-grade serous carcinoma.  Omentum biopsy: Involved by low-grade serous carcinoma, invasive).  Sidewall, left pelvic biopsy: Edema with hemorrhage and chronic inflammation, negative for endometriosis and malignancy.  Lymph node left pelvic biopsy: 1 lymph node with a 0.9 mm focus of metastatic serous carcinoma surrounding adipose tissue with hemorrhage and edema.           PREVIOUS INTERVENTIONS:  IVC filter was placed 11/22/2022 at [place].  She had undergone exploratory laparotomy, total abdominal hysterectomy, bilateral salpingo-oophorectomy, debulking, left pelvic lymph node dissection and omentectomy by Dr. Hayward on 11/23/2022.  Estimated blood loss 400 ml.  Laparotomy showed a massive mass arising from the left ovary, retroperitoneal, infiltrating the retroperitoneal space and adherent to the sigmoid mesentery.  Within the retroperitoneum it was densely adherent to the iliac vessel on the left side and ureter.  Deliberate rupture of the mass was required in order to access the retroperitoneal attachment.  The mass was sent for frozen section and determined to be at least borderline tumor possibly low-grade serous tumor.  There was no other evidence of disease in the pelvis or upper abdomen.  Due to being densely adherent to the retroperitoneum and iliac vessels, it is unclear whether the entire tumor was removed or not and the pelvic sidewall biopsy was taken to determine whether the residual  ovary was present and not a fibrotic reaction.  Bilateral ureterolysis was required.   Hypersensitivity reaction to Taxol 01/09/2023.   Adjuvant paclitaxel and carboplatin 01/10/2023 through present.      Ovarian cancer   12/15/2022 Initial Diagnosis    Ovarian cancer (HCC)     1/9/2023 -  Chemotherapy    OP OVARIAN PACLitaxel / CARBOplatin (Q21D)     1/9/2023 -  Chemotherapy    OP CENTRAL VENOUS ACCESS DEVICE ACCESS, CARE, AND MAINTENANCE (CVAD)     1/20/2023 Cancer Staged    Staging form: Ovary, Fallopian Tube, And Primary Peritoneal Carcinoma, AJCC 8th Edition  - Pathologic stage from 1/20/2023: FIGO Stage III (pT3, pN1, cM0) - Signed by Walker Magana MD on 1/20/2023     2/15/2023 - 2/15/2023 Chemotherapy    OP MAGNESIUM          PAST MEDICAL HISTORY:  ALLERGIES:  No Known Allergies  CURRENT MEDICATIONS:  Outpatient Encounter Medications as of 4/7/2023   Medication Sig Dispense Refill   • Calcium Carbonate-Vit D-Min (CALCIUM 1200 PO) Take  by mouth.     • dexamethasone (DECADRON) 4 MG tablet Take 1 pill night before chemo and morning of chemo. 10 tablet 2   • Eliquis 5 MG tablet tablet 1 tablet Every 12 (Twelve) Hours.     • ferrous sulfate 325 (65 FE) MG tablet Take 1 tablet by mouth Daily With Breakfast. 60 tablet 2   • HYDROcodone-acetaminophen (NORCO) 7.5-325 MG per tablet Take 1 tablet by mouth Every 4 (Four) Hours As Needed for Moderate Pain (Pain). 15 tablet 0   • lidocaine-prilocaine (EMLA) 2.5-2.5 % cream Apply to port site 30 minutes prior to being accessed. 30 g 0   • lisinopril-hydrochlorothiazide (PRINZIDE,ZESTORETIC) 20-12.5 MG per tablet Take 1 tablet by mouth Daily.     • ondansetron (Zofran) 8 MG tablet Take 1 tablet by mouth Every 8 (Eight) Hours As Needed for Nausea or Vomiting. 60 tablet 2   • prochlorperazine (COMPAZINE) 10 MG tablet Take 1 tablet by mouth Every 6 (Six) Hours As Needed for Nausea. 60 tablet 2   • vitamin D3 125 MCG (5000 UT) capsule capsule Take  by mouth Daily.     •  "magnesium oxide (MAG-OX) 400 MG tablet Take 1 tablet by mouth 2 (Two) Times a Day. (Patient not taking: Reported on 4/7/2023) 6 tablet 0     No facility-administered encounter medications on file as of 4/7/2023.     ADULT ILLNESSES:  Patient Active Problem List   Diagnosis Code   • Ovarian cancer C56.9     SURGERIES:  Past Surgical History:   Procedure Laterality Date   • VENA CAVA FILTER INSERTION     • VENOUS ACCESS DEVICE (PORT) INSERTION N/A 12/30/2022    Procedure: INSERTION VENOUS ACCESS DEVICE;  Surgeon: Holley Mcgill MD;  Location: [facility];  Service: General;  Laterality: N/A;     HEALTH MAINTENANCE ITEMS:  Health Maintenance Due   Topic Date Due   • MAMMOGRAM  Never done   • COLORECTAL CANCER SCREENING  Never done   • COVID-19 Vaccine (1) Never done   • TDAP/TD VACCINES (2 - Tdap) 02/24/2008   • ZOSTER VACCINE (1 of 2) Never done   • HEPATITIS C SCREENING  Never done   • ANNUAL PHYSICAL  Never done   • PAP SMEAR  Never done       <no information>  Last Completed Colonoscopy     This patient has no relevant Health Maintenance data.          There is no immunization history on file for this patient.  Last Completed Mammogram     This patient has no relevant Health Maintenance data.            FAMILY HISTORY:  No family history on file.  SOCIAL HISTORY:  Social History     Socioeconomic History   • Marital status:    Tobacco Use   • Smoking status: Never   Vaping Use   • Vaping Use: Never used   Substance and Sexual Activity   • Alcohol use: Never   • Drug use: Never   • Sexual activity: Defer       REVIEW OF SYSTEMS:    Review of Systems   Constitutional: Negative for fatigue, fever and unexpected weight change.        \"I feel great today.\"   HENT: Negative for congestion, mouth sores and trouble swallowing.    Eyes: Negative for redness and visual disturbance.   Respiratory: Negative for cough, shortness of breath and wheezing.    Cardiovascular: Negative for chest pain and leg swelling. " "  Gastrointestinal: Negative for abdominal pain, nausea and vomiting.   Endocrine: Negative for polydipsia and polyphagia.   Genitourinary: Negative for difficulty urinating, dysuria and flank pain.   Musculoskeletal: Negative for gait problem and joint swelling.   Skin: Positive for pallor.   Allergic/Immunologic: Negative for food allergies.   Neurological: Negative for dizziness, speech difficulty and weakness.   Hematological: Negative for adenopathy. Does not bruise/bleed easily.   Psychiatric/Behavioral: Negative for agitation, confusion and hallucinations.       VITAL SIGNS: /72   Pulse 111   Temp 97.3 °F (36.3 °C)   Resp 18   Ht 170.2 cm (67.01\")   Wt 80.3 kg (177 lb)   SpO2 98%   Breastfeeding No   BMI 27.71 kg/m²  Gained 4 pounds.   Pain Score    04/07/23 0906   PainSc: 0-No pain       PHYSICAL EXAMINATION:     Physical Exam  Vitals reviewed.   Constitutional:       General: She is not in acute distress.  HENT:      Head: Normocephalic and atraumatic.   Eyes:      General: No scleral icterus.  Cardiovascular:      Rate and Rhythm: Normal rate.   Pulmonary:      Effort: No respiratory distress.      Breath sounds: No wheezing or rales.      Comments: Port, left. No erythema.  Abdominal:      General: Bowel sounds are normal.      Palpations: Abdomen is soft.      Tenderness: There is no abdominal tenderness.   Musculoskeletal:         General: No swelling.      Cervical back: Neck supple.   Skin:     General: Skin is warm.      Coloration: Skin is pale.   Neurological:      Mental Status: She is alert and oriented to person, place, and time.   Psychiatric:         Mood and Affect: Mood normal.         Behavior: Behavior normal.         Thought Content: Thought content normal.         Judgment: Judgment normal.         LABS    Lab Results - Last 18 Months   Lab Units 04/07/23  0831 03/15/23  1258 02/24/23  0757 02/03/23  0815 01/09/23  0845 12/28/22  1337 12/15/22  1419   HEMOGLOBIN g/dL 9.1* " 9.4* 10.1* 11.5* 11.8* 11.3* 11.4*   HEMATOCRIT % 27.4* 28.6* 30.9* 36.2 37.6 34.7 36.7   MCV fL 96.8 92.6 89.3 89.4 89.7 88.1 90.6   WBC 10*3/mm3 6.85 7.13 9.62 8.17 8.55 8.87 9.66   RDW % 19.3* 17.8* 16.0* 14.5 13.2 13.3 13.2   MPV fL 9.4 9.4 10.1 9.3 9.9 9.6 10.2   PLATELETS 10*3/mm3 104* 110* 140 330 282 308 313   IMM GRAN % % 0.4 0.3 0.3 0.4 0.4  --  0.4   NEUTROS ABS 10*3/mm3 5.79 4.48 8.21* 7.12* 6.02  --  6.37   LYMPHS ABS 10*3/mm3 0.70 1.97 0.88 0.72 1.68  --  2.22   MONOS ABS 10*3/mm3 0.32 0.63 0.49 0.29 0.68  --  0.79   EOS ABS 10*3/mm3 0.00 0.01 0.00 0.00 0.09  --  0.18   BASOS ABS 10*3/mm3 0.01 0.02 0.01 0.01 0.05  --  0.06   IMMATURE GRANS (ABS) 10*3/mm3 0.03 0.02 0.03 0.03 0.03  --  0.04   NRBC /100 WBC 0.0 0.0 0.0 0.0 0.0  --  0.0       Lab Results - Last 18 Months   Lab Units 04/07/23  0831 03/15/23  1257 02/24/23  0757 02/03/23  0815 01/09/23  0845 12/28/22  1337   GLUCOSE mg/dL 112* 83 140* 130* 90 97   SODIUM mmol/L 137 140 138 138 142 144   POTASSIUM mmol/L 4.2 4.2 4.3 4.6 4.1 3.8   CO2 mmol/L 24.0 25.0 23.0 25.0 29.0 30.0*   CHLORIDE mmol/L 101 103 103 104 105 104   ANION GAP mmol/L 12.0 12.0 12.0 9.0 8.0 10.0   CREATININE mg/dL 1.15* 1.13* 1.06* 1.08* 1.37* 1.29*   BUN mg/dL 25* 19 24* 23* 17 17   BUN / CREAT RATIO  21.7 16.8 22.6 21.3 12.4 13.2   CALCIUM mg/dL 10.0 9.8 10.1 9.9 9.9 9.8   ALK PHOS U/L 88 82 86 80 71 68   TOTAL PROTEIN g/dL 7.2 6.9 7.1 7.7 7.4 7.4   ALT (SGPT) U/L 21 21 22 13 10 14   AST (SGOT) U/L 18 18 18 13 14 18   BILIRUBIN mg/dL 0.2 0.2 0.3 0.2 0.4 0.2   ALBUMIN g/dL 4.3 4.4 4.4 4.6 4.3 4.2   GLOBULIN gm/dL 2.9 2.5 2.7 3.1 3.1 3.2       No results for input(s): MSPIKE, KAPPALAMB, IGLFLC, URICACID, FREEKAPPAL, CEA, LDH, REFLABREPO in the last [id number] hours.    Lab Results - Last 18 Months   Lab Units 01/09/23  0845   IRON mcg/dL 35*   TIBC mcg/dL 308   IRON SATURATION % 11*   FERRITIN ng/mL 379.80*       Tiff Elizalde reports a pain score of 0.   "          ASSESSMENT:  1.  Ovarian cancer, low-grade serous carcinoma, left.  Tumor size 18 cm  AJCC stage:IIIA1 (pT3, pN1, cM0, G1)  Treatment status:Recveiving adjuvant Taxol and carboplatin 01/10/2023 through present.  2.  Performance status of 0.  3.  Left deep venous thrombosis from malignancy. Receiving apixaban.   4.  Pulmonary embolism from malignancy. Receiving apixaban.  Post IVC filter 11/22/2022.  5.  Anemia from iron deficiency and chronic kidney disease stage IIIa, GFR 59.3 ml/min on 2/3/2023. On oral iron 01/10/2023 through present.    6.  Hypersensitivity reaction to paclitaxel on 01/09/2023. Taking Decadron premed.   7.  Grade 1 neuropathy. Under observation. Gabapentin on hold.           PLAN:  1.   Re: Tolerance to chemo \"I was just wore out. Not sick.\"  2.   Re: Tolerance to oral iron.  \"Fine. No problems.\"  3.   Re: Heme status.   WBC 6.8, hemoglobin 9.1, hematocrit 27.4, MCV 96.8 and platelet 104.  4.   Re: CMP.  GFR 55 from 56.2 mL per minute   5.   Re: Magnesium 1.9.  6.   Re: Anti histamine prior to Neulasta.  \"It hits me Sunday afternoon through Monday.\"   7.  Continue adjuvant chemo.  Monitor for anaphylaxis, recurrent infusion reactions, nausea, vomiting, alopecia, depression and neuropathy.  8.  Schedule chemo C5D1 on 4/7/2023 and every 21 days.  Plan for 6 cycles.    Carboplatin AUC = 6.  Taxol 175 mg/m2 over 3 hours.   9.  Premedicate with:  Aloxi 0.25 mg IV  Emend 150 mg IV  Decadron 12 mg IV  Pepcid 20 mg IV  Benadryl 50 mg IV  10. Neulasta Onpro 6 mg D1 of chemo as primary prophylaxis. High risk for febrile neutropenia.    11.  Weekly CBC with differential, CMP, and magnesium.  12.  eRx for Zofran 8 mg p.o. every 8 hours as needed for nausea and vomiting #60, 2 refills if needed.  13.  eRx for Compazine 10 mg p.o. every 4 hours as needed for nausea and vomiting #60, 2 refills if needed.  14.  eRx Decadron 4 mg po start night before chemo and " "on the day of chemo, 10, 2 refills.  15.  eRx gabapentin 300 mg po three times daily, 90, 2 refills if needed. Observe for  dizziness, lightheadedness, or somnolence.  16.  Continue ongoing management per primary care physician and other specialists.  17.  Plan of care discussed with patient and spouse Dennis.  Understanding expressed.  Patient agreeable to proceed.  18.  Questions were answered to her satisfaction. \"Yea.\"  19.  Order 1 liter NS over 2 hours as needed at [facility].   20.  Obtain CT scans from [facility].  21.  Return to office 4/26/2023. Laboratory 4/28/2023.         I have reviewed the assessment and plan and verified the accuracy of it. No changes to assessment and plan since the information was documented. Walker Magana MD 04/07/23         I spent 32 total minutes, face-to-face, caring for Tiff today.  Greater than 50% of this time involved counseling and/or coordination of care as documented within this note regarding the patient's illness(es), pros and cons of various treatment options, instructions and/or risk reduction.                 (Holley Mcgill MD)  MD Tyrone Galindo MD        "

## 2023-04-12 NOTE — PROGRESS NOTES
[facility] HEMATOLOGY & ONCOLOGY [address]  [facility] [address] [phone]    Patient Name: Tiff Elizalde  Encounter Date: 04/26/2023  YOB: 1965  Patient Number: [id number]      REASON FOR FOLLOW-UP: Tiff Elizalde is a pleasant 57 y.o.  female who is seen on follow-up for stage III A1 left ovarian cancer. She is seen C5D20 of adjuvant paclitaxel and carboplatin.  She had hypersensitivity reaction to paclitaxel on C1D1.  She is seen with Dennis, spouse. History is obtained from patient.  She is a reliable historian.        Oncology/Hematology History Overview Note   DIAGNOSTIC ABNORMALITIES:   Pain lower back and left leg swelling.  She presented to her PCP.   CT scan done at [facility]. Details not available.  Patient seen by Dr. Virgilio Hayward 11/23/2022. Patient found to have a large pelvic mass causing bilateral hydronephrosis with resultant nonfunctioning left kidney, elevated creatinine, left deep venous thrombosis and pulmonary embolus.  Pathology report 11/28/2022.  Fallopian tube and ovary, left salpingo-oophorectomy: Fallopian tube with no evidence of involvement by malignancy.  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  No surface involvement demonstrated.  Uterus, fallopian tube and ovary, hysterectomy with right salpingo-oophorectomy: Cervix and endocervix with no evidence of squamous or glandular dysplasia.  Chronic cervicitis present.  Weakly proliferative endometrium, negative for hyperplasia, carcinoma, and endometritis.  Benign endometrial polyp present.  Adenomyosis.  No leiomyomata.  Right fallopian tube with focal involvement by serous neoplasm with Samona by this most consistent with low-grade serous carcinoma.  Remnant left ovary with adhesions to uterine serosa and focal involvement by low-grade serous carcinoma.  Right ovary with surface  involvement by low-grade serous carcinoma.  Omentum biopsy: Involved by low-grade serous carcinoma, invasive).  Sidewall, left pelvic biopsy: Edema with hemorrhage and chronic inflammation, negative for endometriosis and malignancy.  Lymph node left pelvic biopsy: 1 lymph node with a 0.9 mm focus of metastatic serous carcinoma surrounding adipose tissue with hemorrhage and edema.           PREVIOUS INTERVENTIONS:  IVC filter was placed 11/22/2022 at [facility].  She had undergone exploratory laparotomy, total abdominal hysterectomy, bilateral salpingo-oophorectomy, debulking, left pelvic lymph node dissection and omentectomy by Dr. Hayward on 11/23/2022.  Estimated blood loss 400 ml.  Laparotomy showed a massive mass arising from the left ovary, retroperitoneal, infiltrating the retroperitoneal space and adherent to the sigmoid mesentery.  Within the retroperitoneum it was densely adherent to the iliac vessel on the left side and ureter.  Deliberate rupture of the mass was required in order to access the retroperitoneal attachment.  The mass was sent for frozen section and determined to be at least borderline tumor possibly low-grade serous tumor.  There was no other evidence of disease in the pelvis or upper abdomen.  Due to being densely adherent to the retroperitoneum and iliac vessels, it is unclear whether the entire tumor was removed or not and the pelvic sidewall biopsy was taken to determine whether the residual ovary was present and not a fibrotic reaction.  Bilateral ureterolysis was required.   Hypersensitivity reaction to Taxol 01/09/2023.   Adjuvant paclitaxel and carboplatin 01/10/2023 through present.      Ovarian cancer   12/15/2022 Initial Diagnosis    Ovarian cancer (HCC)     1/9/2023 -  Chemotherapy    OP OVARIAN PACLitaxel / CARBOplatin (Q21D)     1/9/2023 -  Chemotherapy    OP CENTRAL VENOUS ACCESS DEVICE ACCESS, CARE, AND MAINTENANCE (CVAD)     1/20/2023 Cancer Staged    Staging form: Ovary,  Fallopian Tube, And Primary Peritoneal Carcinoma, AJCC 8th Edition  - Pathologic stage from 1/20/2023: FIGO Stage III (pT3, pN1, cM0) - Signed by Walker Magana MD on 1/20/2023     2/15/2023 - 2/15/2023 Chemotherapy    OP MAGNESIUM          PAST MEDICAL HISTORY:  ALLERGIES:  No Known Allergies  CURRENT MEDICATIONS:  Outpatient Encounter Medications as of 4/26/2023   Medication Sig Dispense Refill   • Calcium Carbonate-Vit D-Min (CALCIUM 1200 PO) Take  by mouth.     • dexamethasone (DECADRON) 4 MG tablet Take 1 pill night before chemo and morning of chemo. 10 tablet 2   • Eliquis 5 MG tablet tablet 1 tablet Every 12 (Twelve) Hours.     • ferrous sulfate 325 (65 FE) MG tablet Take 1 tablet by mouth Daily With Breakfast. 60 tablet 2   • lidocaine-prilocaine (EMLA) 2.5-2.5 % cream Apply to port site 30 minutes prior to being accessed. 30 g 0   • lisinopril-hydrochlorothiazide (PRINZIDE,ZESTORETIC) 20-12.5 MG per tablet Take 1 tablet by mouth Daily.     • magnesium oxide (MAG-OX) 400 MG tablet Take 1 tablet by mouth 2 (Two) Times a Day. 6 tablet 0   • vitamin D3 125 MCG (5000 UT) capsule capsule Take  by mouth Daily.     • HYDROcodone-acetaminophen (NORCO) 7.5-325 MG per tablet Take 1 tablet by mouth Every 4 (Four) Hours As Needed for Moderate Pain (Pain). (Patient not taking: Reported on 4/26/2023) 15 tablet 0   • ondansetron (Zofran) 8 MG tablet Take 1 tablet by mouth Every 8 (Eight) Hours As Needed for Nausea or Vomiting. (Patient not taking: Reported on 4/26/2023) 60 tablet 2   • prochlorperazine (COMPAZINE) 10 MG tablet Take 1 tablet by mouth Every 6 (Six) Hours As Needed for Nausea. (Patient not taking: Reported on 4/26/2023) 60 tablet 2     No facility-administered encounter medications on file as of 4/26/2023.     ADULT ILLNESSES:  Patient Active Problem List   Diagnosis Code   • Ovarian cancer C56.9     SURGERIES:  Past Surgical History:   Procedure Laterality Date   • VENA CAVA FILTER INSERTION     • VENOUS  "ACCESS DEVICE (PORT) INSERTION N/A 12/30/2022    Procedure: INSERTION VENOUS ACCESS DEVICE;  Surgeon: Holley Mcgill MD;  Location: [facility] OR;  Service: General;  Laterality: N/A;     HEALTH MAINTENANCE ITEMS:  Health Maintenance Due   Topic Date Due   • MAMMOGRAM  Never done   • COLORECTAL CANCER SCREENING  Never done   • TDAP/TD VACCINES (2 - Tdap) 02/24/2008   • ZOSTER VACCINE (1 of 2) Never done   • COVID-19 Vaccine (4 - Booster for Moderna series) 03/24/2022   • HEPATITIS C SCREENING  Never done   • ANNUAL PHYSICAL  Never done   • PAP SMEAR  Never done       <no information>  Last Completed Colonoscopy     This patient has no relevant Health Maintenance data.          There is no immunization history on file for this patient.  Last Completed Mammogram     This patient has no relevant Health Maintenance data.            FAMILY HISTORY:  No family history on file.  SOCIAL HISTORY:  Social History     Socioeconomic History   • Marital status:    Tobacco Use   • Smoking status: Never   Vaping Use   • Vaping Use: Never used   Substance and Sexual Activity   • Alcohol use: Never   • Drug use: Never   • Sexual activity: Defer       REVIEW OF SYSTEMS:    Review of Systems   Constitutional: Negative for fatigue, fever and unexpected weight change.        \"I feel good today. Last Sunday, I was trying on clothes and all of a sudden, I had no energy for 2-3 minutes and I am okay.\"   HENT: Negative for congestion, mouth sores and trouble swallowing.    Eyes: Negative for discharge and redness.   Respiratory: Negative for cough, shortness of breath and wheezing.    Cardiovascular: Negative for chest pain and leg swelling.   Gastrointestinal: Negative for diarrhea, nausea and vomiting.   Endocrine: Negative for polydipsia.   Genitourinary: Negative for difficulty urinating, dysuria and flank pain.   Musculoskeletal: Negative for gait problem and joint swelling.   Skin: Positive for pallor.   Allergic/Immunologic: " "Negative for food allergies.   Neurological: Negative for dizziness, speech difficulty and weakness.   Hematological: Negative for adenopathy.   Psychiatric/Behavioral: Negative for agitation, confusion and hallucinations.       VITAL SIGNS: /72   Pulse 67   Temp 97.6 °F (36.4 °C) (Tympanic)   Resp 18   Ht 170.2 cm (67\")   Wt 80 kg (176 lb 6.4 oz)   SpO2 96%   BMI 27.63 kg/m²   Pain Score    04/26/23 0900   PainSc: 0-No pain       PHYSICAL EXAMINATION:     Physical Exam  Vitals reviewed.   Constitutional:       General: She is not in acute distress.  HENT:      Head: Normocephalic and atraumatic.   Eyes:      General: No scleral icterus.  Cardiovascular:      Rate and Rhythm: Normal rate.   Pulmonary:      Effort: No respiratory distress.      Breath sounds: No wheezing or rales.      Comments: Port, left. No erythema.  Abdominal:      General: Bowel sounds are normal.      Palpations: Abdomen is soft.      Tenderness: There is no abdominal tenderness.   Musculoskeletal:         General: No swelling.      Cervical back: Neck supple.   Skin:     General: Skin is warm.      Coloration: Skin is pale.   Neurological:      Mental Status: She is alert and oriented to person, place, and time.   Psychiatric:         Mood and Affect: Mood normal.         Behavior: Behavior normal.         Thought Content: Thought content normal.         Judgment: Judgment normal.         LABS    Lab Results - Last 18 Months   Lab Units 04/07/23  0831 03/15/23  1258 02/24/23  0757 02/03/23  0815 01/09/23  0845 12/28/22  1337 12/15/22  1419   HEMOGLOBIN g/dL 9.1* 9.4* 10.1* 11.5* 11.8* 11.3* 11.4*   HEMATOCRIT % 27.4* 28.6* 30.9* 36.2 37.6 34.7 36.7   MCV fL 96.8 92.6 89.3 89.4 89.7 88.1 90.6   WBC 10*3/mm3 6.85 7.13 9.62 8.17 8.55 8.87 9.66   RDW % 19.3* 17.8* 16.0* 14.5 13.2 13.3 13.2   MPV fL 9.4 9.4 10.1 9.3 9.9 9.6 10.2   PLATELETS 10*3/mm3 104* 110* 140 330 282 308 313   IMM GRAN % % 0.4 0.3 0.3 0.4 0.4  --  0.4   NEUTROS " ABS 10*3/mm3 5.79 4.48 8.21* 7.12* 6.02  --  6.37   LYMPHS ABS 10*3/mm3 0.70 1.97 0.88 0.72 1.68  --  2.22   MONOS ABS 10*3/mm3 0.32 0.63 0.49 0.29 0.68  --  0.79   EOS ABS 10*3/mm3 0.00 0.01 0.00 0.00 0.09  --  0.18   BASOS ABS 10*3/mm3 0.01 0.02 0.01 0.01 0.05  --  0.06   IMMATURE GRANS (ABS) 10*3/mm3 0.03 0.02 0.03 0.03 0.03  --  0.04   NRBC /100 WBC 0.0 0.0 0.0 0.0 0.0  --  0.0       Lab Results - Last 18 Months   Lab Units 04/07/23  0831 03/15/23  1257 02/24/23  0757 02/03/23  0815 01/09/23  0845 12/28/22  1337   GLUCOSE mg/dL 112* 83 140* 130* 90 97   SODIUM mmol/L 137 140 138 138 142 144   POTASSIUM mmol/L 4.2 4.2 4.3 4.6 4.1 3.8   CO2 mmol/L 24.0 25.0 23.0 25.0 29.0 30.0*   CHLORIDE mmol/L 101 103 103 104 105 104   ANION GAP mmol/L 12.0 12.0 12.0 9.0 8.0 10.0   CREATININE mg/dL 1.15* 1.13* 1.06* 1.08* 1.37* 1.29*   BUN mg/dL 25* 19 24* 23* 17 17   BUN / CREAT RATIO  21.7 16.8 22.6 21.3 12.4 13.2   CALCIUM mg/dL 10.0 9.8 10.1 9.9 9.9 9.8   ALK PHOS U/L 88 82 86 80 71 68   TOTAL PROTEIN g/dL 7.2 6.9 7.1 7.7 7.4 7.4   ALT (SGPT) U/L 21 21 22 13 10 14   AST (SGOT) U/L 18 18 18 13 14 18   BILIRUBIN mg/dL 0.2 0.2 0.3 0.2 0.4 0.2   ALBUMIN g/dL 4.3 4.4 4.4 4.6 4.3 4.2   GLOBULIN gm/dL 2.9 2.5 2.7 3.1 3.1 3.2       No results for input(s): MSPIKE, KAPPALAMB, IGLFLC, URICACID, FREEKAPPAL, CEA, LDH, REFLABREPO in the last 33168 hours.    Lab Results - Last 18 Months   Lab Units 01/09/23  0845   IRON mcg/dL 35*   TIBC mcg/dL 308   IRON SATURATION % 11*   FERRITIN ng/mL 379.80*       Tiff Elizalde reports a pain score of 0.           ASSESSMENT:  1.  Ovarian cancer, low-grade serous carcinoma, left.  Tumor size 18 cm  AJCC stage:IIIA1 (pT3, pN1, cM0, G1)  Treatment status: Undergoing therapy with adjuvant Taxol and carboplatin 01/10/2023 through present.  2.  Performance status of 0.  3.  Left deep venous thrombosis from malignancy.Taking apixaban.  4.  Pulmonary embolism from malignancy. Taking apixaban.  Post IVC filter  "11/22/2022.  5.  Anemia from iron deficiency and chronic kidney disease stage IIIa, GFR 59.3 ml/min on 2/3/2023. On oral iron 01/10/2023 through present.    6.  Hypersensitivity reaction to paclitaxel on 01/09/2023. Taking Decadron as premed.   7.  Grade 1 neuropathy. Under observation. Gabapentin on hold.           PLAN:  1.   Re: Tolerance to chemo \"I was more tired.\"  2.   Re: Tolerance to oral iron.  \"It's fine.\"  3.   Re: Heme status.  WBC 5.7, hemoglobin 8.8, hematocrit 27.5, .4 and platelets 77,  4.   Re: CMP.  GFR 40 mL per minute   5.   Re: Magnesium 2.  6.   Re: Anti histamine prior to Neulasta.  \"Yes.\"   7.  Continue adjuvant chemo.  Observe for potential anaphylaxis, recurrent infusion reactions, nausea, vomiting, alopecia, neuropathy and myelosuppression.  8.  Schedule chemo C6D1 on 4/28/2023 and every 21 days.  Plan for 6 cycles.    Carboplatin AUC = 6.  Taxol 175 mg/m2 over 3 hours.   9.  Premedicate with:  Aloxi 0.25 mg IV  Emend 150 mg IV  Decadron 12 mg IV  Pepcid 20 mg IV  Benadryl 50 mg IV  10. Neulasta Onpro 6 mg D1 of chemo as primary prophylaxis. High risk for febrile neutropenia.    11.  Weekly CBC with differential, CMP, and magnesium.  12.  eRx for Zofran 8 mg p.o. every 8 hours as needed for nausea and vomiting #60, 2 refills if needed.  13.  eRx for Compazine 10 mg p.o. every 4 hours as needed for nausea and vomiting #60, 2 refills if needed.  14.  eRx Decadron 4 mg po start night before chemo and on the day of chemo, 10, 2 refills.  15.  eRx gabapentin 300 mg po three times daily, 90, 2 refills if needed.  Monitor for somnolence, dizziness or hypotension.  16.  Continue ongoing management per primary care physician and other specialists.  17.  Plan of care discussed with patient and Dennis.  Understanding expressed.  Patient agreeable to proceed.  18.  Questions were answered to her satisfaction. \"Yes.\"  19.  Order 1 liter NS over 2 hours as " needed at [facility].   20.  Obtain CT scans from [facility].  21.  Flush port every 6 weeks.   22.  Plan to remove temporary IVC after review of scan.   23.  Return to office in 4 weeks with pre-office left leg venous doppler CT of the chest, abdomen, pelvis, and .         I have reviewed the assessment and plan and verified the accuracy of it. No changes to assessment and plan since the information was documented. Walker Magana MD 04/26/23       I spent 33 total minutes, face-to-face, caring for Tiff today.  Greater than 50% of this time involved counseling and/or coordination of care as documented within this note regarding the patient's illness(es), pros and cons of various treatment options, instructions and/or risk reduction.             (Holley Mcgill MD)  MD Tyrone Galindo MD

## 2023-04-14 ENCOUNTER — TELEPHONE (OUTPATIENT)
Dept: ONCOLOGY | Facility: CLINIC | Age: 58
End: 2023-04-14
Payer: COMMERCIAL

## 2023-04-14 NOTE — TELEPHONE ENCOUNTER
Notified patient Emilia Elizalde that her Mag level is low @ 1.6 she will need to take her oral Mag tablets that she has on hand over the weekend and recheck her lab again on Monday 4/17/23,  Mag 400 mg Qd Friday, Linda Swratz  Patient v/u of instruction

## 2023-04-14 NOTE — TELEPHONE ENCOUNTER
----- Message from Walker Magana MD sent at 4/14/2023 12:10 PM CDT -----  Magnesium 1.6.  Order magnesium oxide 400 mg p.o. daily for 3 days.

## 2023-04-17 ENCOUNTER — TELEPHONE (OUTPATIENT)
Dept: ONCOLOGY | Facility: CLINIC | Age: 58
End: 2023-04-17
Payer: COMMERCIAL

## 2023-04-17 NOTE — TELEPHONE ENCOUNTER
----- Message from Walker Magana MD sent at 4/17/2023 12:36 PM CDT -----  Magnesium 1.7.  Order magnesium oxide 400 mg p.o. daily for 3 days.

## 2023-04-17 NOTE — TELEPHONE ENCOUNTER
Notified patient Tiff Elizalde, her Magnesium remains on the low side and per Dr Magana instructions to continue with 3 more days of oral Mag tablets for the next 3 days  Magnesium 400 mg po bid x 3 days  Patient v/u of instruction.

## 2023-04-20 RX ORDER — SODIUM CHLORIDE 9 MG/ML
250 INJECTION, SOLUTION INTRAVENOUS ONCE
OUTPATIENT
Start: 2023-04-28

## 2023-04-20 RX ORDER — PALONOSETRON 0.05 MG/ML
0.25 INJECTION, SOLUTION INTRAVENOUS ONCE
OUTPATIENT
Start: 2023-04-28

## 2023-04-20 RX ORDER — FAMOTIDINE 10 MG/ML
20 INJECTION, SOLUTION INTRAVENOUS AS NEEDED
OUTPATIENT
Start: 2023-04-28

## 2023-04-20 RX ORDER — FAMOTIDINE 10 MG/ML
20 INJECTION, SOLUTION INTRAVENOUS ONCE
OUTPATIENT
Start: 2023-04-28

## 2023-04-20 RX ORDER — DIPHENHYDRAMINE HYDROCHLORIDE 50 MG/ML
50 INJECTION INTRAMUSCULAR; INTRAVENOUS AS NEEDED
OUTPATIENT
Start: 2023-04-28

## 2023-04-26 ENCOUNTER — OFFICE VISIT (OUTPATIENT)
Dept: ONCOLOGY | Facility: CLINIC | Age: 58
End: 2023-04-26
Payer: COMMERCIAL

## 2023-04-26 VITALS
BODY MASS INDEX: 27.69 KG/M2 | SYSTOLIC BLOOD PRESSURE: 124 MMHG | OXYGEN SATURATION: 96 % | HEIGHT: 67 IN | DIASTOLIC BLOOD PRESSURE: 72 MMHG | TEMPERATURE: 97.6 F | RESPIRATION RATE: 18 BRPM | WEIGHT: 176.4 LBS | HEART RATE: 67 BPM

## 2023-04-26 DIAGNOSIS — I82.4Z2 DVT, LOWER EXTREMITY, DISTAL, ACUTE, LEFT: ICD-10-CM

## 2023-04-26 DIAGNOSIS — C56.2 MALIGNANT NEOPLASM OF LEFT OVARY: Primary | ICD-10-CM

## 2023-04-26 NOTE — LETTER
April 26, 2023       No Recipients    Patient: Tiff Elizalde   YOB: 1965   Date of Visit: 4/26/2023       Dear Dr. Haines Recipients:    Thank you for referring Tiff Elizalde to me for evaluation. Below are the relevant portions of my assessment and plan of care.    If you have questions, please do not hesitate to call me. I look forward to following Tiff along with you.         Sincerely,        Walker Magana MD        CC:   No Recipients    Walker Magana MD  04/26/23 0935  Signed  [facility] HEMATOLOGY & ONCOLOGY [address]  [facility] [id number] [phone]    Patient Name: Tiff Elizalde  Encounter Date: 04/26/2023  YOB: 1965  Patient Number: [id number]      REASON FOR FOLLOW-UP: Tiff Elizalde is a pleasant 57 y.o.  female who is seen on follow-up for stage III A1 left ovarian cancer. She is seen C5D20 of adjuvant paclitaxel and carboplatin.  She had hypersensitivity reaction to paclitaxel on C1D1.  She is seen with Dennis, jose luis. History is obtained from patient.  She is a reliable historian.        Oncology/Hematology History Overview Note   DIAGNOSTIC ABNORMALITIES:   Pain lower back and left leg swelling.  She presented to her PCP.   CT scan done at [facility]. Details not available.  Patient seen by Dr. Virgilio Hayward 11/23/2022. Patient found to have a large pelvic mass causing bilateral hydronephrosis with resultant nonfunctioning left kidney, elevated creatinine, left deep venous thrombosis and pulmonary embolus.  Pathology report 11/28/2022.  Fallopian tube and ovary, left salpingo-oophorectomy: Fallopian tube with no evidence of involvement by malignancy.  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  No surface involvement demonstrated.  Uterus, fallopian tube and ovary, hysterectomy with right salpingo-oophorectomy: Cervix and endocervix with no  evidence of squamous or glandular dysplasia.  Chronic cervicitis present.  Weakly proliferative endometrium, negative for hyperplasia, carcinoma, and endometritis.  Benign endometrial polyp present.  Adenomyosis.  No leiomyomata.  Right fallopian tube with focal involvement by serous neoplasm with Samona by this most consistent with low-grade serous carcinoma.  Remnant left ovary with adhesions to uterine serosa and focal involvement by low-grade serous carcinoma.  Right ovary with surface involvement by low-grade serous carcinoma.  Omentum biopsy: Involved by low-grade serous carcinoma, invasive).  Sidewall, left pelvic biopsy: Edema with hemorrhage and chronic inflammation, negative for endometriosis and malignancy.  Lymph node left pelvic biopsy: 1 lymph node with a 0.9 mm focus of metastatic serous carcinoma surrounding adipose tissue with hemorrhage and edema.           PREVIOUS INTERVENTIONS:  IVC filter was placed 11/22/2022 at [place].  She had undergone exploratory laparotomy, total abdominal hysterectomy, bilateral salpingo-oophorectomy, debulking, left pelvic lymph node dissection and omentectomy by Dr. Hayward on 11/23/2022.  Estimated blood loss 400 ml.  Laparotomy showed a massive mass arising from the left ovary, retroperitoneal, infiltrating the retroperitoneal space and adherent to the sigmoid mesentery.  Within the retroperitoneum it was densely adherent to the iliac vessel on the left side and ureter.  Deliberate rupture of the mass was required in order to access the retroperitoneal attachment.  The mass was sent for frozen section and determined to be at least borderline tumor possibly low-grade serous tumor.  There was no other evidence of disease in the pelvis or upper abdomen.  Due to being densely adherent to the retroperitoneum and iliac vessels, it is unclear whether the entire tumor was removed or not and the pelvic sidewall biopsy was taken to determine whether the residual ovary was  present and not a fibrotic reaction.  Bilateral ureterolysis was required.   Hypersensitivity reaction to Taxol 01/09/2023.   Adjuvant paclitaxel and carboplatin 01/10/2023 through present.      Ovarian cancer   12/15/2022 Initial Diagnosis    Ovarian cancer (HCC)     1/9/2023 -  Chemotherapy    OP OVARIAN PACLitaxel / CARBOplatin (Q21D)     1/9/2023 -  Chemotherapy    OP CENTRAL VENOUS ACCESS DEVICE ACCESS, CARE, AND MAINTENANCE (CVAD)     1/20/2023 Cancer Staged    Staging form: Ovary, Fallopian Tube, And Primary Peritoneal Carcinoma, AJCC 8th Edition  - Pathologic stage from 1/20/2023: FIGO Stage III (pT3, pN1, cM0) - Signed by Walker Magana MD on 1/20/2023     2/15/2023 - 2/15/2023 Chemotherapy    OP MAGNESIUM          PAST MEDICAL HISTORY:  ALLERGIES:  No Known Allergies  CURRENT MEDICATIONS:  Outpatient Encounter Medications as of 4/26/2023   Medication Sig Dispense Refill   • Calcium Carbonate-Vit D-Min (CALCIUM 1200 PO) Take  by mouth.     • dexamethasone (DECADRON) 4 MG tablet Take 1 pill night before chemo and morning of chemo. 10 tablet 2   • Eliquis 5 MG tablet tablet 1 tablet Every 12 (Twelve) Hours.     • ferrous sulfate 325 (65 FE) MG tablet Take 1 tablet by mouth Daily With Breakfast. 60 tablet 2   • lidocaine-prilocaine (EMLA) 2.5-2.5 % cream Apply to port site 30 minutes prior to being accessed. 30 g 0   • lisinopril-hydrochlorothiazide (PRINZIDE,ZESTORETIC) 20-12.5 MG per tablet Take 1 tablet by mouth Daily.     • magnesium oxide (MAG-OX) 400 MG tablet Take 1 tablet by mouth 2 (Two) Times a Day. 6 tablet 0   • vitamin D3 125 MCG (5000 UT) capsule capsule Take  by mouth Daily.     • HYDROcodone-acetaminophen (NORCO) 7.5-325 MG per tablet Take 1 tablet by mouth Every 4 (Four) Hours As Needed for Moderate Pain (Pain). (Patient not taking: Reported on 4/26/2023) 15 tablet 0   • ondansetron (Zofran) 8 MG tablet Take 1 tablet by mouth Every 8 (Eight) Hours As Needed for Nausea or Vomiting. (Patient not  "taking: Reported on 4/26/2023) 60 tablet 2   • prochlorperazine (COMPAZINE) 10 MG tablet Take 1 tablet by mouth Every 6 (Six) Hours As Needed for Nausea. (Patient not taking: Reported on 4/26/2023) 60 tablet 2     No facility-administered encounter medications on file as of 4/26/2023.     ADULT ILLNESSES:  Patient Active Problem List   Diagnosis Code   • Ovarian cancer C56.9     SURGERIES:  Past Surgical History:   Procedure Laterality Date   • VENA CAVA FILTER INSERTION     • VENOUS ACCESS DEVICE (PORT) INSERTION N/A 12/30/2022    Procedure: INSERTION VENOUS ACCESS DEVICE;  Surgeon: Holley Mcgill MD;  Location: [facility];  Service: General;  Laterality: N/A;     HEALTH MAINTENANCE ITEMS:  Health Maintenance Due   Topic Date Due   • MAMMOGRAM  Never done   • COLORECTAL CANCER SCREENING  Never done   • TDAP/TD VACCINES (2 - Tdap) 02/24/2008   • ZOSTER VACCINE (1 of 2) Never done   • COVID-19 Vaccine (4 - Booster for Moderna series) 03/24/2022   • HEPATITIS C SCREENING  Never done   • ANNUAL PHYSICAL  Never done   • PAP SMEAR  Never done       <no information>  Last Completed Colonoscopy     This patient has no relevant Health Maintenance data.          There is no immunization history on file for this patient.  Last Completed Mammogram     This patient has no relevant Health Maintenance data.            FAMILY HISTORY:  No family history on file.  SOCIAL HISTORY:  Social History     Socioeconomic History   • Marital status:    Tobacco Use   • Smoking status: Never   Vaping Use   • Vaping Use: Never used   Substance and Sexual Activity   • Alcohol use: Never   • Drug use: Never   • Sexual activity: Defer       REVIEW OF SYSTEMS:    Review of Systems   Constitutional: Negative for fatigue, fever and unexpected weight change.        \"I feel good today. Last Sunday, I was trying on clothes and all of a sudden, I had no energy for 2-3 minutes and I am okay.\"   HENT: Negative for congestion, mouth sores and " "trouble swallowing.    Eyes: Negative for discharge and redness.   Respiratory: Negative for cough, shortness of breath and wheezing.    Cardiovascular: Negative for chest pain and leg swelling.   Gastrointestinal: Negative for diarrhea, nausea and vomiting.   Endocrine: Negative for polydipsia.   Genitourinary: Negative for difficulty urinating, dysuria and flank pain.   Musculoskeletal: Negative for gait problem and joint swelling.   Skin: Positive for pallor.   Allergic/Immunologic: Negative for food allergies.   Neurological: Negative for dizziness, speech difficulty and weakness.   Hematological: Negative for adenopathy.   Psychiatric/Behavioral: Negative for agitation, confusion and hallucinations.       VITAL SIGNS: /72   Pulse 67   Temp 97.6 °F (36.4 °C) (Tympanic)   Resp 18   Ht 170.2 cm (67\")   Wt 80 kg (176 lb 6.4 oz)   SpO2 96%   BMI 27.63 kg/m²   Pain Score    04/26/23 0900   PainSc: 0-No pain       PHYSICAL EXAMINATION:     Physical Exam  Vitals reviewed.   Constitutional:       General: She is not in acute distress.  HENT:      Head: Normocephalic and atraumatic.   Eyes:      General: No scleral icterus.  Cardiovascular:      Rate and Rhythm: Normal rate.   Pulmonary:      Effort: No respiratory distress.      Breath sounds: No wheezing or rales.      Comments: Port, left. No erythema.  Abdominal:      General: Bowel sounds are normal.      Palpations: Abdomen is soft.      Tenderness: There is no abdominal tenderness.   Musculoskeletal:         General: No swelling.      Cervical back: Neck supple.   Skin:     General: Skin is warm.      Coloration: Skin is pale.   Neurological:      Mental Status: She is alert and oriented to person, place, and time.   Psychiatric:         Mood and Affect: Mood normal.         Behavior: Behavior normal.         Thought Content: Thought content normal.         Judgment: Judgment normal.         LABS    Lab Results - Last 18 Months   Lab Units " 04/07/23  0831 03/15/23  1258 02/24/23  0757 02/03/23  0815 01/09/23  0845 12/28/22  1337 12/15/22  1419   HEMOGLOBIN g/dL 9.1* 9.4* 10.1* 11.5* 11.8* 11.3* 11.4*   HEMATOCRIT % 27.4* 28.6* 30.9* 36.2 37.6 34.7 36.7   MCV fL 96.8 92.6 89.3 89.4 89.7 88.1 90.6   WBC 10*3/mm3 6.85 7.13 9.62 8.17 8.55 8.87 9.66   RDW % 19.3* 17.8* 16.0* 14.5 13.2 13.3 13.2   MPV fL 9.4 9.4 10.1 9.3 9.9 9.6 10.2   PLATELETS 10*3/mm3 104* 110* 140 330 282 308 313   IMM GRAN % % 0.4 0.3 0.3 0.4 0.4  --  0.4   NEUTROS ABS 10*3/mm3 5.79 4.48 8.21* 7.12* 6.02  --  6.37   LYMPHS ABS 10*3/mm3 0.70 1.97 0.88 0.72 1.68  --  2.22   MONOS ABS 10*3/mm3 0.32 0.63 0.49 0.29 0.68  --  0.79   EOS ABS 10*3/mm3 0.00 0.01 0.00 0.00 0.09  --  0.18   BASOS ABS 10*3/mm3 0.01 0.02 0.01 0.01 0.05  --  0.06   IMMATURE GRANS (ABS) 10*3/mm3 0.03 0.02 0.03 0.03 0.03  --  0.04   NRBC /100 WBC 0.0 0.0 0.0 0.0 0.0  --  0.0       Lab Results - Last 18 Months   Lab Units 04/07/23  0831 03/15/23  1257 02/24/23  0757 02/03/23  0815 01/09/23  0845 12/28/22  1337   GLUCOSE mg/dL 112* 83 140* 130* 90 97   SODIUM mmol/L 137 140 138 138 142 144   POTASSIUM mmol/L 4.2 4.2 4.3 4.6 4.1 3.8   CO2 mmol/L 24.0 25.0 23.0 25.0 29.0 30.0*   CHLORIDE mmol/L 101 103 103 104 105 104   ANION GAP mmol/L 12.0 12.0 12.0 9.0 8.0 10.0   CREATININE mg/dL 1.15* 1.13* 1.06* 1.08* 1.37* 1.29*   BUN mg/dL 25* 19 24* 23* 17 17   BUN / CREAT RATIO  21.7 16.8 22.6 21.3 12.4 13.2   CALCIUM mg/dL 10.0 9.8 10.1 9.9 9.9 9.8   ALK PHOS U/L 88 82 86 80 71 68   TOTAL PROTEIN g/dL 7.2 6.9 7.1 7.7 7.4 7.4   ALT (SGPT) U/L 21 21 22 13 10 14   AST (SGOT) U/L 18 18 18 13 14 18   BILIRUBIN mg/dL 0.2 0.2 0.3 0.2 0.4 0.2   ALBUMIN g/dL 4.3 4.4 4.4 4.6 4.3 4.2   GLOBULIN gm/dL 2.9 2.5 2.7 3.1 3.1 3.2       No results for input(s): MSPIKE, KAPPALAMB, IGLFLC, URICACID, FREEKAPPAL, CEA, LDH, REFLABREPO in the last 31462 hours.    Lab Results - Last 18 Months   Lab Units 01/09/23  0845   IRON mcg/dL 35*   TIBC mcg/dL 308  "  IRON SATURATION % 11*   FERRITIN ng/mL 379.80*       Tiff Elizalde reports a pain score of 0.           ASSESSMENT:  1.  Ovarian cancer, low-grade serous carcinoma, left.  Tumor size 18 cm  AJCC stage:IIIA1 (pT3, pN1, cM0, G1)  Treatment status: Undergoing therapy with adjuvant Taxol and carboplatin 01/10/2023 through present.  2.  Performance status of 0.  3.  Left deep venous thrombosis from malignancy.Taking apixaban.  4.  Pulmonary embolism from malignancy. Taking apixaban.  Post IVC filter 11/22/2022.  5.  Anemia from iron deficiency and chronic kidney disease stage IIIa, GFR 59.3 ml/min on 2/3/2023. On oral iron 01/10/2023 through present.    6.  Hypersensitivity reaction to paclitaxel on 01/09/2023. Taking Decadron as premed.   7.  Grade 1 neuropathy. Under observation. Gabapentin on hold.           PLAN:  1.   Re: Tolerance to chemo \"I was more tired.\"  2.   Re: Tolerance to oral iron.  \"It's fine.\"  3.   Re: Heme status.  WBC 5.7, hemoglobin 8.8, hematocrit 27.5, .4 and platelets 77,  4.   Re: CMP.  GFR 40 mL per minute   5.   Re: Magnesium 2.  6.   Re: Anti histamine prior to Neulasta.  \"Yes.\"   7.  Continue adjuvant chemo.  Observe for potential anaphylaxis, recurrent infusion reactions, nausea, vomiting, alopecia, neuropathy and myelosuppression.  8.  Schedule chemo C6D1 on 4/28/2023 and every 21 days.  Plan for 6 cycles.    Carboplatin AUC = 6.  Taxol 175 mg/m2 over 3 hours.   9.  Premedicate with:  Aloxi 0.25 mg IV  Emend 150 mg IV  Decadron 12 mg IV  Pepcid 20 mg IV  Benadryl 50 mg IV  10. Neulasta Onpro 6 mg D1 of chemo as primary prophylaxis. High risk for febrile neutropenia.    11.  Weekly CBC with differential, CMP, and magnesium.  12.  eRx for Zofran 8 mg p.o. every 8 hours as needed for nausea and vomiting #60, 2 refills if needed.  13.  eRx for Compazine 10 mg p.o. every 4 hours as needed for nausea and vomiting #60, 2 refills if needed.  14. " " eRx Decadron 4 mg po start night before chemo and on the day of chemo, 10, 2 refills.  15.  eRx gabapentin 300 mg po three times daily, 90, 2 refills if needed.  Monitor for somnolence, dizziness or hypotension.  16.  Continue ongoing management per primary care physician and other specialists.  17.  Plan of care discussed with patient and Dennis.  Understanding expressed.  Patient agreeable to proceed.  18.  Questions were answered to her satisfaction. \"Yes.\"  19.  Order 1 liter NS over 2 hours as needed at [facility].   20.  Obtain CT scans from [facility].  21.  Flush port every 6 weeks.   22.  Plan to remove temporary IVC after review of scan.   23.  Return to office in 4 weeks with pre-office left leg venous doppler CT of the chest, abdomen, pelvis, and .         I have reviewed the assessment and plan and verified the accuracy of it. No changes to assessment and plan since the information was documented. Walker Magana MD 04/26/23       I spent 33 total minutes, face-to-face, caring for Tiff today.  Greater than 50% of this time involved counseling and/or coordination of care as documented within this note regarding the patient's illness(es), pros and cons of various treatment options, instructions and/or risk reduction.             (Holley Mcgill MD)  MD Tyrone Galindo MD      "

## 2023-04-28 ENCOUNTER — LAB (OUTPATIENT)
Dept: LAB | Facility: HOSPITAL | Age: 58
End: 2023-04-28
Payer: COMMERCIAL

## 2023-04-28 ENCOUNTER — INFUSION (OUTPATIENT)
Dept: ONCOLOGY | Facility: HOSPITAL | Age: 58
End: 2023-04-28
Payer: COMMERCIAL

## 2023-04-28 VITALS
DIASTOLIC BLOOD PRESSURE: 77 MMHG | BODY MASS INDEX: 27.78 KG/M2 | OXYGEN SATURATION: 100 % | RESPIRATION RATE: 22 BRPM | SYSTOLIC BLOOD PRESSURE: 136 MMHG | WEIGHT: 177 LBS | HEIGHT: 67 IN | TEMPERATURE: 98 F | HEART RATE: 70 BPM

## 2023-04-28 DIAGNOSIS — C56.2 MALIGNANT NEOPLASM OF LEFT OVARY: Primary | ICD-10-CM

## 2023-04-28 DIAGNOSIS — C56.2 MALIGNANT NEOPLASM OF LEFT OVARY: ICD-10-CM

## 2023-04-28 LAB
ALBUMIN SERPL-MCNC: 4.6 G/DL (ref 3.5–5.2)
ALBUMIN/GLOB SERPL: 1.8 G/DL
ALP SERPL-CCNC: 84 U/L (ref 39–117)
ALT SERPL W P-5'-P-CCNC: 15 U/L (ref 1–33)
ANION GAP SERPL CALCULATED.3IONS-SCNC: 13 MMOL/L (ref 5–15)
AST SERPL-CCNC: 15 U/L (ref 1–32)
BASOPHILS # BLD AUTO: 0 10*3/MM3 (ref 0–0.2)
BASOPHILS NFR BLD AUTO: 0 % (ref 0–1.5)
BILIRUB SERPL-MCNC: 0.3 MG/DL (ref 0–1.2)
BUN SERPL-MCNC: 27 MG/DL (ref 6–20)
BUN/CREAT SERPL: 21.4 (ref 7–25)
CALCIUM SPEC-SCNC: 10.1 MG/DL (ref 8.6–10.5)
CHLORIDE SERPL-SCNC: 102 MMOL/L (ref 98–107)
CO2 SERPL-SCNC: 22 MMOL/L (ref 22–29)
CREAT SERPL-MCNC: 1.26 MG/DL (ref 0.57–1)
DEPRECATED RDW RBC AUTO: 66 FL (ref 37–54)
EGFRCR SERPLBLD CKD-EPI 2021: 49.3 ML/MIN/1.73
EOSINOPHIL # BLD AUTO: 0 10*3/MM3 (ref 0–0.4)
EOSINOPHIL NFR BLD AUTO: 0 % (ref 0.3–6.2)
ERYTHROCYTE [DISTWIDTH] IN BLOOD BY AUTOMATED COUNT: 17.2 % (ref 12.3–15.4)
GLOBULIN UR ELPH-MCNC: 2.6 GM/DL
GLUCOSE SERPL-MCNC: 125 MG/DL (ref 65–99)
HCT VFR BLD AUTO: 26.8 % (ref 34–46.6)
HGB BLD-MCNC: 8.9 G/DL (ref 12–15.9)
IMM GRANULOCYTES # BLD AUTO: 0.03 10*3/MM3 (ref 0–0.05)
IMM GRANULOCYTES NFR BLD AUTO: 0.5 % (ref 0–0.5)
LYMPHOCYTES # BLD AUTO: 0.49 10*3/MM3 (ref 0.7–3.1)
LYMPHOCYTES NFR BLD AUTO: 7.6 % (ref 19.6–45.3)
MAGNESIUM SERPL-MCNC: 2.1 MG/DL (ref 1.6–2.6)
MCH RBC QN AUTO: 34.2 PG (ref 26.6–33)
MCHC RBC AUTO-ENTMCNC: 33.2 G/DL (ref 31.5–35.7)
MCV RBC AUTO: 103.1 FL (ref 79–97)
MONOCYTES # BLD AUTO: 0.11 10*3/MM3 (ref 0.1–0.9)
MONOCYTES NFR BLD AUTO: 1.7 % (ref 5–12)
NEUTROPHILS NFR BLD AUTO: 5.85 10*3/MM3 (ref 1.7–7)
NEUTROPHILS NFR BLD AUTO: 90.2 % (ref 42.7–76)
NRBC BLD AUTO-RTO: 0 /100 WBC (ref 0–0.2)
PLATELET # BLD AUTO: 100 10*3/MM3 (ref 140–450)
PMV BLD AUTO: 9.6 FL (ref 6–12)
POTASSIUM SERPL-SCNC: 4.6 MMOL/L (ref 3.5–5.2)
PROT SERPL-MCNC: 7.2 G/DL (ref 6–8.5)
RBC # BLD AUTO: 2.6 10*6/MM3 (ref 3.77–5.28)
SODIUM SERPL-SCNC: 137 MMOL/L (ref 136–145)
WBC NRBC COR # BLD: 6.48 10*3/MM3 (ref 3.4–10.8)

## 2023-04-28 PROCEDURE — 25010000002 PEGFILGRASTIM 6 MG/0.6ML PREFILLED SYRINGE KIT: Performed by: INTERNAL MEDICINE

## 2023-04-28 PROCEDURE — 25010000002 HEPARIN LOCK FLUSH PER 10 UNITS: Performed by: INTERNAL MEDICINE

## 2023-04-28 PROCEDURE — 96413 CHEMO IV INFUSION 1 HR: CPT

## 2023-04-28 PROCEDURE — 25010000002 DEXAMETHASONE SODIUM PHOSPHATE 100 MG/10ML SOLUTION: Performed by: INTERNAL MEDICINE

## 2023-04-28 PROCEDURE — 96375 TX/PRO/DX INJ NEW DRUG ADDON: CPT

## 2023-04-28 PROCEDURE — 25010000002 CARBOPLATIN PER 50 MG: Performed by: INTERNAL MEDICINE

## 2023-04-28 PROCEDURE — 96415 CHEMO IV INFUSION ADDL HR: CPT

## 2023-04-28 PROCEDURE — 80053 COMPREHEN METABOLIC PANEL: CPT

## 2023-04-28 PROCEDURE — 25010000002 DIPHENHYDRAMINE PER 50 MG: Performed by: INTERNAL MEDICINE

## 2023-04-28 PROCEDURE — 25010000002 PALONOSETRON PER 25 MCG: Performed by: INTERNAL MEDICINE

## 2023-04-28 PROCEDURE — 25010000002 FOSAPREPITANT PER 1 MG: Performed by: INTERNAL MEDICINE

## 2023-04-28 PROCEDURE — 36415 COLL VENOUS BLD VENIPUNCTURE: CPT

## 2023-04-28 PROCEDURE — 85025 COMPLETE CBC W/AUTO DIFF WBC: CPT

## 2023-04-28 PROCEDURE — 25010000002 PACLITAXEL PER 1 MG: Performed by: INTERNAL MEDICINE

## 2023-04-28 PROCEDURE — 96367 TX/PROPH/DG ADDL SEQ IV INF: CPT

## 2023-04-28 PROCEDURE — 83735 ASSAY OF MAGNESIUM: CPT

## 2023-04-28 PROCEDURE — 96417 CHEMO IV INFUS EACH ADDL SEQ: CPT

## 2023-04-28 PROCEDURE — 96377 APPLICATON ON-BODY INJECTOR: CPT

## 2023-04-28 RX ORDER — SODIUM CHLORIDE 9 MG/ML
250 INJECTION, SOLUTION INTRAVENOUS ONCE
Status: COMPLETED | OUTPATIENT
Start: 2023-04-28 | End: 2023-04-28

## 2023-04-28 RX ORDER — DIPHENHYDRAMINE HYDROCHLORIDE 50 MG/ML
50 INJECTION INTRAMUSCULAR; INTRAVENOUS AS NEEDED
Status: DISCONTINUED | OUTPATIENT
Start: 2023-04-28 | End: 2023-04-28 | Stop reason: HOSPADM

## 2023-04-28 RX ORDER — HEPARIN SODIUM (PORCINE) LOCK FLUSH IV SOLN 100 UNIT/ML 100 UNIT/ML
500 SOLUTION INTRAVENOUS AS NEEDED
Status: DISCONTINUED | OUTPATIENT
Start: 2023-04-28 | End: 2023-04-28 | Stop reason: HOSPADM

## 2023-04-28 RX ORDER — HEPARIN SODIUM (PORCINE) LOCK FLUSH IV SOLN 100 UNIT/ML 100 UNIT/ML
500 SOLUTION INTRAVENOUS AS NEEDED
OUTPATIENT
Start: 2023-04-28

## 2023-04-28 RX ORDER — SODIUM CHLORIDE 0.9 % (FLUSH) 0.9 %
10 SYRINGE (ML) INJECTION AS NEEDED
Status: DISCONTINUED | OUTPATIENT
Start: 2023-04-28 | End: 2023-04-28 | Stop reason: HOSPADM

## 2023-04-28 RX ORDER — SODIUM CHLORIDE 0.9 % (FLUSH) 0.9 %
10 SYRINGE (ML) INJECTION AS NEEDED
OUTPATIENT
Start: 2023-04-28

## 2023-04-28 RX ORDER — PALONOSETRON 0.05 MG/ML
0.25 INJECTION, SOLUTION INTRAVENOUS ONCE
Status: COMPLETED | OUTPATIENT
Start: 2023-04-28 | End: 2023-04-28

## 2023-04-28 RX ORDER — FAMOTIDINE 10 MG/ML
20 INJECTION, SOLUTION INTRAVENOUS AS NEEDED
Status: DISCONTINUED | OUTPATIENT
Start: 2023-04-28 | End: 2023-04-28 | Stop reason: HOSPADM

## 2023-04-28 RX ORDER — FAMOTIDINE 10 MG/ML
20 INJECTION, SOLUTION INTRAVENOUS ONCE
Status: COMPLETED | OUTPATIENT
Start: 2023-04-28 | End: 2023-04-28

## 2023-04-28 RX ADMIN — PEGFILGRASTIM 6 MG: KIT SUBCUTANEOUS at 14:03

## 2023-04-28 RX ADMIN — HEPARIN SODIUM (PORCINE) LOCK FLUSH IV SOLN 100 UNIT/ML 500 UNITS: 100 SOLUTION at 14:01

## 2023-04-28 RX ADMIN — FOSAPREPITANT 150 MG: 150 INJECTION, POWDER, LYOPHILIZED, FOR SOLUTION INTRAVENOUS at 09:36

## 2023-04-28 RX ADMIN — PALONOSETRON HYDROCHLORIDE 0.25 MG: 0.25 INJECTION, SOLUTION INTRAVENOUS at 08:59

## 2023-04-28 RX ADMIN — CARBOPLATIN 510 MG: 450 INJECTION, SOLUTION INTRAVENOUS at 13:09

## 2023-04-28 RX ADMIN — DIPHENHYDRAMINE HYDROCHLORIDE 50 MG: 50 INJECTION, SOLUTION INTRAMUSCULAR; INTRAVENOUS at 09:01

## 2023-04-28 RX ADMIN — SODIUM CHLORIDE 250 ML: 9 INJECTION, SOLUTION INTRAVENOUS at 08:59

## 2023-04-28 RX ADMIN — PACLITAXEL 335 MG: 6 INJECTION, SOLUTION INTRAVENOUS at 10:10

## 2023-04-28 RX ADMIN — FAMOTIDINE 20 MG: 10 INJECTION, SOLUTION INTRAVENOUS at 08:59

## 2023-04-28 RX ADMIN — Medication 10 ML: at 14:01

## 2023-04-28 RX ADMIN — DEXAMETHASONE SODIUM PHOSPHATE 12 MG: 10 INJECTION, SOLUTION INTRAMUSCULAR; INTRAVENOUS at 09:18

## 2023-05-05 ENCOUNTER — TELEPHONE (OUTPATIENT)
Dept: ONCOLOGY | Facility: CLINIC | Age: 58
End: 2023-05-05
Payer: COMMERCIAL

## 2023-05-05 DIAGNOSIS — R79.0 LOW MAGNESIUM LEVEL: ICD-10-CM

## 2023-05-05 NOTE — TELEPHONE ENCOUNTER
----- Message from Walker Magana MD sent at 5/5/2023 11:14 AM CDT -----  Magnesium 1.7.  Take magnesium oxide 400 mg daily for 3 days.

## 2023-05-05 NOTE — TELEPHONE ENCOUNTER
Spoke with patient and she still has some magnesium at home. She was instructed to take 400 mg daily for 3 days. She will recheck on Monday in [place].

## 2023-05-10 NOTE — PROGRESS NOTES
[facility] HEMATOLOGY & ONCOLOGY [place] [id number] [facility] [address]  [facility] [address] [phone]    Patient Name: Tiff Elizalde  Encounter Date: 05/24/2023  YOB: 1965  Patient Number: [id number]      REASON FOR FOLLOW-UP: Tiff Elizalde is a pleasant 57 y.o.  female who is seen on follow-up for stage III A1 left ovarian cancer. She is seen C6D27 of adjuvant paclitaxel and carboplatin.  She had hypersensitivity reaction to paclitaxel on C1D1.  She is seen with her spouse Dennis. History is obtained from patient.  History is considered accurate.          Oncology/Hematology History Overview Note   DIAGNOSTIC ABNORMALITIES:   Pain lower back and left leg swelling.  She presented to her PCP.   CT scan done at [facility]. Details not available.  Patient seen by Dr. Virgilio Hayward 11/23/2022. Patient found to have a large pelvic mass causing bilateral hydronephrosis with resultant nonfunctioning left kidney, elevated creatinine, left deep venous thrombosis and pulmonary embolus.  Pathology report 11/28/2022.  Fallopian tube and ovary, left salpingo-oophorectomy: Fallopian tube with no evidence of involvement by malignancy.  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  No surface involvement demonstrated.  Uterus, fallopian tube and ovary, hysterectomy with right salpingo-oophorectomy: Cervix and endocervix with no evidence of squamous or glandular dysplasia.  Chronic cervicitis present.  Weakly proliferative endometrium, negative for hyperplasia, carcinoma, and endometritis.  Benign endometrial polyp present.  Adenomyosis.  No leiomyomata.  Right fallopian tube with focal involvement by serous neoplasm with Samona by this most consistent with low-grade serous carcinoma.  Remnant left ovary with adhesions to uterine serosa and focal involvement by low-grade serous carcinoma.  Right ovary with  surface involvement by low-grade serous carcinoma.  Omentum biopsy: Involved by low-grade serous carcinoma, invasive).  Sidewall, left pelvic biopsy: Edema with hemorrhage and chronic inflammation, negative for endometriosis and malignancy.  Lymph node left pelvic biopsy: 1 lymph node with a 0.9 mm focus of metastatic serous carcinoma surrounding adipose tissue with hemorrhage and edema.           PREVIOUS INTERVENTIONS:  IVC filter was placed 11/22/2022 at [place].  She had undergone exploratory laparotomy, total abdominal hysterectomy, bilateral salpingo-oophorectomy, debulking, left pelvic lymph node dissection and omentectomy by Dr. Hayward on 11/23/2022.  Estimated blood loss 400 ml.  Laparotomy showed a massive mass arising from the left ovary, retroperitoneal, infiltrating the retroperitoneal space and adherent to the sigmoid mesentery.  Within the retroperitoneum it was densely adherent to the iliac vessel on the left side and ureter.  Deliberate rupture of the mass was required in order to access the retroperitoneal attachment.  The mass was sent for frozen section and determined to be at least borderline tumor possibly low-grade serous tumor.  There was no other evidence of disease in the pelvis or upper abdomen.  Due to being densely adherent to the retroperitoneum and iliac vessels, it is unclear whether the entire tumor was removed or not and the pelvic sidewall biopsy was taken to determine whether the residual ovary was present and not a fibrotic reaction.  Bilateral ureterolysis was required.   Hypersensitivity reaction to Taxol 01/09/2023.   Adjuvant paclitaxel and carboplatin 01/10/2023 through 4/28/2023, 6 cycles.      Ovarian cancer   12/15/2022 Initial Diagnosis    Ovarian cancer (HCC)       1/9/2023 -  Chemotherapy    OP OVARIAN PACLitaxel / CARBOplatin (Q21D)       1/9/2023 -  Chemotherapy    OP CENTRAL VENOUS ACCESS DEVICE ACCESS, CARE, AND MAINTENANCE (CVAD)       1/20/2023 Cancer Staged     Staging form: Ovary, Fallopian Tube, And Primary Peritoneal Carcinoma, AJCC 8th Edition  - Pathologic stage from 1/20/2023: FIGO Stage III (pT3, pN1, cM0) - Signed by Walker Magana MD on 1/20/2023       2/15/2023 - 2/15/2023 Chemotherapy    OP MAGNESIUM            PAST MEDICAL HISTORY:  ALLERGIES:  No Known Allergies  CURRENT MEDICATIONS:  Outpatient Encounter Medications as of 5/24/2023   Medication Sig Dispense Refill    Calcium Carbonate-Vit D-Min (CALCIUM 1200 PO) Take  by mouth.      dexamethasone (DECADRON) 4 MG tablet Take 1 pill night before chemo and morning of chemo. 10 tablet 2    Eliquis 5 MG tablet tablet 1 tablet Every 12 (Twelve) Hours.      ferrous sulfate 325 (65 FE) MG tablet Take 1 tablet by mouth Daily With Breakfast. 60 tablet 2    HYDROcodone-acetaminophen (NORCO) 7.5-325 MG per tablet Take 1 tablet by mouth Every 4 (Four) Hours As Needed for Moderate Pain (Pain). 15 tablet 0    lidocaine-prilocaine (EMLA) 2.5-2.5 % cream Apply to port site 30 minutes prior to being accessed. 30 g 0    lisinopril-hydrochlorothiazide (PRINZIDE,ZESTORETIC) 20-12.5 MG per tablet Take 1 tablet by mouth Daily.      magnesium oxide (MAG-OX) 400 MG tablet Take 1 tablet by mouth 2 (Two) Times a Day. 6 tablet 0    ondansetron (Zofran) 8 MG tablet Take 1 tablet by mouth Every 8 (Eight) Hours As Needed for Nausea or Vomiting. 60 tablet 2    prochlorperazine (COMPAZINE) 10 MG tablet Take 1 tablet by mouth Every 6 (Six) Hours As Needed for Nausea. 60 tablet 2    vitamin D3 125 MCG (5000 UT) capsule capsule Take  by mouth Daily.       No facility-administered encounter medications on file as of 5/24/2023.     ADULT ILLNESSES:  Patient Active Problem List   Diagnosis Code    Ovarian cancer C56.9     SURGERIES:  Past Surgical History:   Procedure Laterality Date    VENA CAVA FILTER INSERTION      VENOUS ACCESS DEVICE (PORT) INSERTION N/A 12/30/2022    Procedure: INSERTION VENOUS ACCESS DEVICE;  Surgeon: Holley Mcgill,  "MD;  Location: [facility] OR;  Service: General;  Laterality: N/A;     HEALTH MAINTENANCE ITEMS:  Health Maintenance Due   Topic Date Due    MAMMOGRAM  Never done    COLORECTAL CANCER SCREENING  Never done    TDAP/TD VACCINES (2 - Tdap) 02/24/2008    ZOSTER VACCINE (1 of 2) Never done    COVID-19 Vaccine (4 - Booster for Moderna series) 03/24/2022    HEPATITIS C SCREENING  Never done    ANNUAL PHYSICAL  Never done    PAP SMEAR  Never done       <no information>  Last Completed Colonoscopy       This patient has no relevant Health Maintenance data.            There is no immunization history on file for this patient.  Last Completed Mammogram       This patient has no relevant Health Maintenance data.              FAMILY HISTORY:  No family history on file.  SOCIAL HISTORY:  Social History     Socioeconomic History    Marital status:    Tobacco Use    Smoking status: Never   Vaping Use    Vaping Use: Never used   Substance and Sexual Activity    Alcohol use: Never    Drug use: Never    Sexual activity: Defer       REVIEW OF SYSTEMS:    Review of Systems   Constitutional:  Positive for fatigue. Negative for fever and unexpected weight change.        \"Feeling much better. We went fishing.\"   HENT:  Negative for congestion and mouth sores.    Eyes:  Negative for redness and visual disturbance.   Respiratory:  Negative for shortness of breath and wheezing.    Cardiovascular:  Negative for chest pain and leg swelling.   Gastrointestinal:  Negative for abdominal pain, constipation, diarrhea, nausea and vomiting.   Endocrine: Negative for polydipsia and polyphagia.   Genitourinary:  Negative for difficulty urinating, dysuria and flank pain.   Musculoskeletal:  Negative for gait problem and joint swelling.   Skin:  Positive for pallor.   Allergic/Immunologic: Negative for food allergies.   Neurological:  Negative for dizziness and weakness.        \"Toes are tingling.\"   Hematological:  Negative for adenopathy. Does not " "bruise/bleed easily.   Psychiatric/Behavioral:  Negative for agitation, confusion and hallucinations.      VITAL SIGNS: /80   Pulse 74   Temp 97.8 °F (36.6 °C)   Resp 18   Ht 170.2 cm (67\")   Wt 79.7 kg (175 lb 9.6 oz)   SpO2 98%   Breastfeeding No   BMI 27.50 kg/m² \"I am nervous.\"  Pain Score    05/24/23 0824   PainSc: 0-No pain       PHYSICAL EXAMINATION:     Physical Exam  Vitals reviewed.   Constitutional:       General: She is not in acute distress.  HENT:      Head: Normocephalic and atraumatic.   Eyes:      General: No scleral icterus.  Cardiovascular:      Rate and Rhythm: Normal rate.   Pulmonary:      Effort: No respiratory distress.      Breath sounds: No wheezing or rales.      Comments: Port, left. No erythema.  Abdominal:      General: Bowel sounds are normal.      Palpations: Abdomen is soft.      Tenderness: There is no abdominal tenderness.   Musculoskeletal:         General: No swelling.      Cervical back: Neck supple.   Skin:     General: Skin is warm.      Coloration: Skin is pale.   Neurological:      Mental Status: She is alert and oriented to person, place, and time.   Psychiatric:         Mood and Affect: Mood normal.         Behavior: Behavior normal.         Thought Content: Thought content normal.         Judgment: Judgment normal.       LABS    Lab Results - Last 18 Months   Lab Units 04/28/23  0754 04/07/23  0831 03/15/23  1258 02/24/23  0757 02/03/23  0815 01/09/23  0845   HEMOGLOBIN g/dL 8.9* 9.1* 9.4* 10.1* 11.5* 11.8*   HEMATOCRIT % 26.8* 27.4* 28.6* 30.9* 36.2 37.6   MCV fL 103.1* 96.8 92.6 89.3 89.4 89.7   WBC 10*3/mm3 6.48 6.85 7.13 9.62 8.17 8.55   RDW % 17.2* 19.3* 17.8* 16.0* 14.5 13.2   MPV fL 9.6 9.4 9.4 10.1 9.3 9.9   PLATELETS 10*3/mm3 100* 104* 110* 140 330 282   IMM GRAN % % 0.5 0.4 0.3 0.3 0.4 0.4   NEUTROS ABS 10*3/mm3 5.85 5.79 4.48 8.21* 7.12* 6.02   LYMPHS ABS 10*3/mm3 0.49* 0.70 1.97 0.88 0.72 1.68   MONOS ABS 10*3/mm3 0.11 0.32 0.63 0.49 0.29 0.68 "   EOS ABS 10*3/mm3 0.00 0.00 0.01 0.00 0.00 0.09   BASOS ABS 10*3/mm3 0.00 0.01 0.02 0.01 0.01 0.05   IMMATURE GRANS (ABS) 10*3/mm3 0.03 0.03 0.02 0.03 0.03 0.03   NRBC /100 WBC 0.0 0.0 0.0 0.0 0.0 0.0       Lab Results - Last 18 Months   Lab Units 05/12/23  1444 04/28/23  0754 04/07/23  0831 03/15/23  1257 02/24/23  0757 02/03/23  0815 01/09/23  0845   GLUCOSE mg/dL  --  125* 112* 83 140* 130* 90   SODIUM mmol/L  --  137 137 140 138 138 142   POTASSIUM mmol/L  --  4.6 4.2 4.2 4.3 4.6 4.1   CO2 mmol/L  --  22.0 24.0 25.0 23.0 25.0 29.0   CHLORIDE mmol/L  --  102 101 103 103 104 105   ANION GAP mmol/L  --  13.0 12.0 12.0 12.0 9.0 8.0   CREATININE mg/dL 1.40* 1.26* 1.15* 1.13* 1.06* 1.08* 1.37*   BUN mg/dL  --  27* 25* 19 24* 23* 17   BUN / CREAT RATIO   --  21.4 21.7 16.8 22.6 21.3 12.4   CALCIUM mg/dL  --  10.1 10.0 9.8 10.1 9.9 9.9   ALK PHOS U/L  --  84 88 82 86 80 71   TOTAL PROTEIN g/dL  --  7.2 7.2 6.9 7.1 7.7 7.4   ALT (SGPT) U/L  --  15 21 21 22 13 10   AST (SGOT) U/L  --  15 18 18 18 13 14   BILIRUBIN mg/dL  --  0.3 0.2 0.2 0.3 0.2 0.4   ALBUMIN g/dL  --  4.6 4.3 4.4 4.4 4.6 4.3   GLOBULIN gm/dL  --  2.6 2.9 2.5 2.7 3.1 3.1       No results for input(s): MSPIKE, KAPPALAMB, IGLFLC, URICACID, FREEKAPPAL, CEA, LDH, REFLABREPO in the last 82566 hours.    Lab Results - Last 18 Months   Lab Units 01/09/23  0845   IRON mcg/dL 35*   TIBC mcg/dL 308   IRON SATURATION % 11*   FERRITIN ng/mL 379.80*       Tiff Elizalde reports a pain score of 0.          ASSESSMENT:  1.  Ovarian cancer, low-grade serous carcinoma, left.  Tumor size 18 cm  AJCC stage:IIIA1 (pT3, pN1, cM0, G1)  Treatment status: Adjuvant Taxol and carboplatin 01/10/2023 through 4/28/2023, 6 cycles  2.  Performance status of 1.  3.  Left deep venous thrombosis from malignancy.  Undergoing treatment with apixaban.  4.  Pulmonary embolism from malignancy.  Undergoing treatment with apixaban.  Post IVC filter 11/22/2022.  5.  Anemia from iron deficiency and  "chronic kidney disease stage IIIa, GFR 59.3 ml/min on 2/3/2023. Taking oral iron 01/10/2023 through present.    6.  Hypersensitivity reaction to paclitaxel on 01/09/2023. Responded to Decadron as premed.   7.  Grade 1 neuropathy. On observation. Gabapentin on hold.    8.  RIGHT lower lobe 3 mm pulmonary nodule. Observe.  9.  RIGHT thyroid with a 4 cm heterogeneous nodule. Per PCP.  10. Nodule located in the segment 4 a of the liver measures 7 mm on 5/12/2023, await MRI abdomen.       PLAN:  1.   Re: Tolerance to chemo \"Wasn't as bad. Nausea for 30 minutes.\"  2.   Re: Tolerance to oral iron.  \"No problem\"  3.   Re: Heme status.  WBC 4.9, hemoglobin 10, hematocrit 29.8, .2 and platelet 190.   4.   Re: CMP.  GFR 45 mL per minute   5.   Re: Magnesium 1.9.  at 43.3. Observe, negative CT scan 5/12/2023.   6.   Re: Venous Doppler report 5/12/2023. There is evidence of partial deep venous thrombosis of the left lower extremity.  7.   Re: CT chest report 5/12/2023.  RIGHT lower lobe 3 mm pulmonary nodule.  RIGHT thyroid with a 4 cm heterogeneous nodule.   8.   Re: CT abdomen and pelvis report 5/12/2023. No acute abnormality of the abdomen or pelvis.  Low-density nodule in the liver. The larger nodule appears to be a cyst. However the smaller nodule in the left lobe as a noncystic CT density and there are intrinsic septation. This may be further evaluated with contrast enhanced CT scan or MR imaging of the abdomen with renal mass protocol.  IVC filter seen. The proximal end of the filter is asymmetrical and appears to be in the right lateral wall of the IVC. The exact anatomical location is not certain.  9.   Re:  Stable for observation, ovarian cancer.   10.  Plan to remove temporary IVC in the future.   11.  Order MRI of the abdomen to assess the nodule located in the segment 4 a of the liver measures 7 mm.   12.  eRx for Zofran 8 mg p.o. every 8 " "hours as needed for nausea and vomiting #60, 2 refills if needed.  13.  eRx Ferrous sulfate 325 mg p.o. daily #60 with 2 refills.  Stop and call if intolerant. Monitor for nausea, vomiting or abdominal pain.  14.  Flush port every 6 weeks.  15.  eRx gabapentin 300 mg po three times daily, 90, 2 refills if needed.  Monitor for somnolence, dizziness or hypotension.  16.  Continue ongoing management per primary care physician and other specialists.  17.  Plan of care discussed with patient and spouse Dennis.  Understanding expressed.  Patient agreeable to proceed.  18.  Questions were answered to her satisfaction. \"Yes.\"  19.  She will be seen every 2 to 4 months for the first 2 years then every 3 to 6 months for the next 3 years.  Annually after 5 years.  Imaging as clinically indicated.  20.  CBC with differential, ferritin and iron panel in 4 weeks.   21.  Refer to genetic counselor.  22.  Return to office in 12 weeks with CBC with differential, CMP, , and left leg venous doppler.          I have reviewed the assessment and plan and verified the accuracy of it. No changes to assessment and plan since the information was documented. Walker Magana MD 05/24/23         I spent 36 total minutes, face-to-face, caring for Tiff today.  Greater than 50% of this time involved counseling and/or coordination of care as documented within this note regarding the patient's illness(es), pros and cons of various treatment options, instructions and/or risk reduction.             (Holley Mcgill MD)  MD Tyrone Galindo MD  "

## 2023-05-12 ENCOUNTER — HOSPITAL ENCOUNTER (OUTPATIENT)
Dept: CT IMAGING | Facility: HOSPITAL | Age: 58
Discharge: HOME | End: 2023-05-12
Payer: COMMERCIAL

## 2023-05-12 ENCOUNTER — HOSPITAL ENCOUNTER (OUTPATIENT)
Dept: ULTRASOUND IMAGING | Facility: HOSPITAL | Age: 58
Discharge: HOME | End: 2023-05-12
Payer: COMMERCIAL

## 2023-05-12 DIAGNOSIS — I82.4Z2 DVT, LOWER EXTREMITY, DISTAL, ACUTE, LEFT: ICD-10-CM

## 2023-05-12 DIAGNOSIS — C56.2 MALIGNANT NEOPLASM OF LEFT OVARY: ICD-10-CM

## 2023-05-12 LAB — CREAT BLDA-MCNC: 1.4 MG/DL (ref 0.6–1.3)

## 2023-05-12 PROCEDURE — 93971 EXTREMITY STUDY: CPT

## 2023-05-12 PROCEDURE — 82565 ASSAY OF CREATININE: CPT

## 2023-05-12 PROCEDURE — 25510000001 IOPAMIDOL 61 % SOLUTION: Performed by: INTERNAL MEDICINE

## 2023-05-12 PROCEDURE — 71260 CT THORAX DX C+: CPT

## 2023-05-12 PROCEDURE — 74177 CT ABD & PELVIS W/CONTRAST: CPT

## 2023-05-12 RX ADMIN — IOPAMIDOL 100 ML: 612 INJECTION, SOLUTION INTRAVENOUS at 15:09

## 2023-05-19 ENCOUNTER — TELEPHONE (OUTPATIENT)
Dept: ONCOLOGY | Facility: CLINIC | Age: 58
End: 2023-05-19
Payer: COMMERCIAL

## 2023-05-19 NOTE — TELEPHONE ENCOUNTER
----- Message from Walker Magana MD sent at 5/12/2023  3:21 PM CDT -----  There is evidence of partial deep venous thrombosis of the  left lower extremity.  Continue apixaban.

## 2023-05-23 ENCOUNTER — TELEPHONE (OUTPATIENT)
Dept: ONCOLOGY | Facility: CLINIC | Age: 58
End: 2023-05-23

## 2023-05-23 NOTE — TELEPHONE ENCOUNTER
Caller: Tere Elizalde    Relationship: Self    Best call back number: [phone]    What is the best time to reach you: ANY    Who are you requesting to speak with (clinical staff, provider,  specific staff member): CLINICAL/ALVARO    What was the call regarding: TERE STATED SHE WANTED TO TALK TO ALVARO REGARDING HER APPOINTMENT  TOMORROW    Do you require a callback: YES

## 2023-05-24 ENCOUNTER — OFFICE VISIT (OUTPATIENT)
Dept: ONCOLOGY | Facility: CLINIC | Age: 58
End: 2023-05-24
Payer: COMMERCIAL

## 2023-05-24 VITALS
TEMPERATURE: 97.8 F | DIASTOLIC BLOOD PRESSURE: 80 MMHG | BODY MASS INDEX: 27.56 KG/M2 | HEIGHT: 67 IN | OXYGEN SATURATION: 98 % | SYSTOLIC BLOOD PRESSURE: 152 MMHG | WEIGHT: 175.6 LBS | HEART RATE: 74 BPM | RESPIRATION RATE: 18 BRPM

## 2023-05-24 DIAGNOSIS — D50.8 IRON DEFICIENCY ANEMIA SECONDARY TO INADEQUATE DIETARY IRON INTAKE: ICD-10-CM

## 2023-05-24 DIAGNOSIS — C56.2 MALIGNANT NEOPLASM OF LEFT OVARY: Primary | ICD-10-CM

## 2023-05-24 DIAGNOSIS — K76.9 LESION OF LIVER: ICD-10-CM

## 2023-05-24 RX ORDER — FERROUS SULFATE 325(65) MG
325 TABLET ORAL
Qty: 60 TABLET | Refills: 2 | Status: SHIPPED | OUTPATIENT
Start: 2023-05-24 | End: 2023-05-24 | Stop reason: SDUPTHER

## 2023-05-24 RX ORDER — FERROUS SULFATE 325(65) MG
325 TABLET ORAL
Qty: 60 TABLET | Refills: 2 | Status: SHIPPED | OUTPATIENT
Start: 2023-05-24

## 2023-05-24 NOTE — LETTER
May 24, 2023       No Recipients    Patient: Tiff Elizalde   YOB: 1965   Date of Visit: 5/24/2023       Dear Dr. Haines Recipients:    Thank you for referring Tiff Elizalde to me for evaluation. Below are the relevant portions of my assessment and plan of care.    If you have questions, please do not hesitate to call me. I look forward to following Tiff along with you.         Sincerely,        Walker Magana MD        CC:   No Recipients    Walker Magana MD  05/24/23 0849  Sign when Signing Visit  [facility] HEMATOLOGY & ONCOLOGY [address]  [facility] [id number] [phone]    Patient Name: Tiff Elizalde  Encounter Date: 05/24/2023  YOB: 1965  Patient Number: [id number]      REASON FOR FOLLOW-UP: Tiff Elizalde is a pleasant 57 y.o.  female who is seen on follow-up for stage III A1 left ovarian cancer. She is seen C6D27 of adjuvant paclitaxel and carboplatin.  She had hypersensitivity reaction to paclitaxel on C1D1.  She is seen with her spouse Dennis. History is obtained from patient.  History is considered accurate.          Oncology/Hematology History Overview Note   DIAGNOSTIC ABNORMALITIES:   Pain lower back and left leg swelling.  She presented to her PCP.   CT scan done at [facility]. Details not available.  Patient seen by Dr. Virgilio Hayward 11/23/2022. Patient found to have a large pelvic mass causing bilateral hydronephrosis with resultant nonfunctioning left kidney, elevated creatinine, left deep venous thrombosis and pulmonary embolus.  Pathology report 11/28/2022.  Fallopian tube and ovary, left salpingo-oophorectomy: Fallopian tube with no evidence of involvement by malignancy.  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  No surface involvement demonstrated.  Uterus, fallopian tube and ovary, hysterectomy with right salpingo-oophorectomy: Cervix  and endocervix with no evidence of squamous or glandular dysplasia.  Chronic cervicitis present.  Weakly proliferative endometrium, negative for hyperplasia, carcinoma, and endometritis.  Benign endometrial polyp present.  Adenomyosis.  No leiomyomata.  Right fallopian tube with focal involvement by serous neoplasm with Samona by this most consistent with low-grade serous carcinoma.  Remnant left ovary with adhesions to uterine serosa and focal involvement by low-grade serous carcinoma.  Right ovary with surface involvement by low-grade serous carcinoma.  Omentum biopsy: Involved by low-grade serous carcinoma, invasive).  Sidewall, left pelvic biopsy: Edema with hemorrhage and chronic inflammation, negative for endometriosis and malignancy.  Lymph node left pelvic biopsy: 1 lymph node with a 0.9 mm focus of metastatic serous carcinoma surrounding adipose tissue with hemorrhage and edema.           PREVIOUS INTERVENTIONS:  IVC filter was placed 11/22/2022 at [place].  She had undergone exploratory laparotomy, total abdominal hysterectomy, bilateral salpingo-oophorectomy, debulking, left pelvic lymph node dissection and omentectomy by Dr. Hayward on 11/23/2022.  Estimated blood loss 400 ml.  Laparotomy showed a massive mass arising from the left ovary, retroperitoneal, infiltrating the retroperitoneal space and adherent to the sigmoid mesentery.  Within the retroperitoneum it was densely adherent to the iliac vessel on the left side and ureter.  Deliberate rupture of the mass was required in order to access the retroperitoneal attachment.  The mass was sent for frozen section and determined to be at least borderline tumor possibly low-grade serous tumor.  There was no other evidence of disease in the pelvis or upper abdomen.  Due to being densely adherent to the retroperitoneum and iliac vessels, it is unclear whether the entire tumor was removed or not and the pelvic sidewall biopsy was taken to determine whether  the residual ovary was present and not a fibrotic reaction.  Bilateral ureterolysis was required.   Hypersensitivity reaction to Taxol 01/09/2023.   Adjuvant paclitaxel and carboplatin 01/10/2023 through 4/28/2023, 6 cycles.      Ovarian cancer   12/15/2022 Initial Diagnosis    Ovarian cancer (HCC)       1/9/2023 -  Chemotherapy    OP OVARIAN PACLitaxel / CARBOplatin (Q21D)       1/9/2023 -  Chemotherapy    OP CENTRAL VENOUS ACCESS DEVICE ACCESS, CARE, AND MAINTENANCE (CVAD)       1/20/2023 Cancer Staged    Staging form: Ovary, Fallopian Tube, And Primary Peritoneal Carcinoma, AJCC 8th Edition  - Pathologic stage from 1/20/2023: FIGO Stage III (pT3, pN1, cM0) - Signed by Walker Magana MD on 1/20/2023       2/15/2023 - 2/15/2023 Chemotherapy    OP MAGNESIUM            PAST MEDICAL HISTORY:  ALLERGIES:  No Known Allergies  CURRENT MEDICATIONS:  Outpatient Encounter Medications as of 5/24/2023   Medication Sig Dispense Refill   • Calcium Carbonate-Vit D-Min (CALCIUM 1200 PO) Take  by mouth.     • dexamethasone (DECADRON) 4 MG tablet Take 1 pill night before chemo and morning of chemo. 10 tablet 2   • Eliquis 5 MG tablet tablet 1 tablet Every 12 (Twelve) Hours.     • ferrous sulfate 325 (65 FE) MG tablet Take 1 tablet by mouth Daily With Breakfast. 60 tablet 2   • HYDROcodone-acetaminophen (NORCO) 7.5-325 MG per tablet Take 1 tablet by mouth Every 4 (Four) Hours As Needed for Moderate Pain (Pain). 15 tablet 0   • lidocaine-prilocaine (EMLA) 2.5-2.5 % cream Apply to port site 30 minutes prior to being accessed. 30 g 0   • lisinopril-hydrochlorothiazide (PRINZIDE,ZESTORETIC) 20-12.5 MG per tablet Take 1 tablet by mouth Daily.     • magnesium oxide (MAG-OX) 400 MG tablet Take 1 tablet by mouth 2 (Two) Times a Day. 6 tablet 0   • ondansetron (Zofran) 8 MG tablet Take 1 tablet by mouth Every 8 (Eight) Hours As Needed for Nausea or Vomiting. 60 tablet 2   • prochlorperazine (COMPAZINE) 10 MG tablet Take 1 tablet by mouth  "Every 6 (Six) Hours As Needed for Nausea. 60 tablet 2   • vitamin D3 125 MCG (5000 UT) capsule capsule Take  by mouth Daily.       No facility-administered encounter medications on file as of 5/24/2023.     ADULT ILLNESSES:  Patient Active Problem List   Diagnosis Code   • Ovarian cancer C56.9     SURGERIES:  Past Surgical History:   Procedure Laterality Date   • VENA CAVA FILTER INSERTION     • VENOUS ACCESS DEVICE (PORT) INSERTION N/A 12/30/2022    Procedure: INSERTION VENOUS ACCESS DEVICE;  Surgeon: Holley Mcgill MD;  Location: [facility];  Service: General;  Laterality: N/A;     HEALTH MAINTENANCE ITEMS:  Health Maintenance Due   Topic Date Due   • MAMMOGRAM  Never done   • COLORECTAL CANCER SCREENING  Never done   • TDAP/TD VACCINES (2 - Tdap) 02/24/2008   • ZOSTER VACCINE (1 of 2) Never done   • COVID-19 Vaccine (4 - Booster for Moderna series) 03/24/2022   • HEPATITIS C SCREENING  Never done   • ANNUAL PHYSICAL  Never done   • PAP SMEAR  Never done       <no information>  Last Completed Colonoscopy       This patient has no relevant Health Maintenance data.            There is no immunization history on file for this patient.  Last Completed Mammogram       This patient has no relevant Health Maintenance data.              FAMILY HISTORY:  No family history on file.  SOCIAL HISTORY:  Social History     Socioeconomic History   • Marital status:    Tobacco Use   • Smoking status: Never   Vaping Use   • Vaping Use: Never used   Substance and Sexual Activity   • Alcohol use: Never   • Drug use: Never   • Sexual activity: Defer       REVIEW OF SYSTEMS:    Review of Systems   Constitutional:  Positive for fatigue. Negative for fever and unexpected weight change.        \"Feeling much better. We went fishing.\"   HENT:  Negative for congestion and mouth sores.    Eyes:  Negative for redness and visual disturbance.   Respiratory:  Negative for shortness of breath and wheezing.    Cardiovascular:  Negative for " "chest pain and leg swelling.   Gastrointestinal:  Negative for abdominal pain, constipation, diarrhea, nausea and vomiting.   Endocrine: Negative for polydipsia and polyphagia.   Genitourinary:  Negative for difficulty urinating, dysuria and flank pain.   Musculoskeletal:  Negative for gait problem and joint swelling.   Skin:  Positive for pallor.   Allergic/Immunologic: Negative for food allergies.   Neurological:  Negative for dizziness and weakness.        \"Toes are tingling.\"   Hematological:  Negative for adenopathy. Does not bruise/bleed easily.   Psychiatric/Behavioral:  Negative for agitation, confusion and hallucinations.      VITAL SIGNS: /80   Pulse 74   Temp 97.8 °F (36.6 °C)   Resp 18   Ht 170.2 cm (67\")   Wt 79.7 kg (175 lb 9.6 oz)   SpO2 98%   Breastfeeding No   BMI 27.50 kg/m² \"I am nervous.\"  Pain Score    05/24/23 0824   PainSc: 0-No pain       PHYSICAL EXAMINATION:     Physical Exam  Vitals reviewed.   Constitutional:       General: She is not in acute distress.  HENT:      Head: Normocephalic and atraumatic.   Eyes:      General: No scleral icterus.  Cardiovascular:      Rate and Rhythm: Normal rate.   Pulmonary:      Effort: No respiratory distress.      Breath sounds: No wheezing or rales.      Comments: Port, left. No erythema.  Abdominal:      General: Bowel sounds are normal.      Palpations: Abdomen is soft.      Tenderness: There is no abdominal tenderness.   Musculoskeletal:         General: No swelling.      Cervical back: Neck supple.   Skin:     General: Skin is warm.      Coloration: Skin is pale.   Neurological:      Mental Status: She is alert and oriented to person, place, and time.   Psychiatric:         Mood and Affect: Mood normal.         Behavior: Behavior normal.         Thought Content: Thought content normal.         Judgment: Judgment normal.       LABS    Lab Results - Last 18 Months   Lab Units 04/28/23  0754 04/07/23  0831 03/15/23  1258 02/24/23  0757 " 02/03/23  0815 01/09/23  0845   HEMOGLOBIN g/dL 8.9* 9.1* 9.4* 10.1* 11.5* 11.8*   HEMATOCRIT % 26.8* 27.4* 28.6* 30.9* 36.2 37.6   MCV fL 103.1* 96.8 92.6 89.3 89.4 89.7   WBC 10*3/mm3 6.48 6.85 7.13 9.62 8.17 8.55   RDW % 17.2* 19.3* 17.8* 16.0* 14.5 13.2   MPV fL 9.6 9.4 9.4 10.1 9.3 9.9   PLATELETS 10*3/mm3 100* 104* 110* 140 330 282   IMM GRAN % % 0.5 0.4 0.3 0.3 0.4 0.4   NEUTROS ABS 10*3/mm3 5.85 5.79 4.48 8.21* 7.12* 6.02   LYMPHS ABS 10*3/mm3 0.49* 0.70 1.97 0.88 0.72 1.68   MONOS ABS 10*3/mm3 0.11 0.32 0.63 0.49 0.29 0.68   EOS ABS 10*3/mm3 0.00 0.00 0.01 0.00 0.00 0.09   BASOS ABS 10*3/mm3 0.00 0.01 0.02 0.01 0.01 0.05   IMMATURE GRANS (ABS) 10*3/mm3 0.03 0.03 0.02 0.03 0.03 0.03   NRBC /100 WBC 0.0 0.0 0.0 0.0 0.0 0.0       Lab Results - Last 18 Months   Lab Units 05/12/23  1444 04/28/23  0754 04/07/23  0831 03/15/23  1257 02/24/23  0757 02/03/23  0815 01/09/23  0845   GLUCOSE mg/dL  --  125* 112* 83 140* 130* 90   SODIUM mmol/L  --  137 137 140 138 138 142   POTASSIUM mmol/L  --  4.6 4.2 4.2 4.3 4.6 4.1   CO2 mmol/L  --  22.0 24.0 25.0 23.0 25.0 29.0   CHLORIDE mmol/L  --  102 101 103 103 104 105   ANION GAP mmol/L  --  13.0 12.0 12.0 12.0 9.0 8.0   CREATININE mg/dL 1.40* 1.26* 1.15* 1.13* 1.06* 1.08* 1.37*   BUN mg/dL  --  27* 25* 19 24* 23* 17   BUN / CREAT RATIO   --  21.4 21.7 16.8 22.6 21.3 12.4   CALCIUM mg/dL  --  10.1 10.0 9.8 10.1 9.9 9.9   ALK PHOS U/L  --  84 88 82 86 80 71   TOTAL PROTEIN g/dL  --  7.2 7.2 6.9 7.1 7.7 7.4   ALT (SGPT) U/L  --  15 21 21 22 13 10   AST (SGOT) U/L  --  15 18 18 18 13 14   BILIRUBIN mg/dL  --  0.3 0.2 0.2 0.3 0.2 0.4   ALBUMIN g/dL  --  4.6 4.3 4.4 4.4 4.6 4.3   GLOBULIN gm/dL  --  2.6 2.9 2.5 2.7 3.1 3.1       No results for input(s): MSPIKE, KAPPALAMB, IGLFLC, URICACID, FREEKAPPAL, CEA, LDH, REFLABREPO in the last 74473 hours.    Lab Results - Last 18 Months   Lab Units 01/09/23  0845   IRON mcg/dL 35*   TIBC mcg/dL 308   IRON SATURATION % 11*   FERRITIN ng/mL  "379.80*       Tiff Elizalde reports a pain score of 0.          ASSESSMENT:  1.  Ovarian cancer, low-grade serous carcinoma, left.  Tumor size 18 cm  AJCC stage:IIIA1 (pT3, pN1, cM0, G1)  Treatment status: Adjuvant Taxol and carboplatin 01/10/2023 through 4/28/2023, 6 cycles  2.  Performance status of 1.  3.  Left deep venous thrombosis from malignancy.  Undergoing treatment with apixaban.  4.  Pulmonary embolism from malignancy.  Undergoing treatment with apixaban.  Post IVC filter 11/22/2022.  5.  Anemia from iron deficiency and chronic kidney disease stage IIIa, GFR 59.3 ml/min on 2/3/2023. Taking oral iron 01/10/2023 through present.    6.  Hypersensitivity reaction to paclitaxel on 01/09/2023. Responded to Decadron as premed.   7.  Grade 1 neuropathy. On observation. Gabapentin on hold.    8.  RIGHT lower lobe 3 mm pulmonary nodule. Observe.  9.  RIGHT thyroid with a 4 cm heterogeneous nodule. Per PCP.  10. Nodule located in the segment 4 a of the liver measures 7 mm on 5/12/2023, await MRI abdomen.       PLAN:  1.   Re: Tolerance to chemo \"Wasn't as bad. Nausea for 30 minutes.\"  2.   Re: Tolerance to oral iron.  \"No problem\"  3.   Re: Heme status.  WBC 4.9, hemoglobin 10, hematocrit 29.8, .2 and platelet 190.   4.   Re: CMP.  GFR 45 mL per minute   5.   Re: Magnesium 1.9.  at 43.3. Observe, negative CT scan 5/12/2023.   6.   Re: Venous Doppler report 5/12/2023. There is evidence of partial deep venous thrombosis of the left lower extremity.  7.   Re: CT chest report 5/12/2023.  RIGHT lower lobe 3 mm pulmonary nodule.  RIGHT thyroid with a 4 cm heterogeneous nodule.   8.   Re: CT abdomen and pelvis report 5/12/2023. No acute abnormality of the abdomen or pelvis.  Low-density nodule in the liver. The larger nodule appears to be a cyst. However the smaller nodule in the left lobe as a noncystic CT density and there are intrinsic septation. This may " "be further evaluated with contrast enhanced CT scan or MR imaging of the abdomen with renal mass protocol.  IVC filter seen. The proximal end of the filter is asymmetrical and appears to be in the right lateral wall of the IVC. The exact anatomical location is not certain.  9.   Re:  Stable for observation, ovarian cancer.   10.  Plan to remove temporary IVC in the future.   11.  Order MRI of the abdomen to assess the nodule located in the segment 4 a of the liver measures 7 mm.   12.  eRx for Zofran 8 mg p.o. every 8 hours as needed for nausea and vomiting #60, 2 refills if needed.  13.  eRx Ferrous sulfate 325 mg p.o. daily #60 with 2 refills.  Stop and call if intolerant. Monitor for nausea, vomiting or abdominal pain.  14.  Flush port every 6 weeks.  15.  eRx gabapentin 300 mg po three times daily, 90, 2 refills if needed.  Monitor for somnolence, dizziness or hypotension.  16.  Continue ongoing management per primary care physician and other specialists.  17.  Plan of care discussed with patient and spouse Dennis.  Understanding expressed.  Patient agreeable to proceed.  18.  Questions were answered to her satisfaction. \"Yes.\"  19.  She will be seen every 2 to 4 months for the first 2 years then every 3 to 6 months for the next 3 years.  Annually after 5 years.  Imaging as clinically indicated.  20.  CBC with differential, ferritin and iron panel in 4 weeks.   21.  Refer to genetic counselor.  22.  Return to office in 12 weeks with CBC with differential, CMP, , and left leg venous doppler.          I have reviewed the assessment and plan and verified the accuracy of it. No changes to assessment and plan since the information was documented. Walker Magana MD 05/24/23         I spent 36 total minutes, face-to-face, caring for Tiff today.  Greater than 50% of this time involved counseling and/or coordination of care as documented within this note regarding the patient's illness(es), pros and cons of " various treatment options, instructions and/or risk reduction.             (Holley Mcgill MD)  MD Tyrone Galindo MD

## 2023-05-30 ENCOUNTER — CLINICAL SUPPORT (OUTPATIENT)
Dept: GENETICS | Facility: HOSPITAL | Age: 58
End: 2023-05-30

## 2023-05-30 DIAGNOSIS — Z80.42 FAMILY HISTORY OF PROSTATE CANCER: ICD-10-CM

## 2023-05-30 DIAGNOSIS — C56.2 MALIGNANT NEOPLASM OF LEFT OVARY: ICD-10-CM

## 2023-05-30 DIAGNOSIS — Z13.79 GENETIC TESTING: Primary | ICD-10-CM

## 2023-05-30 NOTE — PROGRESS NOTES
Tiff Elizalde, a 57-year-old female, was seen for genetic counseling due to a personal history of ovarian cancer. Genetic counseling was provided via telephone. Ms. Elizalde confirmed her name, date of birth, and that she was in [place] at the time of the appointment. She was recently diagnosed with ovarian cancer at age 57. She had a complete hysterectomy in November and has had chemotherapy. Ms. Elizalde’s most recent mammogram was about 2 years ago and was normal. She was 10 years old at menarche and had her first child at age 20. She has not yet had a colonoscopy but has done some colon cancer screening at her doctor’s office. Ms. Elizalde was interested in discussing her risk for a hereditary cancer syndrome. She decided to pursue comprehensive genetic testing to evaluate her risk of cancer, therefore the CancerNext Panel was ordered through [employer] which analyzes 36 genes associated with an increased cancer risk. [facility] will mail a saliva kit directly to her home. Results are expected in 2-3 weeks after the sample has been received.    PERTINENT FAMILY HISTORY: (See attached pedigree)   Mat Grandfather: Prostate cancer  Pat Grandmother: Lung cancer    Records regarding the family history were not available for review.    RISK ASSESSMENT:  Ms. Elizalde’s personal history of ovarian cancer led to concern for a hereditary cancer syndrome. We discussed BRCA1/2 testing that is typically completed through a multigene panel that allows for evaluation of other genes known to impact cancer risk in addition to BRCA1/2.  Ms. Elizalde clearly meets NCCN guidelines criteria for BRCA1/2 testing based on her personal history of ovarian cancer. These risk assessments are based on the family history information provided at the time of the appointment and could change in the future should new information be obtained.    GENETIC COUNSELING: (30 minutes) We reviewed the family history information in detail. Cases of cancer follow three general  patterns: sporadic, familial, and hereditary. While most cancer is sporadic, some cases appear to occur in family clusters. These cases are said to be familial and account for 10-20% of cancer cases. Familial cases may be due to a combination of shared genes and environmental factors among family members. In even fewer families, the cancer is said to be inherited, and the genes responsible for the cancer are known.      Family histories typical of hereditary cancer syndromes usually include multiple first- and second-degree relatives diagnosed with cancer types that define a syndrome. These cases tend to be diagnosed at younger-than-expected ages and can be bilateral or multifocal. The cancer in these families follows an autosomal dominant inheritance pattern, which indicates the likely presence of a mutation in a cancer susceptibility gene. Children and siblings of an individual believed to carry this mutation have a 50% chance of inheriting that mutation, thereby inheriting the increased risk to develop cancer. These mutations can be passed down from the maternal or the paternal lineage.    Hereditary ovarian cancer accounts for approximately 10% of all cases of ovarian cancer. A significant proportion of hereditary breast and ovarian cancer can be attributed to mutations in the BRCA1 and BRCA2 genes. Mutations in these genes confer an increased risk for breast cancer, ovarian cancer, male breast cancer, prostate cancer, and pancreatic cancer. Women with a BRCA1 or BRCA2 mutation have up to an 87% lifetime risk of breast cancer and up to a 58% risk of ovarian cancer. We briefly reviewed Lock syndrome, a hereditary syndrome associated with increased risks of uterine and ovarian cancer, as well as colon cancer.      There are other genes that are known to be associated with an increased risk for cancer. Some of these genes have well defined cancer risks and established management guidelines. Other genes that can be  tested for have been more recently described, and there may be less data regarding the risks and therefore may not have established management guidelines. We reviewed that in some cases, the identification of a genetic mutation may impact treatment options for some types of cancer. We discussed these limitations at length. Based on Ms. Elizalde’s personal history and her desire to get more information regarding her personal risks, testing was pursued through a multigene panel evaluating several other genes known to increase the risk for cancer.    GENETIC TESTING:  The risks, benefits and limitations of genetic testing and implications for clinical management following testing were reviewed. DNA test results can influence decisions regarding screening, prevention and surgical management. Genetic testing can have significant psychological implications for both individuals and families. Also discussed was the possibility of employment and insurance discrimination based on genetic test results and the laws in place to prevent this ([place]).    We discussed panel testing, which would involve testing for BRCA1/2 as well as 34 additional genes that are associated with increased cancer risk. The benefits and limitations of genetic testing were discussed and Ms. Elizalde decided to pursue testing via the panel. The implications of a positive or negative test result were discussed. We discussed the possibility that, in some cases, genetic test results may be informative or may be ambiguous due to the identification of a genetic variant. These variants may or may not be associated with an increased cancer risk. With multigene panel testing, it is not uncommon for a variant of uncertain significance (VUS) to be identified. If a VUS is identified, testing family members is typically not recommended and screening recommendations are made based on the family history. The laboratories that perform genetic testing work to reclassify the VUS  and send out an amended report if and when a VUS is reclassified. The majority of variant findings are ultimately reclassified to a negative result. Given her personal history, a negative test result would not eliminate all cancer risk to her relatives, although the risk would not be as high as it would with positive genetic testing.      PLAN:  Genetic testing was ordered via the CancerNext Panel through [employer]. [facility] will mail a saliva kit directly to her home. Results are expected in 2-3 weeks after the sample has been received and will be called out to Ms. Elizalde. If she has any questions in the meantime, she is welcome to call me at [phone].      Ktaelynn Dominguez MS, [facility], [facility]  Licensed Certified Genetic Counselor

## 2023-06-07 ENCOUNTER — TELEPHONE (OUTPATIENT)
Dept: ONCOLOGY | Facility: CLINIC | Age: 58
End: 2023-06-07
Payer: COMMERCIAL

## 2023-06-07 NOTE — TELEPHONE ENCOUNTER
----- Message from Walker Magana MD sent at 6/7/2023  6:22 AM CDT -----  Order MRI of the abdomen.  Check nodule segment 4 of the liver.    No acute abnormality of the abdomen or pelvis.  Low-density nodule in the liver. The larger nodule appears to be a  cyst. However the smaller nodule in the left lobe as a noncystic CT  density and there are intrinsic septation. This may be further evaluated  with contrast enhanced CT scan or MR imaging of the abdomen with renal  mass protocol.  IVC filter seen. The proximal end of the filter is asymmetrical and  appears to be in the right lateral wall of the IVC. The exact anatomical  location is not certain.

## 2023-06-12 ENCOUNTER — HOSPITAL ENCOUNTER (OUTPATIENT)
Dept: MRI IMAGING | Facility: HOSPITAL | Age: 58
Discharge: HOME | End: 2023-06-12
Admitting: INTERNAL MEDICINE
Payer: COMMERCIAL

## 2023-06-12 DIAGNOSIS — C56.2 MALIGNANT NEOPLASM OF LEFT OVARY: ICD-10-CM

## 2023-06-12 DIAGNOSIS — K76.9 LESION OF LIVER: ICD-10-CM

## 2023-06-12 LAB — CREAT BLDA-MCNC: 1.4 MG/DL (ref 0.6–1.3)

## 2023-06-12 PROCEDURE — A9577 INJ MULTIHANCE: HCPCS | Performed by: INTERNAL MEDICINE

## 2023-06-12 PROCEDURE — 74183 MRI ABD W/O CNTR FLWD CNTR: CPT

## 2023-06-12 PROCEDURE — 0 GADOBENATE DIMEGLUMINE 529 MG/ML SOLUTION: Performed by: INTERNAL MEDICINE

## 2023-06-12 PROCEDURE — 82565 ASSAY OF CREATININE: CPT

## 2023-06-12 RX ADMIN — GADOBENATE DIMEGLUMINE 16 ML: 529 INJECTION, SOLUTION INTRAVENOUS at 14:22

## 2023-06-13 ENCOUNTER — TELEPHONE (OUTPATIENT)
Dept: ONCOLOGY | Facility: CLINIC | Age: 58
End: 2023-06-13
Payer: COMMERCIAL

## 2023-06-13 DIAGNOSIS — R93.89 ABNORMAL MRI: ICD-10-CM

## 2023-06-13 DIAGNOSIS — C56.2 MALIGNANT NEOPLASM OF LEFT OVARY: Primary | ICD-10-CM

## 2023-06-13 NOTE — TELEPHONE ENCOUNTER
----- Message from Walker Magana MD sent at 6/12/2023  4:27 PM CDT -----  Order repeat abdominal ultrasound in 2 months to follow liver cysts.    3.2 cm segment 3 liver cyst without solid components or enhancement. 1  cm segment IVb cystic liver lesion with multiple thin septations but no  mural nodularity or enhancement. Favor both of these lesions to be  benign cysts but recommend a follow-up ultrasound in 2-3 months to  evaluate for stability of the smaller septated cystic lesion.

## 2023-06-13 NOTE — TELEPHONE ENCOUNTER
Contacted patient Tiff Elizalde, reviewed results of her recent MRI per Dr Magana request:  Order repeat abdominal ultrasound in 2 months to follow liver cysts.     3.2 cm segment 3 liver cyst without solid components or enhancement. 1   cm segment IV cystic liver lesion with multiple thin septations but no   mural nodularity or enhancement. Favor both of these lesions to be   benign cysts but recommend a follow-up ultrasound in 2-3 months to   evaluate for stability of the smaller septated cystic lesion.     Per his recommendations will order a Liver US x 2 months to follow the liver lesions noted on MRI  Patient v/u and will be contacted once the Liver US is marcos with time and date.   Patient encouraged to call if she has any questions or concerns. She v/u

## 2023-06-23 NOTE — DISCHARGE INSTRUCTIONS
Before you come to the hospital        Arrival time: AS DIRECTED BY OFFICE     YOU MAY TAKE THE FOLLOWING MEDICATION(S) THE MORNING OF SURGERY WITH A SIP OF WATER: NONE    ***HOLD LISINOPRIL FOR 24 HOURS PRIOR TO SURGERY***            ALL OTHER HOME MEDICATION CHECK WITH YOUR PHYSICIAN (especially if   you are taking diabetes medicines or blood thinners)    Do not take any Erectile Dysfunction medications (EX: CIALIS, VIAGRA) 24 hours prior to surgery.      If you were given and instructed to use a germ- killing soap, use as directed the night before surgery and again the morning of surgery or as directed by your surgeon. (Use one-half of the bottle with each shower.)   See attached information for How to Use Chlorhexidine for Bathing if applicable.            Eating and drinking restrictions prior to scheduled arrival time    2 Hours before arrival time STOP   Drinking Clear liquids (water, apple juice-no pulp)     6 Hours before arrival time STOP   Milk or drinks that contain milk, full liquids    6 Hours before arrival time STOP   Light meals or foods, such as toast or cereal    8 Hours before arrival time STOP   Heavy foods, such as meat, fried foods, or fatty foods    (It is extremely important that you follow these guidelines to prevent delay or cancelation of your procedure)     Clear Liquids  Water and flavored water                                                                      Clear Fruit juices, such as cranberry juice and apple juice.  Black coffee (NO cream of any kind, including powdered).  Plain tea  Clear bouillon or broth.  Flavored gelatin.  Soda.  Gatorade or Powerade.  Full liquid examples  Juices that have pulp.  Frozen ice pops that contain fruit pieces.  Coffee with creamer  Milk.  Yogurt.                MANAGING PAIN AFTER SURGERY    We know you are probably wondering what your pain will be like after surgery.  Following surgery it is unrealistic to expect you will not have pain.   Pain  is how our bodies let us know that something is wrong or cautions us to be careful.  That said, our goal is to make your pain tolerable.    Methods we may use to treat your pain include (oral or IV medications, PCAs, epidurals, nerve blocks, etc.)   While some procedures require IV pain medications for a short time after surgery, transitioning to pain medications by mouth allows for better management of pain.   Your nurse will encourage you to take oral pain medications whenever possible.  IV medications work almost immediately, but only last a short while.  Taking medications by mouth allows for a more constant level of medication in your blood stream for a longer period of time.      Once your pain is out of control it is harder to get back under control.  It is important you are aware when your next dose of pain medication is due.  If you are admitted, your nurse may write the time of your next dose on the white board in your room to help you remember.      We are interested in your pain and encourage you to inform us about aggravating factors during your visit.   Many times a simple repositioning every few hours can make a big difference.    If your physician says it is okay, do not let your pain prevent you from getting out of bed. Be sure to call your nurse for assistance prior to getting up so you do not fall.      Before surgery, please decide your tolerable pain goal.  These faces help describe the pain ratings we use on a 0-10 scale.   Be prepared to tell us your goal and whether or not you take pain or anxiety medications at home.          Preparing for Surgery  Preparing for surgery is an important part of your care. It can make things go more smoothly and help you avoid complications. The steps leading up to surgery may vary among hospitals. Follow all instructions given to you by your health care providers. Ask questions if you do not understand something. Talk about any concerns that you have.  Here  are some questions to consider asking before your surgery:  If my surgery is not an emergency (is elective), when would be the best time to have the surgery?  What arrangements do I need to make for work, home, or school?  What will my recovery be like? How long will it be before I can return to normal activities?  Will I need to prepare my home? Will I need to arrange care for me or my children?  Should I expect to have pain after surgery? What are my pain management options? Are there nonmedical options that I can try for pain?  Tell a health care provider about:  Any allergies you have.  All medicines you are taking, including vitamins, herbs, eye drops, creams, and over-the-counter medicines.  Any problems you or family members have had with anesthetic medicines.  Any blood disorders you have.  Any surgeries you have had.  Any medical conditions you have.  Whether you are pregnant or may be pregnant.  What are the risks?  The risks and complications of surgery depend on the specific procedure that you have. Discuss all the risks with your health care providers before your surgery. Ask about common surgical complications, which may include:  Infection.  Bleeding or a need for blood replacement (transfusion).  Allergic reactions to medicines.  Damage to surrounding nerves, tissues, or structures.  A blood clot.  Scarring.  Failure of the surgery to correct the problem.  Follow these instructions before the procedure:  Several days or weeks before your procedure  You may have a physical exam by your primary health care provider to make sure it is safe for you to have surgery.  You may have testing. This may include a chest X-ray, blood and urine tests, electrocardiogram (ECG), or other testing.  Ask your health care provider about:  Changing or stopping your regular medicines. This is especially important if you are taking diabetes medicines or blood thinners.  Taking medicines such as aspirin and ibuprofen. These  medicines can thin your blood. Do not take these medicines unless your health care provider tells you to take them.  Taking over-the-counter medicines, vitamins, herbs, and supplements.  Do not use any products that contain nicotine or tobacco, such as cigarettes and e-cigarettes. If you need help quitting, ask your health care provider.  Avoid alcohol.  Ask your health care provider if there are exercises you can do to prepare for surgery.  Eat a healthy diet.   Plan to have someone take you home from the hospital or clinic.  Plan to have a responsible adult care for you for at least 24 hours after you leave the hospital or clinic. This is important.  The day before your procedure  You may be given antibiotic medicine to take by mouth to help prevent infection. Take it as told by your health care provider.  You may be asked to shower with a germ-killing soap.  Follow instructions from your health care provider about eating and drinking restrictions. This includes gum, mints and hard candy.  Pack comfortable clothes according to your procedure.   The day of your procedure  You may need to take another shower with a germ-killing soap before you leave home in the morning.  With a small sip of water, take only the medicines that you are told to take.  Remove all jewelry including rings.   Leave anything you consider valuable at home except hearing aids if needed.  You do not need to bring your home medications into the hospital.   Do not wear any makeup, nail polish, powder, deodorant, lotion, hair accessories, or anything on your skin or body except your clothes.  If you will be staying in the hospital, bring a case to hold your glasses, contacts, or dentures. You may also want to bring your robe and non-skid footwear.       (Do not use denture adhesives since you will be asked to remove them during  surgery).   If you wear oxygen at home, bring it with you the day of surgery.  If instructed by your health care  provider, bring your sleep apnea device with you on the day of your surgery (if this applies to you).  You may want to leave your suitcase and sleep apnea device in the car until after surgery.   Arrive at the hospital as scheduled.  Bring a friend or family member with you who can help to answer questions and be present while you meet with your health care provider.  At the hospital  When you arrive at the hospital:  Go to registration located at the main entrance of the hospital. You will be registered and given a beeper and a sticker sheet. Take the stickers to the Outpatient nurses desk and place in the black tray. This is to notify staff that you have arrived. Then return to the lobby to wait.   When your beeper lights up and vibrates proceed through the double doors, under the stairs, and a member of the Outpatient Surgery staff will escort you to your preoperative room.  You may have to wear compression sleeves. These help to prevent blood clots and reduce swelling in your legs.  An IV may be inserted into one of your veins.              In the operating room, you may be given one or more of the following:        A medicine to help you relax (sedative).        A medicine to numb the area (local anesthetic).        A medicine to make you fall asleep (general anesthetic).        A medicine that is injected into an area of your body to numb everything below the                      injection site (regional anesthetic).  You may be given an antibiotic through your IV to help prevent infection.  Your surgical site will be marked or identified.    Contact a health care provider if you:  Develop a fever of more than 100.4°F (38°C) or other feelings of illness during the 48 hours before your surgery.  Have symptoms that get worse.  Have questions or concerns about your surgery.  Summary  Preparing for surgery can make the procedure go more smoothly and lower your risk of complications.  Before surgery, make a list of  questions and concerns to discuss with your surgeon. Ask about the risks and possible complications.  In the days or weeks before your surgery, follow all instructions from your health care provider. You may need to stop smoking, avoid alcohol, follow eating restrictions, and change or stop your regular medicines.  Contact your surgeon if you develop a fever or other signs of illness during the few days before your surgery.  This information is not intended to replace advice given to you by your health care provider. Make sure you discuss any questions you have with your health care provider.  Document Revised: 12/21/2018 Document Reviewed: 10/23/2018  [employer] Patient Education © 2021 [employer]           Clindamycin Counseling: I counseled the patient regarding use of clindamycin as an antibiotic for prophylactic and/or therapeutic purposes. Clindamycin is active against numerous classes of bacteria, including skin bacteria. Side effects may include nausea, diarrhea, gastrointestinal upset, rash, hives, yeast infections, and in rare cases, colitis.

## 2023-07-27 ENCOUNTER — TELEPHONE (OUTPATIENT)
Dept: ONCOLOGY | Facility: CLINIC | Age: 58
End: 2023-07-27
Payer: COMMERCIAL

## 2023-07-31 DIAGNOSIS — M25.559 HIP PAIN, UNSPECIFIED LATERALITY: Primary | ICD-10-CM

## 2023-08-08 NOTE — PROGRESS NOTES
[facility] ONC [facility] GROUP HEMATOLOGY & ONCOLOGY  [address]  Formerly [facility] [address] [phone]    Patient Name: Tiff Elizalde  Encounter Date: 08/23/2023  YOB: 1965  Patient Number: [id number]      REASON FOR FOLLOW-UP: Tiff Elizalde is a pleasant 57 y.o.  female who is seen on follow-up for stage III A1 ovarian cancer, left. She is seen 4 months post 6 cycles of adjuvant paclitaxel and carboplatin.  She had hypersensitivity reaction to paclitaxel on C1D1.  She is on adjuvant letrozole since 6/2023 through present. She is seen with Dennis, her spouse. History is obtained from patient.  She is a reliable historian.         Oncology/Hematology History Overview Note   DIAGNOSTIC ABNORMALITIES:   Pain lower back and left leg swelling.  She presented to her PCP.   CT scan done at [facility]. Details not available.  Patient seen by Dr. Virgilio Hayward 11/23/2022. Patient found to have a large pelvic mass causing bilateral hydronephrosis with resultant nonfunctioning left kidney, elevated creatinine, left deep venous thrombosis and pulmonary embolus.  Pathology report 11/28/2022.  Fallopian tube and ovary, left salpingo-oophorectomy: Fallopian tube with no evidence of involvement by malignancy.  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  No surface involvement demonstrated.  Uterus, fallopian tube and ovary, hysterectomy with right salpingo-oophorectomy: Cervix and endocervix with no evidence of squamous or glandular dysplasia.  Chronic cervicitis present.  Weakly proliferative endometrium, negative for hyperplasia, carcinoma, and endometritis.  Benign endometrial polyp present.  Adenomyosis.  No leiomyomata.  Right fallopian tube with focal involvement by serous neoplasm with Samona by this most consistent with low-grade serous carcinoma.  Remnant left ovary with adhesions to uterine serosa and  focal involvement by low-grade serous carcinoma.  Right ovary with surface involvement by low-grade serous carcinoma.  Omentum biopsy: Involved by low-grade serous carcinoma, invasive).  Sidewall, left pelvic biopsy: Edema with hemorrhage and chronic inflammation, negative for endometriosis and malignancy.  Lymph node left pelvic biopsy: 1 lymph node with a 0.9 mm focus of metastatic serous carcinoma surrounding adipose tissue with hemorrhage and edema.           PREVIOUS INTERVENTIONS:  IVC filter was placed 11/22/2022 at [place].  She had undergone exploratory laparotomy, total abdominal hysterectomy, bilateral salpingo-oophorectomy, debulking, left pelvic lymph node dissection and omentectomy by Dr. Hayward on 11/23/2022.  Estimated blood loss 400 ml.  Laparotomy showed a massive mass arising from the left ovary, retroperitoneal, infiltrating the retroperitoneal space and adherent to the sigmoid mesentery.  Within the retroperitoneum it was densely adherent to the iliac vessel on the left side and ureter.  Deliberate rupture of the mass was required in order to access the retroperitoneal attachment.  The mass was sent for frozen section and determined to be at least borderline tumor possibly low-grade serous tumor.  There was no other evidence of disease in the pelvis or upper abdomen.  Due to being densely adherent to the retroperitoneum and iliac vessels, it is unclear whether the entire tumor was removed or not and the pelvic sidewall biopsy was taken to determine whether the residual ovary was present and not a fibrotic reaction.  Bilateral ureterolysis was required.   Hypersensitivity reaction to Taxol 01/09/2023.   Adjuvant paclitaxel and carboplatin 01/10/2023 through 4/28/2023, 6 cycles.      Ovarian cancer   12/15/2022 Initial Diagnosis    Ovarian cancer (HCC)     1/9/2023 -  Chemotherapy    OP OVARIAN PACLitaxel / CARBOplatin (Q21D)       1/9/2023 -  Chemotherapy    OP CENTRAL VENOUS ACCESS DEVICE  ACCESS, CARE, AND MAINTENANCE (CVAD)       1/20/2023 Cancer Staged    Staging form: Ovary, Fallopian Tube, And Primary Peritoneal Carcinoma, AJCC 8th Edition  - Pathologic stage from 1/20/2023: FIGO Stage III (pT3, pN1, cM0) - Signed by Walker Magana MD on 1/20/2023     2/15/2023 - 2/15/2023 Chemotherapy    OP MAGNESIUM            PAST MEDICAL HISTORY:  ALLERGIES:  No Known Allergies  CURRENT MEDICATIONS:  Outpatient Encounter Medications as of 8/23/2023   Medication Sig Dispense Refill    Calcium Carbonate-Vit D-Min (CALCIUM 1200 PO) Take  by mouth.      Eliquis 5 MG tablet tablet 1 tablet Every 12 (Twelve) Hours.      lidocaine-prilocaine (EMLA) 2.5-2.5 % cream Apply to port site 30 minutes prior to being accessed. 30 g 0    lisinopril-hydrochlorothiazide (PRINZIDE,ZESTORETIC) 20-12.5 MG per tablet Take 1 tablet by mouth Daily.      magnesium oxide (MAG-OX) 400 MG tablet Take 1 tablet by mouth 2 (Two) Times a Day. 6 tablet 0    vitamin D3 125 MCG (5000 UT) capsule capsule Take  by mouth Daily.      [DISCONTINUED] dexamethasone (DECADRON) 4 MG tablet Take 1 pill night before chemo and morning of chemo. 10 tablet 2    [DISCONTINUED] ferrous sulfate 325 (65 FE) MG tablet Take 1 tablet by mouth Daily With Breakfast. 60 tablet 2    [DISCONTINUED] HYDROcodone-acetaminophen (NORCO) 7.5-325 MG per tablet Take 1 tablet by mouth Every 4 (Four) Hours As Needed for Moderate Pain (Pain). 15 tablet 0    [DISCONTINUED] ondansetron (Zofran) 8 MG tablet Take 1 tablet by mouth Every 8 (Eight) Hours As Needed for Nausea or Vomiting. 60 tablet 2    [DISCONTINUED] prochlorperazine (COMPAZINE) 10 MG tablet Take 1 tablet by mouth Every 6 (Six) Hours As Needed for Nausea. 60 tablet 2     No facility-administered encounter medications on file as of 8/23/2023.     ADULT ILLNESSES:  Patient Active Problem List   Diagnosis Code    Ovarian cancer C56.9     SURGERIES:  Past Surgical History:   Procedure Laterality Date    VENA CAVA FILTER  INSERTION      VENOUS ACCESS DEVICE (PORT) INSERTION N/A 12/30/2022    Procedure: INSERTION VENOUS ACCESS DEVICE;  Surgeon: Holley Mcgill MD;  Location: [facility] OR;  Service: General;  Laterality: N/A;     HEALTH MAINTENANCE ITEMS:  Health Maintenance Due   Topic Date Due    MAMMOGRAM  Never done    COLORECTAL CANCER SCREENING  Never done    TDAP/TD VACCINES (2 - Tdap) 02/24/2008    ZOSTER VACCINE (1 of 2) Never done    COVID-19 Vaccine (4 - Moderna series) 03/24/2022    HEPATITIS C SCREENING  Never done    ANNUAL PHYSICAL  Never done    PAP SMEAR  Never done       <no information>  Last Completed Colonoscopy       This patient has no relevant Health Maintenance data.          Immunization History   Administered Date(s) Administered    COVID-19 (MODERNA) 1st,2nd,3rd Dose Monovalent 07/29/2021, 08/26/2021    COVID-19 (MODERNA) Monovalent Original Booster 01/27/2022     Last Completed Mammogram       This patient has no relevant Health Maintenance data.              FAMILY HISTORY:  No family history on file.  SOCIAL HISTORY:  Social History     Socioeconomic History    Marital status:    Tobacco Use    Smoking status: Never   Vaping Use    Vaping Use: Never used   Substance and Sexual Activity    Alcohol use: Never    Drug use: Never    Sexual activity: Defer       REVIEW OF SYSTEMS:    Review of Systems   Constitutional:  Negative for fatigue, fever and unexpected weight change.        Left hip pain.    HENT:  Negative for congestion, mouth sores and trouble swallowing.    Eyes:  Negative for redness and visual disturbance.   Respiratory:  Negative for cough, shortness of breath and wheezing.    Cardiovascular:  Negative for chest pain and leg swelling.   Gastrointestinal:  Negative for abdominal pain, nausea and vomiting.   Endocrine: Negative for polydipsia and polyphagia.   Genitourinary:  Negative for difficulty urinating, dysuria and flank pain.   Musculoskeletal:  Negative for gait problem and  "myalgias.   Skin:  Negative for pallor.   Allergic/Immunologic: Negative for food allergies.   Neurological:  Negative for dizziness and weakness.        \"Hands are fine but my toes are tingly and not as sensitive.\"   Hematological:  Negative for adenopathy. Does not bruise/bleed easily.   Psychiatric/Behavioral:  Negative for agitation, confusion and hallucinations.          VITAL SIGNS: /68   Pulse 70   Temp 97.2 øF (36.2 øC)   Resp 18   Ht 170.2 cm (67\")   Wt 84.6 kg (186 lb 8 oz)   SpO2 96%   Breastfeeding No   BMI 29.21 kg/mý  Gained 11 pounds.   Pain Score    08/23/23 0810   PainSc: 0-No pain       PHYSICAL EXAMINATION:     Physical Exam  Vitals reviewed.   Constitutional:       General: She is not in acute distress.  HENT:      Head: Normocephalic and atraumatic.   Eyes:      General: No scleral icterus.  Cardiovascular:      Rate and Rhythm: Normal rate.   Pulmonary:      Effort: No respiratory distress.      Breath sounds: No wheezing or rales.      Comments: Port, left. No erythema.   Abdominal:      General: Bowel sounds are normal.      Palpations: Abdomen is soft.      Tenderness: There is no abdominal tenderness.   Musculoskeletal:         General: No swelling.      Cervical back: Neck supple.   Skin:     General: Skin is warm.      Coloration: Skin is not pale.   Neurological:      Mental Status: She is alert and oriented to person, place, and time.   Psychiatric:         Mood and Affect: Mood normal.         Behavior: Behavior normal.         Thought Content: Thought content normal.         Judgment: Judgment normal.       LABS    Lab Results - Last 18 Months   Lab Units 08/16/23  0802 06/21/23  0750 04/28/23  0754 04/07/23  0831 03/15/23  1258 02/24/23  0757   HEMOGLOBIN g/dL 12.0 11.4* 8.9* 9.1* 9.4* 10.1*   HEMATOCRIT % 37.0 34.9 26.8* 27.4* 28.6* 30.9*   MCV fL 91.1 101.2* 103.1* 96.8 92.6 89.3   WBC 10*3/mm3 7.00 5.88 6.48 6.85 7.13 9.62   RDW % 12.5 12.7 17.2* 19.3* 17.8* 16.0* "   MPV fL 9.4 9.5 9.6 9.4 9.4 10.1   PLATELETS 10*3/mm3 207 154 100* 104* 110* 140   IMM GRAN % % 0.1 0.2 0.5 0.4 0.3 0.3   NEUTROS ABS 10*3/mm3 4.14 3.42 5.85 5.79 4.48 8.21*   LYMPHS ABS 10*3/mm3 2.02 1.54 0.49* 0.70 1.97 0.88   MONOS ABS 10*3/mm3 0.73 0.67 0.11 0.32 0.63 0.49   EOS ABS 10*3/mm3 0.07 0.20 0.00 0.00 0.01 0.00   BASOS ABS 10*3/mm3 0.03 0.04 0.00 0.01 0.02 0.01   IMMATURE GRANS (ABS) 10*3/mm3 0.01 0.01 0.03 0.03 0.02 0.03   NRBC /100 WBC 0.0 0.0 0.0 0.0 0.0 0.0       Lab Results - Last 18 Months   Lab Units 08/16/23  0802 06/21/23  0750 06/12/23  1309 05/12/23  1444 04/28/23  0754 04/07/23  0831 03/15/23  1257 02/24/23  0757   GLUCOSE mg/dL 101* 78  --   --  125* 112* 83 140*   SODIUM mmol/L 140 143  --   --  137 137 140 138   POTASSIUM mmol/L 4.1 4.2  --   --  4.6 4.2 4.2 4.3   CO2 mmol/L 28.0 28.0  --   --  22.0 24.0 25.0 23.0   CHLORIDE mmol/L 104 105  --   --  102 101 103 103   ANION GAP mmol/L 8.0 10.0  --   --  13.0 12.0 12.0 12.0   CREATININE mg/dL 1.50* 1.29* 1.40* 1.40* 1.26* 1.15* 1.13* 1.06*   BUN mg/dL 24* 27*  --   --  27* 25* 19 24*   BUN / CREAT RATIO  16.0 20.9  --   --  21.4 21.7 16.8 22.6   CALCIUM mg/dL 10.4 10.0  --   --  10.1 10.0 9.8 10.1   ALK PHOS U/L 71 66  --   --  84 88 82 86   TOTAL PROTEIN g/dL 7.2 7.1  --   --  7.2 7.2 6.9 7.1   ALT (SGPT) U/L 17 14  --   --  15 21 21 22   AST (SGOT) U/L 16 16  --   --  15 18 18 18   BILIRUBIN mg/dL 0.2 0.2  --   --  0.3 0.2 0.2 0.3   ALBUMIN g/dL 4.4 4.2  --   --  4.6 4.3 4.4 4.4   GLOBULIN gm/dL 2.8 2.9  --   --  2.6 2.9 2.5 2.7       No results for input(s): MSPIKE, KAPPALAMB, IGLFLC, URICACID, FREEKAPPAL, CEA, LDH, REFLABREPO in the last [id number] hours.    Lab Results - Last 18 Months   Lab Units 06/21/23  0750 01/09/23  0845   IRON mcg/dL 76 35*   TIBC mcg/dL 325 308   IRON SATURATION (TSAT) % 23 11*   FERRITIN ng/mL 262.30* 379.80*       Tiff Elizalde reports a pain score of 0.        ASSESSMENT:  1.  Ovarian cancer, low-grade serous  "carcinoma, left.  Tumor size 18 cm. Negative genetic abnormality.   AJCC stage:IIIA1 (pT3, pN1, cM0, G1)  Treatment status: Postadjuvant paclitaxel and carboplatin 01/10/2023 through 4/28/2023, 6 cycles. Adjuvant letrozole 2.5 mg 6/2023 through present by Dr. Hayward.  2.  Performance status of 0.  3.  Left deep venous thrombosis from malignancy.  On therapy with apixaban.  4.  Pulmonary embolism from malignancy.  Undergoing treatment with apixaban.  Post IVC filter 11/22/2022.  5.  Anemia from iron deficiency and chronic kidney disease stage IIIa, GFR 59.3 ml/min on 2/3/2023. Oral iron 01/10/2023 through 6/21/2023.    6.  Hypersensitivity reaction to paclitaxel on 01/09/2023. Responded to dexamethasone as premed.   7.  Grade 1 neuropathy. Under observation. Gabapentin on hold.    8.  RIGHT lower lobe 3 mm pulmonary nodule. Observation.  9.  RIGHT thyroid with a 4 cm heterogeneous nodule. Per PCP.  10. Nodule located in the segment 4 a of the liver measures 7 mm on 5/12/2023, MRI abdomen on 6/12/2023 showed benign cyst.        PLAN:  1.   Re: Venous Doppler report 8/16/2023.  There is evidence of chronic partial deep venous thrombosis of the left lower extremity.  2.   Re: Tolerance to oral iron.  \"Okay\"  3.   Re: Heme status.  WBC 7, hemoglobin 12, hematocrit 37, MCV 91.1 and platelet 207.   4.   Re: CMP.  GFR 40 from 47.9 mL per minute   5.   Re: Magnesium 1.9.  at 38.9 from 43 from 33. Negative CT scan 5/12/2023.   6.   Re: MRI abdomen on 6/12/2023.3.2 cm segment 3 liver cyst without solid components or enhancement. 1 cm segment IVb cystic liver lesion with multiple thin septations but no  mural nodularity or enhancement. Favor both of these lesions to be benign cysts but recommend a follow-up ultrasound in 2-3 months to evaluate for stability of the smaller septated cystic lesion  7.   Re: Abdominal sonogram report 8/16/2023. Cystic liver lesions, as " "discussed above. No suspicious appearing solid lesion in the liver is identified.  No gallstones or ductal dilatation.  Echogenic pancreas can be a normal variant. It can also be seen in patients with diabetes and prediabetes.  8.   Re: Restaging scans for elevated .  9.   Re: Stable for observation, ovarian cancer.   10.  Plan to re-evaluate IVC at [facility].\"They will call me.\"   11.  Flush port every 6 weeks.  12.  eRx for Zofran 8 mg p.o. every 8 hours as needed for nausea and vomiting #60, 2 refills if needed.  13.  eRx gabapentin 300 mg po three times daily, 90, 2 refills if needed.  Observe for lightheadedness, dizziness or hypotension.  14.  eRx letrozole 2.5 mg po daily, 90, 3 refills if needed. Observe for hot flashes and bone loss.   15.  Continue ongoing management per primary care physician and other specialists.  16.  Plan of care discussed with patient and Dennis.  Understanding expressed.  Patient agreeable to proceed.  17. Questions were answered to her satisfaction. \"Right.\"  18.  She will be seen every 2 to 4 months for the first 2 years then every 3 to 6 months for the next 3 years.  Annually after 5 years.  Imaging as clinically indicated.  19.  Order CT chest, abdomen and pelvis for elevated .  20. Order bone density. She is on letrozole.    21.  Return to office in 12 weeks with CBC with differential, CMP, , and pre office left leg venous doppler.          I have reviewed the assessment and plan and verified the accuracy of it. No changes to assessment and plan since the information was documented. Walker Magana MD 08/23/23         I spent 40 total minutes, face-to-face, caring for Tiff today. Greater than 50% of this time involved counseling and/or coordination of care as documented within this note.              (Holley Mcgill MD)  MD Tyrone Galindo MD    "

## 2023-08-16 ENCOUNTER — HOSPITAL ENCOUNTER (OUTPATIENT)
Dept: ULTRASOUND IMAGING | Facility: HOSPITAL | Age: 58
Discharge: HOME | End: 2023-08-16
Payer: COMMERCIAL

## 2023-08-16 ENCOUNTER — INFUSION (OUTPATIENT)
Dept: ONCOLOGY | Facility: CLINIC | Age: 58
End: 2023-08-16
Payer: COMMERCIAL

## 2023-08-16 ENCOUNTER — LAB (OUTPATIENT)
Dept: LAB | Facility: HOSPITAL | Age: 58
End: 2023-08-16
Payer: COMMERCIAL

## 2023-08-16 DIAGNOSIS — C56.2 MALIGNANT NEOPLASM OF LEFT OVARY: Primary | ICD-10-CM

## 2023-08-16 DIAGNOSIS — R93.89 ABNORMAL MRI: ICD-10-CM

## 2023-08-16 DIAGNOSIS — D50.8 IRON DEFICIENCY ANEMIA SECONDARY TO INADEQUATE DIETARY IRON INTAKE: ICD-10-CM

## 2023-08-16 DIAGNOSIS — C56.2 MALIGNANT NEOPLASM OF LEFT OVARY: ICD-10-CM

## 2023-08-16 LAB
ALBUMIN SERPL-MCNC: 4.4 G/DL (ref 3.5–5.2)
ALBUMIN/GLOB SERPL: 1.6 G/DL
ALP SERPL-CCNC: 71 U/L (ref 39–117)
ALT SERPL W P-5'-P-CCNC: 17 U/L (ref 1–33)
ANION GAP SERPL CALCULATED.3IONS-SCNC: 8 MMOL/L (ref 5–15)
AST SERPL-CCNC: 16 U/L (ref 1–32)
BASOPHILS # BLD AUTO: 0.03 10*3/MM3 (ref 0–0.2)
BASOPHILS NFR BLD AUTO: 0.4 % (ref 0–1.5)
BILIRUB SERPL-MCNC: 0.2 MG/DL (ref 0–1.2)
BUN SERPL-MCNC: 24 MG/DL (ref 6–20)
BUN/CREAT SERPL: 16 (ref 7–25)
CALCIUM SPEC-SCNC: 10.4 MG/DL (ref 8.6–10.5)
CANCER AG125 SERPL QL: 38.9 U/ML (ref 0–38.1)
CHLORIDE SERPL-SCNC: 104 MMOL/L (ref 98–107)
CO2 SERPL-SCNC: 28 MMOL/L (ref 22–29)
CREAT SERPL-MCNC: 1.5 MG/DL (ref 0.57–1)
DEPRECATED RDW RBC AUTO: 41.4 FL (ref 37–54)
EGFRCR SERPLBLD CKD-EPI 2021: 40 ML/MIN/1.73
EOSINOPHIL # BLD AUTO: 0.07 10*3/MM3 (ref 0–0.4)
EOSINOPHIL NFR BLD AUTO: 1 % (ref 0.3–6.2)
ERYTHROCYTE [DISTWIDTH] IN BLOOD BY AUTOMATED COUNT: 12.5 % (ref 12.3–15.4)
GLOBULIN UR ELPH-MCNC: 2.8 GM/DL
GLUCOSE SERPL-MCNC: 101 MG/DL (ref 65–99)
HCT VFR BLD AUTO: 37 % (ref 34–46.6)
HGB BLD-MCNC: 12 G/DL (ref 12–15.9)
IMM GRANULOCYTES # BLD AUTO: 0.01 10*3/MM3 (ref 0–0.05)
IMM GRANULOCYTES NFR BLD AUTO: 0.1 % (ref 0–0.5)
LYMPHOCYTES # BLD AUTO: 2.02 10*3/MM3 (ref 0.7–3.1)
LYMPHOCYTES NFR BLD AUTO: 28.9 % (ref 19.6–45.3)
MAGNESIUM SERPL-MCNC: 1.9 MG/DL (ref 1.6–2.6)
MCH RBC QN AUTO: 29.6 PG (ref 26.6–33)
MCHC RBC AUTO-ENTMCNC: 32.4 G/DL (ref 31.5–35.7)
MCV RBC AUTO: 91.1 FL (ref 79–97)
MONOCYTES # BLD AUTO: 0.73 10*3/MM3 (ref 0.1–0.9)
MONOCYTES NFR BLD AUTO: 10.4 % (ref 5–12)
NEUTROPHILS NFR BLD AUTO: 4.14 10*3/MM3 (ref 1.7–7)
NEUTROPHILS NFR BLD AUTO: 59.2 % (ref 42.7–76)
NRBC BLD AUTO-RTO: 0 /100 WBC (ref 0–0.2)
PLATELET # BLD AUTO: 207 10*3/MM3 (ref 140–450)
PMV BLD AUTO: 9.4 FL (ref 6–12)
POTASSIUM SERPL-SCNC: 4.1 MMOL/L (ref 3.5–5.2)
PROT SERPL-MCNC: 7.2 G/DL (ref 6–8.5)
RBC # BLD AUTO: 4.06 10*6/MM3 (ref 3.77–5.28)
SODIUM SERPL-SCNC: 140 MMOL/L (ref 136–145)
WBC NRBC COR # BLD: 7 10*3/MM3 (ref 3.4–10.8)

## 2023-08-16 PROCEDURE — 93971 EXTREMITY STUDY: CPT

## 2023-08-16 PROCEDURE — 85025 COMPLETE CBC W/AUTO DIFF WBC: CPT

## 2023-08-16 PROCEDURE — 86304 IMMUNOASSAY TUMOR CA 125: CPT

## 2023-08-16 PROCEDURE — 83735 ASSAY OF MAGNESIUM: CPT

## 2023-08-16 PROCEDURE — 80053 COMPREHEN METABOLIC PANEL: CPT

## 2023-08-16 PROCEDURE — 36415 COLL VENOUS BLD VENIPUNCTURE: CPT

## 2023-08-16 PROCEDURE — 76705 ECHO EXAM OF ABDOMEN: CPT

## 2023-08-16 RX ORDER — SODIUM CHLORIDE 0.9 % (FLUSH) 0.9 %
10 SYRINGE (ML) INJECTION AS NEEDED
Status: DISCONTINUED | OUTPATIENT
Start: 2023-08-16 | End: 2023-08-16 | Stop reason: HOSPADM

## 2023-08-16 RX ORDER — SODIUM CHLORIDE 0.9 % (FLUSH) 0.9 %
10 SYRINGE (ML) INJECTION AS NEEDED
OUTPATIENT
Start: 2023-08-16

## 2023-08-16 RX ORDER — HEPARIN SODIUM (PORCINE) LOCK FLUSH IV SOLN 100 UNIT/ML 100 UNIT/ML
500 SOLUTION INTRAVENOUS AS NEEDED
Status: DISCONTINUED | OUTPATIENT
Start: 2023-08-16 | End: 2023-08-16 | Stop reason: HOSPADM

## 2023-08-16 RX ORDER — HEPARIN SODIUM (PORCINE) LOCK FLUSH IV SOLN 100 UNIT/ML 100 UNIT/ML
500 SOLUTION INTRAVENOUS AS NEEDED
OUTPATIENT
Start: 2023-08-16

## 2023-08-16 RX ADMIN — HEPARIN SODIUM (PORCINE) LOCK FLUSH IV SOLN 100 UNIT/ML 500 UNITS: 100 SOLUTION at 08:20

## 2023-08-16 RX ADMIN — Medication 10 ML: at 08:20

## 2023-08-21 ENCOUNTER — TELEPHONE (OUTPATIENT)
Dept: ONCOLOGY | Facility: CLINIC | Age: 58
End: 2023-08-21
Payer: COMMERCIAL

## 2023-08-21 NOTE — TELEPHONE ENCOUNTER
Patient notified of test results. Stable scans and venous doppler.  She is to keep her follow up appt this week.

## 2023-08-21 NOTE — TELEPHONE ENCOUNTER
----- Message from Walker Magana MD sent at 8/16/2023  1:12 PM CDT -----  Notify patient.        Cystic liver lesions. No suspicious appearing  solid lesion in the liver is identified.  No gallstones or ductal dilatation.   Echogenic pancreas can be a normal variant. It can also be seen in  patients with diabetes and prediabetes.

## 2023-08-23 ENCOUNTER — OFFICE VISIT (OUTPATIENT)
Dept: ONCOLOGY | Facility: CLINIC | Age: 58
End: 2023-08-23
Payer: COMMERCIAL

## 2023-08-23 VITALS
SYSTOLIC BLOOD PRESSURE: 136 MMHG | HEART RATE: 70 BPM | WEIGHT: 186.5 LBS | BODY MASS INDEX: 29.27 KG/M2 | OXYGEN SATURATION: 96 % | RESPIRATION RATE: 18 BRPM | HEIGHT: 67 IN | DIASTOLIC BLOOD PRESSURE: 68 MMHG | TEMPERATURE: 97.2 F

## 2023-08-23 DIAGNOSIS — C56.2 MALIGNANT NEOPLASM OF LEFT OVARY: Primary | ICD-10-CM

## 2023-08-23 DIAGNOSIS — Z78.0 MENOPAUSE: ICD-10-CM

## 2023-08-23 NOTE — LETTER
August 23, 2023       No Recipients    Patient: Tiff Elizalde   YOB: 1965   Date of Visit: 8/23/2023     Dear Tyrone Garza MD:       Thank you for referring Tiff Elizadle to me for evaluation. Below are the relevant portions of my assessment and plan of care.    If you have questions, please do not hesitate to call me. I look forward to following Tiff along with you.         Sincerely,        Walker Magana MD        CC:   No Recipients    Walker Magana MD  08/23/23 0838  Sign when Signing Visit  [facility] HEMATOLOGY & ONCOLOGY  [address]  [facility] [id number] [phone]    Patient Name: Tiff Elizalde  Encounter Date: 08/23/2023  YOB: 1965  Patient Number: [id number]      REASON FOR FOLLOW-UP: Tiff Elizalde is a pleasant 57 y.o.  female who is seen on follow-up for stage III A1 ovarian cancer, left. She is seen 4 months post 6 cycles of adjuvant paclitaxel and carboplatin.  She had hypersensitivity reaction to paclitaxel on C1D1.  She is on adjuvant letrozole since 6/2023 through present. She is seen with Dennis, her spouse. History is obtained from patient.  She is a reliable historian.         Oncology/Hematology History Overview Note   DIAGNOSTIC ABNORMALITIES:   Pain lower back and left leg swelling.  She presented to her PCP.   CT scan done at [facility]. Details not available.  Patient seen by Dr. Virgilio Hayward 11/23/2022. Patient found to have a large pelvic mass causing bilateral hydronephrosis with resultant nonfunctioning left kidney, elevated creatinine, left deep venous thrombosis and pulmonary embolus.  Pathology report 11/28/2022.  Fallopian tube and ovary, left salpingo-oophorectomy: Fallopian tube with no evidence of involvement by malignancy.  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  No surface involvement demonstrated.  Uterus,  fallopian tube and ovary, hysterectomy with right salpingo-oophorectomy: Cervix and endocervix with no evidence of squamous or glandular dysplasia.  Chronic cervicitis present.  Weakly proliferative endometrium, negative for hyperplasia, carcinoma, and endometritis.  Benign endometrial polyp present.  Adenomyosis.  No leiomyomata.  Right fallopian tube with focal involvement by serous neoplasm with Samona by this most consistent with low-grade serous carcinoma.  Remnant left ovary with adhesions to uterine serosa and focal involvement by low-grade serous carcinoma.  Right ovary with surface involvement by low-grade serous carcinoma.  Omentum biopsy: Involved by low-grade serous carcinoma, invasive).  Sidewall, left pelvic biopsy: Edema with hemorrhage and chronic inflammation, negative for endometriosis and malignancy.  Lymph node left pelvic biopsy: 1 lymph node with a 0.9 mm focus of metastatic serous carcinoma surrounding adipose tissue with hemorrhage and edema.           PREVIOUS INTERVENTIONS:  IVC filter was placed 11/22/2022 at [place].  She had undergone exploratory laparotomy, total abdominal hysterectomy, bilateral salpingo-oophorectomy, debulking, left pelvic lymph node dissection and omentectomy by Dr. Hayward on 11/23/2022.  Estimated blood loss 400 ml.  Laparotomy showed a massive mass arising from the left ovary, retroperitoneal, infiltrating the retroperitoneal space and adherent to the sigmoid mesentery.  Within the retroperitoneum it was densely adherent to the iliac vessel on the left side and ureter.  Deliberate rupture of the mass was required in order to access the retroperitoneal attachment.  The mass was sent for frozen section and determined to be at least borderline tumor possibly low-grade serous tumor.  There was no other evidence of disease in the pelvis or upper abdomen.  Due to being densely adherent to the retroperitoneum and iliac vessels, it is unclear whether the entire tumor  was removed or not and the pelvic sidewall biopsy was taken to determine whether the residual ovary was present and not a fibrotic reaction.  Bilateral ureterolysis was required.   Hypersensitivity reaction to Taxol 01/09/2023.   Adjuvant paclitaxel and carboplatin 01/10/2023 through 4/28/2023, 6 cycles.      Ovarian cancer   12/15/2022 Initial Diagnosis    Ovarian cancer (HCC)     1/9/2023 -  Chemotherapy    OP OVARIAN PACLitaxel / CARBOplatin (Q21D)       1/9/2023 -  Chemotherapy    OP CENTRAL VENOUS ACCESS DEVICE ACCESS, CARE, AND MAINTENANCE (CVAD)       1/20/2023 Cancer Staged    Staging form: Ovary, Fallopian Tube, And Primary Peritoneal Carcinoma, AJCC 8th Edition  - Pathologic stage from 1/20/2023: FIGO Stage III (pT3, pN1, cM0) - Signed by Walker Magana MD on 1/20/2023     2/15/2023 - 2/15/2023 Chemotherapy    OP MAGNESIUM            PAST MEDICAL HISTORY:  ALLERGIES:  No Known Allergies  CURRENT MEDICATIONS:  Outpatient Encounter Medications as of 8/23/2023   Medication Sig Dispense Refill    Calcium Carbonate-Vit D-Min (CALCIUM 1200 PO) Take  by mouth.      Eliquis 5 MG tablet tablet 1 tablet Every 12 (Twelve) Hours.      lidocaine-prilocaine (EMLA) 2.5-2.5 % cream Apply to port site 30 minutes prior to being accessed. 30 g 0    lisinopril-hydrochlorothiazide (PRINZIDE,ZESTORETIC) 20-12.5 MG per tablet Take 1 tablet by mouth Daily.      magnesium oxide (MAG-OX) 400 MG tablet Take 1 tablet by mouth 2 (Two) Times a Day. 6 tablet 0    vitamin D3 125 MCG (5000 UT) capsule capsule Take  by mouth Daily.      [DISCONTINUED] dexamethasone (DECADRON) 4 MG tablet Take 1 pill night before chemo and morning of chemo. 10 tablet 2    [DISCONTINUED] ferrous sulfate 325 (65 FE) MG tablet Take 1 tablet by mouth Daily With Breakfast. 60 tablet 2    [DISCONTINUED] HYDROcodone-acetaminophen (NORCO) 7.5-325 MG per tablet Take 1 tablet by mouth Every 4 (Four) Hours As Needed for Moderate Pain (Pain). 15 tablet 0     [DISCONTINUED] ondansetron (Zofran) 8 MG tablet Take 1 tablet by mouth Every 8 (Eight) Hours As Needed for Nausea or Vomiting. 60 tablet 2    [DISCONTINUED] prochlorperazine (COMPAZINE) 10 MG tablet Take 1 tablet by mouth Every 6 (Six) Hours As Needed for Nausea. 60 tablet 2     No facility-administered encounter medications on file as of 8/23/2023.     ADULT ILLNESSES:  Patient Active Problem List   Diagnosis Code    Ovarian cancer C56.9     SURGERIES:  Past Surgical History:   Procedure Laterality Date    VENA CAVA FILTER INSERTION      VENOUS ACCESS DEVICE (PORT) INSERTION N/A 12/30/2022    Procedure: INSERTION VENOUS ACCESS DEVICE;  Surgeon: Holley Mcgill MD;  Location: [facility] OR;  Service: General;  Laterality: N/A;     HEALTH MAINTENANCE ITEMS:  Health Maintenance Due   Topic Date Due    MAMMOGRAM  Never done    COLORECTAL CANCER SCREENING  Never done    TDAP/TD VACCINES (2 - Tdap) 02/24/2008    ZOSTER VACCINE (1 of 2) Never done    COVID-19 Vaccine (4 - Moderna series) 03/24/2022    HEPATITIS C SCREENING  Never done    ANNUAL PHYSICAL  Never done    PAP SMEAR  Never done       <no information>  Last Completed Colonoscopy       This patient has no relevant Health Maintenance data.          Immunization History   Administered Date(s) Administered    COVID-19 (MODERNA) 1st,2nd,3rd Dose Monovalent 07/29/2021, 08/26/2021    COVID-19 (MODERNA) Monovalent Original Booster 01/27/2022     Last Completed Mammogram       This patient has no relevant Health Maintenance data.              FAMILY HISTORY:  No family history on file.  SOCIAL HISTORY:  Social History     Socioeconomic History    Marital status:    Tobacco Use    Smoking status: Never   Vaping Use    Vaping Use: Never used   Substance and Sexual Activity    Alcohol use: Never    Drug use: Never    Sexual activity: Defer       REVIEW OF SYSTEMS:    Review of Systems   Constitutional:  Negative for fatigue, fever and unexpected  "weight change.        Left hip pain.    HENT:  Negative for congestion, mouth sores and trouble swallowing.    Eyes:  Negative for redness and visual disturbance.   Respiratory:  Negative for cough, shortness of breath and wheezing.    Cardiovascular:  Negative for chest pain and leg swelling.   Gastrointestinal:  Negative for abdominal pain, nausea and vomiting.   Endocrine: Negative for polydipsia and polyphagia.   Genitourinary:  Negative for difficulty urinating, dysuria and flank pain.   Musculoskeletal:  Negative for gait problem and myalgias.   Skin:  Negative for pallor.   Allergic/Immunologic: Negative for food allergies.   Neurological:  Negative for dizziness and weakness.        \"Hands are fine but my toes are tingly and not as sensitive.\"   Hematological:  Negative for adenopathy. Does not bruise/bleed easily.   Psychiatric/Behavioral:  Negative for agitation, confusion and hallucinations.          VITAL SIGNS: /68   Pulse 70   Temp 97.2 øF (36.2 øC)   Resp 18   Ht 170.2 cm (67\")   Wt 84.6 kg (186 lb 8 oz)   SpO2 96%   Breastfeeding No   BMI 29.21 kg/mý  Gained 11 pounds.   Pain Score    08/23/23 0810   PainSc: 0-No pain       PHYSICAL EXAMINATION:     Physical Exam  Vitals reviewed.   Constitutional:       General: She is not in acute distress.  HENT:      Head: Normocephalic and atraumatic.   Eyes:      General: No scleral icterus.  Cardiovascular:      Rate and Rhythm: Normal rate.   Pulmonary:      Effort: No respiratory distress.      Breath sounds: No wheezing or rales.      Comments: Port, left. No erythema.   Abdominal:      General: Bowel sounds are normal.      Palpations: Abdomen is soft.      Tenderness: There is no abdominal tenderness.   Musculoskeletal:         General: No swelling.      Cervical back: Neck supple.   Skin:     General: Skin is warm.      Coloration: Skin is not pale.   Neurological:      Mental Status: She is alert and oriented to person, place, and time. "   Psychiatric:         Mood and Affect: Mood normal.         Behavior: Behavior normal.         Thought Content: Thought content normal.         Judgment: Judgment normal.       LABS    Lab Results - Last 18 Months   Lab Units 08/16/23  0802 06/21/23  0750 04/28/23  0754 04/07/23  0831 03/15/23  1258 02/24/23  0757   HEMOGLOBIN g/dL 12.0 11.4* 8.9* 9.1* 9.4* 10.1*   HEMATOCRIT % 37.0 34.9 26.8* 27.4* 28.6* 30.9*   MCV fL 91.1 101.2* 103.1* 96.8 92.6 89.3   WBC 10*3/mm3 7.00 5.88 6.48 6.85 7.13 9.62   RDW % 12.5 12.7 17.2* 19.3* 17.8* 16.0*   MPV fL 9.4 9.5 9.6 9.4 9.4 10.1   PLATELETS 10*3/mm3 207 154 100* 104* 110* 140   IMM GRAN % % 0.1 0.2 0.5 0.4 0.3 0.3   NEUTROS ABS 10*3/mm3 4.14 3.42 5.85 5.79 4.48 8.21*   LYMPHS ABS 10*3/mm3 2.02 1.54 0.49* 0.70 1.97 0.88   MONOS ABS 10*3/mm3 0.73 0.67 0.11 0.32 0.63 0.49   EOS ABS 10*3/mm3 0.07 0.20 0.00 0.00 0.01 0.00   BASOS ABS 10*3/mm3 0.03 0.04 0.00 0.01 0.02 0.01   IMMATURE GRANS (ABS) 10*3/mm3 0.01 0.01 0.03 0.03 0.02 0.03   NRBC /100 WBC 0.0 0.0 0.0 0.0 0.0 0.0       Lab Results - Last 18 Months   Lab Units 08/16/23  0802 06/21/23  0750 06/12/23  1309 05/12/23  1444 04/28/23  0754 04/07/23  0831 03/15/23  1257 02/24/23  0757   GLUCOSE mg/dL 101* 78  --   --  125* 112* 83 140*   SODIUM mmol/L 140 143  --   --  137 137 140 138   POTASSIUM mmol/L 4.1 4.2  --   --  4.6 4.2 4.2 4.3   CO2 mmol/L 28.0 28.0  --   --  22.0 24.0 25.0 23.0   CHLORIDE mmol/L 104 105  --   --  102 101 103 103   ANION GAP mmol/L 8.0 10.0  --   --  13.0 12.0 12.0 12.0   CREATININE mg/dL 1.50* 1.29* 1.40* 1.40* 1.26* 1.15* 1.13* 1.06*   BUN mg/dL 24* 27*  --   --  27* 25* 19 24*   BUN / CREAT RATIO  16.0 20.9  --   --  21.4 21.7 16.8 22.6   CALCIUM mg/dL 10.4 10.0  --   --  10.1 10.0 9.8 10.1   ALK PHOS U/L 71 66  --   --  84 88 82 86   TOTAL PROTEIN g/dL 7.2 7.1  --   --  7.2 7.2 6.9 7.1   ALT (SGPT) U/L 17 14  --   --  15 21 21 22   AST (SGOT) U/L 16 16  --   --  15 18 18 18   BILIRUBIN mg/dL 0.2  "0.2  --   --  0.3 0.2 0.2 0.3   ALBUMIN g/dL 4.4 4.2  --   --  4.6 4.3 4.4 4.4   GLOBULIN gm/dL 2.8 2.9  --   --  2.6 2.9 2.5 2.7       No results for input(s): MSPIKE, KAPPALAMB, IGLFLC, URICACID, FREEKAPPAL, CEA, LDH, REFLABREPO in the last 26907 hours.    Lab Results - Last 18 Months   Lab Units 06/21/23  0750 01/09/23  0845   IRON mcg/dL 76 35*   TIBC mcg/dL 325 308   IRON SATURATION (TSAT) % 23 11*   FERRITIN ng/mL 262.30* 379.80*       Tiff Elizalde reports a pain score of 0.        ASSESSMENT:  1.  Ovarian cancer, low-grade serous carcinoma, left.  Tumor size 18 cm. Negative genetic abnormality.   AJCC stage:IIIA1 (pT3, pN1, cM0, G1)  Treatment status: Postadjuvant paclitaxel and carboplatin 01/10/2023 through 4/28/2023, 6 cycles. Adjuvant letrozole 2.5 mg 6/2023 through present by Dr. Hayward.  2.  Performance status of 0.  3.  Left deep venous thrombosis from malignancy.  On therapy with apixaban.  4.  Pulmonary embolism from malignancy.  Undergoing treatment with apixaban.  Post IVC filter 11/22/2022.  5.  Anemia from iron deficiency and chronic kidney disease stage IIIa, GFR 59.3 ml/min on 2/3/2023. Oral iron 01/10/2023 through 6/21/2023.    6.  Hypersensitivity reaction to paclitaxel on 01/09/2023. Responded to dexamethasone as premed.   7.  Grade 1 neuropathy. Under observation. Gabapentin on hold.    8.  RIGHT lower lobe 3 mm pulmonary nodule. Observation.  9.  RIGHT thyroid with a 4 cm heterogeneous nodule. Per PCP.  10. Nodule located in the segment 4 a of the liver measures 7 mm on 5/12/2023, MRI abdomen on 6/12/2023 showed benign cyst.        PLAN:  1.   Re: Venous Doppler report 8/16/2023.  There is evidence of chronic partial deep venous thrombosis of the left lower extremity.  2.   Re: Tolerance to oral iron.  \"Okay\"  3.   Re: Heme status.  WBC 7, hemoglobin 12, hematocrit 37, MCV 91.1 and platelet 207.   4.   Re: CMP.  GFR 40 from 47.9 mL per minute   5.   Re: " "Magnesium 1.9.  at 38.9 from 43 from 33. Negative CT scan 5/12/2023.   6.   Re: MRI abdomen on 6/12/2023.3.2 cm segment 3 liver cyst without solid components or enhancement. 1 cm segment IVb cystic liver lesion with multiple thin septations but no  mural nodularity or enhancement. Favor both of these lesions to be benign cysts but recommend a follow-up ultrasound in 2-3 months to evaluate for stability of the smaller septated cystic lesion  7.   Re: Abdominal sonogram report 8/16/2023. Cystic liver lesions, as discussed above. No suspicious appearing solid lesion in the liver is identified.  No gallstones or ductal dilatation.  Echogenic pancreas can be a normal variant. It can also be seen in patients with diabetes and prediabetes.  8.   Re: Restaging scans for elevated .  9.   Re: Stable for observation, ovarian cancer.   10.  Plan to re-evaluate IVC at [facility].\"They will call me.\"   11.  Flush port every 6 weeks.  12.  eRx for Zofran 8 mg p.o. every 8 hours as needed for nausea and vomiting #60, 2 refills if needed.  13.  eRx gabapentin 300 mg po three times daily, 90, 2 refills if needed.  Observe for lightheadedness, dizziness or hypotension.  14.  eRx letrozole 2.5 mg po daily, 90, 3 refills if needed. Observe for hot flashes and bone loss.   15.  Continue ongoing management per primary care physician and other specialists.  16.  Plan of care discussed with patient and Dennis.  Understanding expressed.  Patient agreeable to proceed.  17. Questions were answered to her satisfaction. \"Right.\"  18.  She will be seen every 2 to 4 months for the first 2 years then every 3 to 6 months for the next 3 years.  Annually after 5 years.  Imaging as clinically indicated.  19.  Order CT chest, abdomen and pelvis for elevated .  20. Order bone density. She is on letrozole.    21.  Return to office in 12 weeks with CBC with differential, CMP, , and pre office left leg venous " doppler.          I have reviewed the assessment and plan and verified the accuracy of it. No changes to assessment and plan since the information was documented. Walker Magana MD 08/23/23         I spent 40 total minutes, face-to-face, caring for Tiff today. Greater than 50% of this time involved counseling and/or coordination of care as documented within this note.              (Holley Mcgill MD)  MD Tyrone Galindo MD

## 2023-08-25 ENCOUNTER — TELEPHONE (OUTPATIENT)
Dept: ONCOLOGY | Facility: CLINIC | Age: 58
End: 2023-08-25

## 2023-08-25 NOTE — TELEPHONE ENCOUNTER
Called patient back, she states that she is to have a CT scan and bone density.  She has the money for the bone density part , but not for the CT scan.  She knows that her tumor marker did come back slightly elevated this time, but she is requesting to hold off on the CT scan and have the tumor marker repeated in a month and if it is still elevated, she will have the CT scan then.  She will have time to save us some more money in that time as well.  We will cancel the CT scan and wait for the tumor marker in a month.

## 2023-08-25 NOTE — TELEPHONE ENCOUNTER
Caller: Tiff Elizalde    Relationship: Self    Best call back number: [phone]    What is the best time to reach you: ANYTIME    Who are you requesting to speak with (clinical staff, provider,  specific staff member): TIM       What was the call regarding:     FOLLOW UP QUESTION REGARDING VISIT FROM WEDNESDAY JUST NEEDING TO SPEAK WITH TIM ABOUT.     Is it okay if the provider responds through MyChart: NO

## 2023-09-01 ENCOUNTER — APPOINTMENT (OUTPATIENT)
Dept: CT IMAGING | Facility: HOSPITAL | Age: 58
End: 2023-09-01
Payer: COMMERCIAL

## 2023-09-01 ENCOUNTER — HOSPITAL ENCOUNTER (OUTPATIENT)
Dept: BONE DENSITY | Facility: HOSPITAL | Age: 58
Discharge: HOME | End: 2023-09-01
Admitting: INTERNAL MEDICINE
Payer: COMMERCIAL

## 2023-09-01 DIAGNOSIS — Z78.0 MENOPAUSE: ICD-10-CM

## 2023-09-01 PROCEDURE — 77080 DXA BONE DENSITY AXIAL: CPT

## 2023-09-19 ENCOUNTER — TELEPHONE (OUTPATIENT)
Dept: ONCOLOGY | Facility: CLINIC | Age: 58
End: 2023-09-19

## 2023-09-19 NOTE — TELEPHONE ENCOUNTER
Caller: Tiff Elizalde    Relationship: Self    Best call back number: [phone]     What is the best time to reach you: ANYTIME    Who are you requesting to speak with (clinical staff, provider,  specific staff member): SCHEDULING        What was the call regarding: PT NOTICED APPT CHANGE FROM DR GIL TO DR MORALES ON 11/21, SHE WOULD PREFER TO SEE DR GIL, IF HE IS OUT THAT DAY, OK TO MOVE TO ANOTHER DAY.  PLEASE CALL PT TO RESCHEDULE WITH DR GIL.

## 2023-09-27 ENCOUNTER — LAB (OUTPATIENT)
Dept: LAB | Facility: HOSPITAL | Age: 58
End: 2023-09-27
Payer: COMMERCIAL

## 2023-09-27 ENCOUNTER — INFUSION (OUTPATIENT)
Dept: ONCOLOGY | Facility: CLINIC | Age: 58
End: 2023-09-27
Payer: COMMERCIAL

## 2023-09-27 DIAGNOSIS — C56.2 MALIGNANT NEOPLASM OF LEFT OVARY: Primary | ICD-10-CM

## 2023-09-27 DIAGNOSIS — C56.2 MALIGNANT NEOPLASM OF LEFT OVARY: ICD-10-CM

## 2023-09-27 LAB
ALBUMIN SERPL-MCNC: 4.1 G/DL (ref 3.5–5.2)
ALBUMIN/GLOB SERPL: 1.5 G/DL
ALP SERPL-CCNC: 69 U/L (ref 39–117)
ALT SERPL W P-5'-P-CCNC: 10 U/L (ref 1–33)
ANION GAP SERPL CALCULATED.3IONS-SCNC: 10 MMOL/L (ref 5–15)
AST SERPL-CCNC: 16 U/L (ref 1–32)
BASOPHILS # BLD AUTO: 0.04 10*3/MM3 (ref 0–0.2)
BASOPHILS NFR BLD AUTO: 0.5 % (ref 0–1.5)
BILIRUB SERPL-MCNC: 0.3 MG/DL (ref 0–1.2)
BUN SERPL-MCNC: 20 MG/DL (ref 6–20)
BUN/CREAT SERPL: 14.4 (ref 7–25)
CALCIUM SPEC-SCNC: 10 MG/DL (ref 8.6–10.5)
CHLORIDE SERPL-SCNC: 104 MMOL/L (ref 98–107)
CO2 SERPL-SCNC: 27 MMOL/L (ref 22–29)
CREAT SERPL-MCNC: 1.39 MG/DL (ref 0.57–1)
DEPRECATED RDW RBC AUTO: 41.1 FL (ref 37–54)
EGFRCR SERPLBLD CKD-EPI 2021: 43.8 ML/MIN/1.73
EOSINOPHIL # BLD AUTO: 0.06 10*3/MM3 (ref 0–0.4)
EOSINOPHIL NFR BLD AUTO: 0.8 % (ref 0.3–6.2)
ERYTHROCYTE [DISTWIDTH] IN BLOOD BY AUTOMATED COUNT: 12.6 % (ref 12.3–15.4)
GLOBULIN UR ELPH-MCNC: 2.7 GM/DL
GLUCOSE SERPL-MCNC: 69 MG/DL (ref 65–99)
HCT VFR BLD AUTO: 36.4 % (ref 34–46.6)
HGB BLD-MCNC: 11.7 G/DL (ref 12–15.9)
IMM GRANULOCYTES # BLD AUTO: 0.02 10*3/MM3 (ref 0–0.05)
IMM GRANULOCYTES NFR BLD AUTO: 0.3 % (ref 0–0.5)
LYMPHOCYTES # BLD AUTO: 1.89 10*3/MM3 (ref 0.7–3.1)
LYMPHOCYTES NFR BLD AUTO: 25.5 % (ref 19.6–45.3)
MAGNESIUM SERPL-MCNC: 1.9 MG/DL (ref 1.6–2.6)
MCH RBC QN AUTO: 28.7 PG (ref 26.6–33)
MCHC RBC AUTO-ENTMCNC: 32.1 G/DL (ref 31.5–35.7)
MCV RBC AUTO: 89.4 FL (ref 79–97)
MONOCYTES # BLD AUTO: 0.67 10*3/MM3 (ref 0.1–0.9)
MONOCYTES NFR BLD AUTO: 9 % (ref 5–12)
NEUTROPHILS NFR BLD AUTO: 4.73 10*3/MM3 (ref 1.7–7)
NEUTROPHILS NFR BLD AUTO: 63.9 % (ref 42.7–76)
NRBC BLD AUTO-RTO: 0 /100 WBC (ref 0–0.2)
PLATELET # BLD AUTO: 205 10*3/MM3 (ref 140–450)
PMV BLD AUTO: 9.4 FL (ref 6–12)
POTASSIUM SERPL-SCNC: 3.8 MMOL/L (ref 3.5–5.2)
PROT SERPL-MCNC: 6.8 G/DL (ref 6–8.5)
RBC # BLD AUTO: 4.07 10*6/MM3 (ref 3.77–5.28)
SODIUM SERPL-SCNC: 141 MMOL/L (ref 136–145)
WBC NRBC COR # BLD: 7.41 10*3/MM3 (ref 3.4–10.8)

## 2023-09-27 PROCEDURE — 85025 COMPLETE CBC W/AUTO DIFF WBC: CPT

## 2023-09-27 PROCEDURE — 83735 ASSAY OF MAGNESIUM: CPT

## 2023-09-27 PROCEDURE — 36415 COLL VENOUS BLD VENIPUNCTURE: CPT

## 2023-09-27 PROCEDURE — 80053 COMPREHEN METABOLIC PANEL: CPT

## 2023-09-27 RX ORDER — HEPARIN SODIUM (PORCINE) LOCK FLUSH IV SOLN 100 UNIT/ML 100 UNIT/ML
500 SOLUTION INTRAVENOUS AS NEEDED
OUTPATIENT
Start: 2023-09-27

## 2023-09-27 RX ORDER — SODIUM CHLORIDE 0.9 % (FLUSH) 0.9 %
10 SYRINGE (ML) INJECTION AS NEEDED
Status: DISCONTINUED | OUTPATIENT
Start: 2023-09-27 | End: 2023-09-27 | Stop reason: HOSPADM

## 2023-09-27 RX ORDER — HEPARIN SODIUM (PORCINE) LOCK FLUSH IV SOLN 100 UNIT/ML 100 UNIT/ML
500 SOLUTION INTRAVENOUS AS NEEDED
Status: DISCONTINUED | OUTPATIENT
Start: 2023-09-27 | End: 2023-09-27 | Stop reason: HOSPADM

## 2023-09-27 RX ORDER — SODIUM CHLORIDE 0.9 % (FLUSH) 0.9 %
10 SYRINGE (ML) INJECTION AS NEEDED
OUTPATIENT
Start: 2023-09-27

## 2023-09-27 RX ADMIN — HEPARIN SODIUM (PORCINE) LOCK FLUSH IV SOLN 100 UNIT/ML 500 UNITS: 100 SOLUTION at 09:22

## 2023-09-27 RX ADMIN — Medication 10 ML: at 09:22

## 2023-09-29 ENCOUNTER — TRANSCRIBE ORDERS (OUTPATIENT)
Dept: ADMINISTRATIVE | Facility: HOSPITAL | Age: 58
End: 2023-09-29
Payer: COMMERCIAL

## 2023-10-09 RX ORDER — LIDOCAINE AND PRILOCAINE 25; 25 MG/G; MG/G
CREAM TOPICAL
Qty: 30 G | Refills: 0 | Status: SHIPPED | OUTPATIENT
Start: 2023-10-09

## 2023-11-06 ENCOUNTER — TELEPHONE (OUTPATIENT)
Dept: VASCULAR SURGERY | Facility: CLINIC | Age: 58
End: 2023-11-06
Payer: COMMERCIAL

## 2023-11-06 NOTE — TELEPHONE ENCOUNTER
Call placed to patient and reminded patient of appointment on 11/7/2023 at 0800, patient verbalized understanding.

## 2023-11-07 ENCOUNTER — OFFICE VISIT (OUTPATIENT)
Dept: VASCULAR SURGERY | Facility: CLINIC | Age: 58
End: 2023-11-07
Payer: COMMERCIAL

## 2023-11-07 VITALS
HEART RATE: 68 BPM | BODY MASS INDEX: 29.82 KG/M2 | OXYGEN SATURATION: 98 % | WEIGHT: 190 LBS | HEIGHT: 67 IN | DIASTOLIC BLOOD PRESSURE: 78 MMHG | SYSTOLIC BLOOD PRESSURE: 134 MMHG

## 2023-11-07 DIAGNOSIS — Z95.828 PRESENCE OF IVC FILTER: Primary | ICD-10-CM

## 2023-11-07 DIAGNOSIS — C56.2 MALIGNANT NEOPLASM OF LEFT OVARY: ICD-10-CM

## 2023-11-07 PROCEDURE — 99204 OFFICE O/P NEW MOD 45 MIN: CPT | Performed by: NURSE PRACTITIONER

## 2023-11-07 RX ORDER — LETROZOLE 2.5 MG/1
2.5 TABLET, FILM COATED ORAL DAILY
COMMUNITY
Start: 2023-06-29

## 2023-11-07 NOTE — LETTER
November 7, 2023     Tyrone Garza MD  [address] [facility] [address]    Patient: Tiff Elizalde   YOB: 1965   Date of Visit: 11/7/2023     Dear Tyrone Garza MD:       Thank you for referring Tiff Elizalde to me for evaluation. Below are the relevant portions of my assessment and plan of care.    If you have questions, please do not hesitate to call me. I look forward to following Tiff along with you.         Sincerely,        All Adorno, DO        CC: MD Bharathi BojorqueznikhilmelizaJenn, APRN  11/07/23 0847  Sign when Signing Visit  11/07/2023      Virgilio Hayward MD  [address] [place] [address]  [address]  [place] [address]    Tiff Elizalde  1965    Chief Complaint   Patient presents with   • NEW PATIENT     Referred from Virgilio Hawyard for IVC removal, placed in [place] almost a year ago. Attempted to remove in August but was unable due to growth and positioning.        Dear Virgilio Hayward MD:      HPI  I had the pleasure of seeing your patient Tiff Elizalde in the office today.  Thank you kindly for this consultation.  As you recall, Tiff Elizalde is a 58 y.o.  female who you are currently following for surgical needs.  She has a history of ovarian cancer, DVT, and PE.  She was diagnosed in November 2022.  She underwent treatment and placement of IVC filter.  She is still on Eliquis currently per Dr. Magana.  She did have testing recently which I did review.     Past Medical History:   Diagnosis Date   • Blood clot in vein    • Goiter    • Leg fracture    • Ovarian cancer 12/15/2022       Past Surgical History:   Procedure Laterality Date   • HYSTERECTOMY     • TUMOR REMOVAL Left 11/2022   • VENA CAVA FILTER INSERTION     • VENOUS ACCESS DEVICE (PORT) INSERTION N/A 12/30/2022    Procedure: INSERTION VENOUS ACCESS DEVICE;  Surgeon: Holley Mcgill MD;  Location: [facility];  Service: General;  Laterality: N/A;       Family History   Problem Relation Age of Onset   •  "Hypertension Mother    • Multiple sclerosis Father    • Heart attack Maternal Grandfather    • Diabetes Paternal Grandmother    • Heart attack Paternal Grandfather        Social History     Socioeconomic History   • Marital status:    Tobacco Use   • Smoking status: Never   Vaping Use   • Vaping Use: Never used   Substance and Sexual Activity   • Alcohol use: Never   • Drug use: Never   • Sexual activity: Defer       No Known Allergies    Current Outpatient Medications   Medication Instructions   • Calcium Carbonate-Vit D-Min (CALCIUM 1200 PO) Oral   • Eliquis 5 mg, Every 12 Hours Scheduled   • letrozole (FEMARA) 2.5 mg, Oral, Daily   • lidocaine-prilocaine (EMLA) 2.5-2.5 % cream APPLY TOPICALLY TO PORT SITE 30 MINUTES PRIOR TO BEING ACCESSED   • lisinopril-hydrochlorothiazide (PRINZIDE,ZESTORETIC) 20-12.5 MG per tablet 1 tablet, Oral, Daily   • vitamin D3 125 MCG (5000 UT) capsule capsule Oral, Daily         Review of Systems   Constitutional: Negative.    HENT: Negative.     Eyes: Negative.    Respiratory: Negative.     Cardiovascular: Negative.    Gastrointestinal: Negative.    Endocrine: Negative.    Genitourinary: Negative.    Musculoskeletal: Negative.    Skin: Negative.    Allergic/Immunologic: Negative.    Neurological: Negative.    Hematological: Negative.    Psychiatric/Behavioral: Negative.         /78   Pulse 68   Ht 170.2 cm (67\")   Wt 86.2 kg (190 lb)   SpO2 98%   BMI 29.76 kg/m²   Physical Exam  Vitals and nursing note reviewed.   Constitutional:       General: She is not in acute distress.     Appearance: Normal appearance. She is well-developed. She is not diaphoretic.   HENT:      Head: Normocephalic and atraumatic.   Eyes:      General: No scleral icterus.     Pupils: Pupils are equal, round, and reactive to light.   Neck:      Thyroid: No thyromegaly.      Vascular: No carotid bruit or JVD.   Cardiovascular:      Rate and Rhythm: Normal rate and regular rhythm.      Pulses: " Normal pulses.      Heart sounds: Normal heart sounds and S2 normal. No murmur heard.     No friction rub. No gallop.   Pulmonary:      Effort: Pulmonary effort is normal.      Breath sounds: Normal breath sounds.   Abdominal:      General: Bowel sounds are normal.      Palpations: Abdomen is soft.   Musculoskeletal:         General: Normal range of motion.      Cervical back: Normal range of motion and neck supple.   Skin:     General: Skin is warm and dry.   Neurological:      General: No focal deficit present.      Mental Status: She is alert and oriented to person, place, and time.      Cranial Nerves: No cranial nerve deficit.   Psychiatric:         Mood and Affect: Mood normal.         Behavior: Behavior normal.         Thought Content: Thought content normal.         Judgment: Judgment normal.         Patient Active Problem List   Diagnosis   • Ovarian cancer         ICD-10-CM ICD-9-CM   1. Presence of IVC filter  Z95.828 V45.89   2. Malignant neoplasm of left ovary  C56.2 183.0           Plan: After thoroughly evaluating Tiff Elizalde, I believe the best course of action is to proceed with IVC filter removal.  I will attempt to retrieve filter.  Risks and benefits were explained in great length to the patient, which included but not limited to,bleeding, infection, vessel damage, nerve damage, vessel rupture, filter migration, filter perforation.  She will hold Eliquis for 2 days prior to procedure.  I would also recommend her port removed.  She is not undergoing treatment and states she would not do chemotherapy again.  If ever needed, this could be replaced.  This will reduce the risk of central venous stenosis, potential of SVC syndrome in the future. The patient can continue taking their current medication regimen as previously planned.  This was all discussed in full with complete understanding.    Thank you for allowing me to participate in the care of your patient.  Please do not hesitate with any  questions or concerns.  I will keep you aware of any further encounters with Tiff Elizalde.        Sincerely yours,         HAILEE Strauss

## 2023-11-07 NOTE — PROGRESS NOTES
11/07/2023      Virgilio Hayward MD  [address] [facility]  [address]  [place] [address]    Tiff Elizalde  1965    Chief Complaint   Patient presents with    NEW PATIENT     Referred from Virgilio Hayward for IVC removal, placed in [place] almost a year ago. Attempted to remove in August but was unable due to growth and positioning.        Dear Virgilio Hayward MD:      HPI  I had the pleasure of seeing your patient Tiff Elizalde in the office today.  Thank you kindly for this consultation.  As you recall, Tiff Elizalde is a 58 y.o.  female who you are currently following for surgical needs.  She has a history of ovarian cancer, DVT, and PE.  She was diagnosed in November 2022.  She underwent treatment and placement of IVC filter.  She is still on Eliquis currently per Dr. Magana.  She did have testing recently which I did review.     Past Medical History:   Diagnosis Date    Blood clot in vein     Goiter     Leg fracture     Ovarian cancer 12/15/2022       Past Surgical History:   Procedure Laterality Date    HYSTERECTOMY      TUMOR REMOVAL Left 11/2022    VENA CAVA FILTER INSERTION      VENOUS ACCESS DEVICE (PORT) INSERTION N/A 12/30/2022    Procedure: INSERTION VENOUS ACCESS DEVICE;  Surgeon: Holley Mcgill MD;  Location: [facility] OR;  Service: General;  Laterality: N/A;       Family History   Problem Relation Age of Onset    Hypertension Mother     Multiple sclerosis Father     Heart attack Maternal Grandfather     Diabetes Paternal Grandmother     Heart attack Paternal Grandfather        Social History     Socioeconomic History    Marital status:    Tobacco Use    Smoking status: Never   Vaping Use    Vaping Use: Never used   Substance and Sexual Activity    Alcohol use: Never    Drug use: Never    Sexual activity: Defer       No Known Allergies    Current Outpatient Medications   Medication Instructions    Calcium Carbonate-Vit D-Min (CALCIUM 1200 PO) Oral    Eliquis 5 mg, Every 12 Hours Scheduled     "letrozole (FEMARA) 2.5 mg, Oral, Daily    lidocaine-prilocaine (EMLA) 2.5-2.5 % cream APPLY TOPICALLY TO PORT SITE 30 MINUTES PRIOR TO BEING ACCESSED    lisinopril-hydrochlorothiazide (PRINZIDE,ZESTORETIC) 20-12.5 MG per tablet 1 tablet, Oral, Daily    vitamin D3 125 MCG (5000 UT) capsule capsule Oral, Daily         Review of Systems   Constitutional: Negative.    HENT: Negative.     Eyes: Negative.    Respiratory: Negative.     Cardiovascular: Negative.    Gastrointestinal: Negative.    Endocrine: Negative.    Genitourinary: Negative.    Musculoskeletal: Negative.    Skin: Negative.    Allergic/Immunologic: Negative.    Neurological: Negative.    Hematological: Negative.    Psychiatric/Behavioral: Negative.         /78   Pulse 68   Ht 170.2 cm (67\")   Wt 86.2 kg (190 lb)   SpO2 98%   BMI 29.76 kg/m²   Physical Exam  Vitals and nursing note reviewed.   Constitutional:       General: She is not in acute distress.     Appearance: Normal appearance. She is well-developed. She is not diaphoretic.   HENT:      Head: Normocephalic and atraumatic.   Eyes:      General: No scleral icterus.     Pupils: Pupils are equal, round, and reactive to light.   Neck:      Thyroid: No thyromegaly.      Vascular: No carotid bruit or JVD.   Cardiovascular:      Rate and Rhythm: Normal rate and regular rhythm.      Pulses: Normal pulses.      Heart sounds: Normal heart sounds and S2 normal. No murmur heard.     No friction rub. No gallop.   Pulmonary:      Effort: Pulmonary effort is normal.      Breath sounds: Normal breath sounds.   Abdominal:      General: Bowel sounds are normal.      Palpations: Abdomen is soft.   Musculoskeletal:         General: Normal range of motion.      Cervical back: Normal range of motion and neck supple.   Skin:     General: Skin is warm and dry.   Neurological:      General: No focal deficit present.      Mental Status: She is alert and oriented to person, place, and time.      Cranial Nerves: No " cranial nerve deficit.   Psychiatric:         Mood and Affect: Mood normal.         Behavior: Behavior normal.         Thought Content: Thought content normal.         Judgment: Judgment normal.         Patient Active Problem List   Diagnosis    Ovarian cancer         ICD-10-CM ICD-9-CM   1. Presence of IVC filter  Z95.828 V45.89   2. Malignant neoplasm of left ovary  C56.2 183.0           Plan: After thoroughly evaluating Tiff Elizalde, I believe the best course of action is to proceed with IVC filter removal.  I will attempt to retrieve filter.  Risks and benefits were explained in great length to the patient, which included but not limited to,bleeding, infection, vessel damage, nerve damage, vessel rupture, filter migration, filter perforation.  She will hold Eliquis for 2 days prior to procedure.  I would also recommend her port removed.  She is not undergoing treatment and states she would not do chemotherapy again.  If ever needed, this could be replaced.  This will reduce the risk of central venous stenosis, potential of SVC syndrome in the future. The patient can continue taking their current medication regimen as previously planned.  This was all discussed in full with complete understanding.    Thank you for allowing me to participate in the care of your patient.  Please do not hesitate with any questions or concerns.  I will keep you aware of any further encounters with Tiff Elizalde.        Sincerely yours,         HAILEE Strauss

## 2023-11-07 NOTE — LETTER
November 7, 2023     Virgilio Hayward MD  [address] [facility]  [address]  [facility] [address]    Patient: Tiff Elizalde   YOB: 1965   Date of Visit: 11/7/2023     Dear Virgilio Hayward MD:       Thank you for referring Tiff Elizalde to me for evaluation. Below are the relevant portions of my assessment and plan of care.    If you have questions, please do not hesitate to call me. I look forward to following Tiff along with you.         Sincerely,        All Adorno, DO        CC: No Recipients    Jenn Tran, APRN  11/07/23 0847  Sign when Signing Visit  11/07/2023      Virgilio Hayward MD  [address] [place] [address]    Tiff Elizalde  1965    Chief Complaint   Patient presents with   • NEW PATIENT     Referred from Virgilio Hayward for IVC removal, placed in [place] almost a year ago. Attempted to remove in August but was unable due to growth and positioning.        Dear Virgilio Hayward MD:      HPI  I had the pleasure of seeing your patient Tiff Elizalde in the office today.  Thank you kindly for this consultation.  As you recall, Tiff Elizalde is a 58 y.o.  female who you are currently following for surgical needs.  She has a history of ovarian cancer, DVT, and PE.  She was diagnosed in November 2022.  She underwent treatment and placement of IVC filter.  She is still on Eliquis currently per Dr. Magana.  She did have testing recently which I did review.     Past Medical History:   Diagnosis Date   • Blood clot in vein    • Goiter    • Leg fracture    • Ovarian cancer 12/15/2022       Past Surgical History:   Procedure Laterality Date   • HYSTERECTOMY     • TUMOR REMOVAL Left 11/2022   • VENA CAVA FILTER INSERTION     • VENOUS ACCESS DEVICE (PORT) INSERTION N/A 12/30/2022    Procedure: INSERTION VENOUS ACCESS DEVICE;  Surgeon: Holley Mcgill MD;  Location: [facility];  Service: General;  Laterality: N/A;       Family History   Problem Relation Age of Onset   •  "Hypertension Mother    • Multiple sclerosis Father    • Heart attack Maternal Grandfather    • Diabetes Paternal Grandmother    • Heart attack Paternal Grandfather        Social History     Socioeconomic History   • Marital status:    Tobacco Use   • Smoking status: Never   Vaping Use   • Vaping Use: Never used   Substance and Sexual Activity   • Alcohol use: Never   • Drug use: Never   • Sexual activity: Defer       No Known Allergies    Current Outpatient Medications   Medication Instructions   • Calcium Carbonate-Vit D-Min (CALCIUM 1200 PO) Oral   • Eliquis 5 mg, Every 12 Hours Scheduled   • letrozole (FEMARA) 2.5 mg, Oral, Daily   • lidocaine-prilocaine (EMLA) 2.5-2.5 % cream APPLY TOPICALLY TO PORT SITE 30 MINUTES PRIOR TO BEING ACCESSED   • lisinopril-hydrochlorothiazide (PRINZIDE,ZESTORETIC) 20-12.5 MG per tablet 1 tablet, Oral, Daily   • vitamin D3 125 MCG (5000 UT) capsule capsule Oral, Daily         Review of Systems   Constitutional: Negative.    HENT: Negative.     Eyes: Negative.    Respiratory: Negative.     Cardiovascular: Negative.    Gastrointestinal: Negative.    Endocrine: Negative.    Genitourinary: Negative.    Musculoskeletal: Negative.    Skin: Negative.    Allergic/Immunologic: Negative.    Neurological: Negative.    Hematological: Negative.    Psychiatric/Behavioral: Negative.         /78   Pulse 68   Ht 170.2 cm (67\")   Wt 86.2 kg (190 lb)   SpO2 98%   BMI 29.76 kg/m²   Physical Exam  Vitals and nursing note reviewed.   Constitutional:       General: She is not in acute distress.     Appearance: Normal appearance. She is well-developed. She is not diaphoretic.   HENT:      Head: Normocephalic and atraumatic.   Eyes:      General: No scleral icterus.     Pupils: Pupils are equal, round, and reactive to light.   Neck:      Thyroid: No thyromegaly.      Vascular: No carotid bruit or JVD.   Cardiovascular:      Rate and Rhythm: Normal rate and regular rhythm.      Pulses: " Normal pulses.      Heart sounds: Normal heart sounds and S2 normal. No murmur heard.     No friction rub. No gallop.   Pulmonary:      Effort: Pulmonary effort is normal.      Breath sounds: Normal breath sounds.   Abdominal:      General: Bowel sounds are normal.      Palpations: Abdomen is soft.   Musculoskeletal:         General: Normal range of motion.      Cervical back: Normal range of motion and neck supple.   Skin:     General: Skin is warm and dry.   Neurological:      General: No focal deficit present.      Mental Status: She is alert and oriented to person, place, and time.      Cranial Nerves: No cranial nerve deficit.   Psychiatric:         Mood and Affect: Mood normal.         Behavior: Behavior normal.         Thought Content: Thought content normal.         Judgment: Judgment normal.         Patient Active Problem List   Diagnosis   • Ovarian cancer         ICD-10-CM ICD-9-CM   1. Presence of IVC filter  Z95.828 V45.89   2. Malignant neoplasm of left ovary  C56.2 183.0           Plan: After thoroughly evaluating Tiff Elizalde, I believe the best course of action is to proceed with IVC filter removal.  I will attempt to retrieve filter.  Risks and benefits were explained in great length to the patient, which included but not limited to,bleeding, infection, vessel damage, nerve damage, vessel rupture, filter migration, filter perforation.  She will hold Eliquis for 2 days prior to procedure.  I would also recommend her port removed.  She is not undergoing treatment and states she would not do chemotherapy again.  If ever needed, this could be replaced.  This will reduce the risk of central venous stenosis, potential of SVC syndrome in the future. The patient can continue taking their current medication regimen as previously planned.  This was all discussed in full with complete understanding.    Thank you for allowing me to participate in the care of your patient.  Please do not hesitate with any  questions or concerns.  I will keep you aware of any further encounters with Tiff Elizalde.        Sincerely yours,         HAILEE Strauss

## 2023-11-08 ENCOUNTER — TELEPHONE (OUTPATIENT)
Dept: VASCULAR SURGERY | Facility: CLINIC | Age: 58
End: 2023-11-08
Payer: COMMERCIAL

## 2023-11-08 PROBLEM — Z95.828 PRESENCE OF IVC FILTER: Status: ACTIVE | Noted: 2023-11-07

## 2023-11-08 NOTE — TELEPHONE ENCOUNTER
Pt expressed understanding of prework/surgery time and instruction for procedure scheduled 11/17/23 with Dr. Adorno.  NPO after midnight. Pt to hold eliquis for 2 days prior to procedure.

## 2023-11-13 NOTE — H&P
11/12/2023         Virgilio Hayward MD  [address] [facility]  [address]  [place] [address]     Tiff Elizalde  1965          Chief Complaint   Patient presents with    NEW PATIENT       Referred from Virgilio Hayward for IVC removal, placed in [place] almost a year ago. Attempted to remove in August but was unable due to growth and positioning.          Dear Virgilio Hayward MD:        HPI  I had the pleasure of seeing your patient Tiff Elizalde in the office today.  Thank you kindly for this consultation.  As you recall, Tiff Elizalde is a 58 y.o.  female who you are currently following for surgical needs.  She has a history of ovarian cancer, DVT, and PE.  She was diagnosed in November 2022.  She underwent treatment and placement of IVC filter.  She is still on Eliquis currently per Dr. Magana.  She did have testing recently which I did review.      Medical History        Past Medical History:   Diagnosis Date    Blood clot in vein      Goiter      Leg fracture      Ovarian cancer 12/15/2022            Surgical History         Past Surgical History:   Procedure Laterality Date    HYSTERECTOMY        TUMOR REMOVAL Left 11/2022    VENA CAVA FILTER INSERTION        VENOUS ACCESS DEVICE (PORT) INSERTION N/A 12/30/2022     Procedure: INSERTION VENOUS ACCESS DEVICE;  Surgeon: Holley Mcgill MD;  Location: [facility] OR;  Service: General;  Laterality: N/A;                  Family History   Problem Relation Age of Onset    Hypertension Mother      Multiple sclerosis Father      Heart attack Maternal Grandfather      Diabetes Paternal Grandmother      Heart attack Paternal Grandfather           Social History   Social History           Socioeconomic History    Marital status:    Tobacco Use    Smoking status: Never   Vaping Use    Vaping Use: Never used   Substance and Sexual Activity    Alcohol use: Never    Drug use: Never    Sexual activity: Defer            No Known Allergies          Current Outpatient  "Medications   Medication Instructions    Calcium Carbonate-Vit D-Min (CALCIUM 1200 PO) Oral    Eliquis 5 mg, Every 12 Hours Scheduled    letrozole (FEMARA) 2.5 mg, Oral, Daily    lidocaine-prilocaine (EMLA) 2.5-2.5 % cream APPLY TOPICALLY TO PORT SITE 30 MINUTES PRIOR TO BEING ACCESSED    lisinopril-hydrochlorothiazide (PRINZIDE,ZESTORETIC) 20-12.5 MG per tablet 1 tablet, Oral, Daily    vitamin D3 125 MCG (5000 UT) capsule capsule Oral, Daily            Review of Systems   Constitutional: Negative.    HENT: Negative.     Eyes: Negative.    Respiratory: Negative.     Cardiovascular: Negative.    Gastrointestinal: Negative.    Endocrine: Negative.    Genitourinary: Negative.    Musculoskeletal: Negative.    Skin: Negative.    Allergic/Immunologic: Negative.    Neurological: Negative.    Hematological: Negative.    Psychiatric/Behavioral: Negative.           /78   Pulse 68   Ht 170.2 cm (67\")   Wt 86.2 kg (190 lb)   SpO2 98%   BMI 29.76 kg/m²   Physical Exam  Vitals and nursing note reviewed.   Constitutional:       General: She is not in acute distress.     Appearance: Normal appearance. She is well-developed. She is not diaphoretic.   HENT:      Head: Normocephalic and atraumatic.   Eyes:      General: No scleral icterus.     Pupils: Pupils are equal, round, and reactive to light.   Neck:      Thyroid: No thyromegaly.      Vascular: No carotid bruit or JVD.   Cardiovascular:      Rate and Rhythm: Normal rate and regular rhythm.      Pulses: Normal pulses.      Heart sounds: Normal heart sounds and S2 normal. No murmur heard.     No friction rub. No gallop.   Pulmonary:      Effort: Pulmonary effort is normal.      Breath sounds: Normal breath sounds.   Abdominal:      General: Bowel sounds are normal.      Palpations: Abdomen is soft.   Musculoskeletal:         General: Normal range of motion.      Cervical back: Normal range of motion and neck supple.   Skin:     General: Skin is warm and dry. "   Neurological:      General: No focal deficit present.      Mental Status: She is alert and oriented to person, place, and time.      Cranial Nerves: No cranial nerve deficit.   Psychiatric:         Mood and Affect: Mood normal.         Behavior: Behavior normal.         Thought Content: Thought content normal.         Judgment: Judgment normal.                Patient Active Problem List   Diagnosis    Ovarian cancer         Visit Diagnosis       ICD-10-CM ICD-9-CM   1. Presence of IVC filter  Z95.828 V45.89   2. Malignant neoplasm of left ovary  C56.2 183.0                  Plan: After thoroughly evaluating Tiff Elizalde, I believe the best course of action is to proceed with IVC filter removal.  I will attempt to retrieve filter.  Risks and benefits were explained in great length to the patient, which included but not limited to,bleeding, infection, vessel damage, nerve damage, vessel rupture, filter migration, filter perforation.  She will hold Eliquis for 2 days prior to procedure.  I would also recommend her port removed.  She is not undergoing treatment and states she would not do chemotherapy again.  If ever needed, this could be replaced.  This will reduce the risk of central venous stenosis, potential of SVC syndrome in the future. The patient can continue taking their current medication regimen as previously planned.  This was all discussed in full with complete understanding.     Thank you for allowing me to participate in the care of your patient.  Please do not hesitate with any questions or concerns.  I will keep you aware of any further encounters with Tiff Elizalde.           Sincerely yours,           All Adorno, DO

## 2023-11-14 ENCOUNTER — INFUSION (OUTPATIENT)
Dept: ONCOLOGY | Facility: CLINIC | Age: 58
End: 2023-11-14
Payer: COMMERCIAL

## 2023-11-14 ENCOUNTER — PRE-ADMISSION TESTING (OUTPATIENT)
Dept: PREADMISSION TESTING | Facility: HOSPITAL | Age: 58
End: 2023-11-14
Payer: COMMERCIAL

## 2023-11-14 ENCOUNTER — HOSPITAL ENCOUNTER (OUTPATIENT)
Dept: ULTRASOUND IMAGING | Facility: HOSPITAL | Age: 58
Discharge: HOME | End: 2023-11-14
Payer: COMMERCIAL

## 2023-11-14 ENCOUNTER — LAB (OUTPATIENT)
Dept: LAB | Facility: HOSPITAL | Age: 58
End: 2023-11-14
Payer: COMMERCIAL

## 2023-11-14 VITALS
BODY MASS INDEX: 30.21 KG/M2 | RESPIRATION RATE: 16 BRPM | DIASTOLIC BLOOD PRESSURE: 62 MMHG | OXYGEN SATURATION: 97 % | SYSTOLIC BLOOD PRESSURE: 134 MMHG | HEIGHT: 67 IN | WEIGHT: 192.46 LBS | HEART RATE: 67 BPM

## 2023-11-14 DIAGNOSIS — C56.2 MALIGNANT NEOPLASM OF LEFT OVARY: ICD-10-CM

## 2023-11-14 DIAGNOSIS — Z95.828 PRESENCE OF IVC FILTER: ICD-10-CM

## 2023-11-14 DIAGNOSIS — C56.2 MALIGNANT NEOPLASM OF LEFT OVARY: Primary | ICD-10-CM

## 2023-11-14 LAB
ALBUMIN SERPL-MCNC: 4.3 G/DL (ref 3.5–5.2)
ALBUMIN/GLOB SERPL: 1.5 G/DL
ALP SERPL-CCNC: 75 U/L (ref 39–117)
ALT SERPL W P-5'-P-CCNC: 14 U/L (ref 1–33)
ANION GAP SERPL CALCULATED.3IONS-SCNC: 10 MMOL/L (ref 5–15)
ANION GAP SERPL CALCULATED.3IONS-SCNC: 6 MMOL/L (ref 5–15)
APTT PPP: 28.9 SECONDS (ref 24.5–36)
AST SERPL-CCNC: 17 U/L (ref 1–32)
BASOPHILS # BLD AUTO: 0.04 10*3/MM3 (ref 0–0.2)
BASOPHILS # BLD AUTO: 0.05 10*3/MM3 (ref 0–0.2)
BASOPHILS NFR BLD AUTO: 0.5 % (ref 0–1.5)
BASOPHILS NFR BLD AUTO: 0.6 % (ref 0–1.5)
BILIRUB SERPL-MCNC: 0.2 MG/DL (ref 0–1.2)
BUN SERPL-MCNC: 22 MG/DL (ref 8–23)
BUN SERPL-MCNC: 23 MG/DL (ref 8–23)
BUN/CREAT SERPL: 16.9 (ref 7–25)
BUN/CREAT SERPL: 17.3 (ref 7–25)
CALCIUM SPEC-SCNC: 9.7 MG/DL (ref 8.6–10.5)
CALCIUM SPEC-SCNC: 9.8 MG/DL (ref 8.6–10.5)
CANCER AG125 SERPL QL: 35 U/ML (ref 0–38.1)
CHLORIDE SERPL-SCNC: 102 MMOL/L (ref 98–107)
CHLORIDE SERPL-SCNC: 103 MMOL/L (ref 98–107)
CO2 SERPL-SCNC: 30 MMOL/L (ref 22–29)
CO2 SERPL-SCNC: 30 MMOL/L (ref 22–29)
CREAT SERPL-MCNC: 1.27 MG/DL (ref 0.57–1)
CREAT SERPL-MCNC: 1.36 MG/DL (ref 0.57–1)
DEPRECATED RDW RBC AUTO: 39.5 FL (ref 37–54)
DEPRECATED RDW RBC AUTO: 39.6 FL (ref 37–54)
EGFRCR SERPLBLD CKD-EPI 2021: 44.7 ML/MIN/1.73
EGFRCR SERPLBLD CKD-EPI 2021: 48.5 ML/MIN/1.73
EOSINOPHIL # BLD AUTO: 0.04 10*3/MM3 (ref 0–0.4)
EOSINOPHIL # BLD AUTO: 0.05 10*3/MM3 (ref 0–0.4)
EOSINOPHIL NFR BLD AUTO: 0.5 % (ref 0.3–6.2)
EOSINOPHIL NFR BLD AUTO: 0.6 % (ref 0.3–6.2)
ERYTHROCYTE [DISTWIDTH] IN BLOOD BY AUTOMATED COUNT: 12.1 % (ref 12.3–15.4)
ERYTHROCYTE [DISTWIDTH] IN BLOOD BY AUTOMATED COUNT: 12.2 % (ref 12.3–15.4)
GLOBULIN UR ELPH-MCNC: 2.8 GM/DL
GLUCOSE SERPL-MCNC: 89 MG/DL (ref 65–99)
GLUCOSE SERPL-MCNC: 97 MG/DL (ref 65–99)
HCT VFR BLD AUTO: 36.4 % (ref 34–46.6)
HCT VFR BLD AUTO: 37 % (ref 34–46.6)
HGB BLD-MCNC: 12 G/DL (ref 12–15.9)
HGB BLD-MCNC: 12.1 G/DL (ref 12–15.9)
IMM GRANULOCYTES # BLD AUTO: 0.02 10*3/MM3 (ref 0–0.05)
IMM GRANULOCYTES # BLD AUTO: 0.03 10*3/MM3 (ref 0–0.05)
IMM GRANULOCYTES NFR BLD AUTO: 0.3 % (ref 0–0.5)
IMM GRANULOCYTES NFR BLD AUTO: 0.4 % (ref 0–0.5)
INR PPP: 1.05 (ref 0.91–1.09)
LYMPHOCYTES # BLD AUTO: 1.58 10*3/MM3 (ref 0.7–3.1)
LYMPHOCYTES # BLD AUTO: 1.88 10*3/MM3 (ref 0.7–3.1)
LYMPHOCYTES NFR BLD AUTO: 20.4 % (ref 19.6–45.3)
LYMPHOCYTES NFR BLD AUTO: 24.6 % (ref 19.6–45.3)
MCH RBC QN AUTO: 28.9 PG (ref 26.6–33)
MCH RBC QN AUTO: 29.2 PG (ref 26.6–33)
MCHC RBC AUTO-ENTMCNC: 32.7 G/DL (ref 31.5–35.7)
MCHC RBC AUTO-ENTMCNC: 33 G/DL (ref 31.5–35.7)
MCV RBC AUTO: 88.5 FL (ref 79–97)
MCV RBC AUTO: 88.6 FL (ref 79–97)
MONOCYTES # BLD AUTO: 0.7 10*3/MM3 (ref 0.1–0.9)
MONOCYTES # BLD AUTO: 0.73 10*3/MM3 (ref 0.1–0.9)
MONOCYTES NFR BLD AUTO: 9.2 % (ref 5–12)
MONOCYTES NFR BLD AUTO: 9.4 % (ref 5–12)
NEUTROPHILS NFR BLD AUTO: 4.97 10*3/MM3 (ref 1.7–7)
NEUTROPHILS NFR BLD AUTO: 5.3 10*3/MM3 (ref 1.7–7)
NEUTROPHILS NFR BLD AUTO: 64.9 % (ref 42.7–76)
NEUTROPHILS NFR BLD AUTO: 68.6 % (ref 42.7–76)
NRBC BLD AUTO-RTO: 0 /100 WBC (ref 0–0.2)
NRBC BLD AUTO-RTO: 0 /100 WBC (ref 0–0.2)
PLATELET # BLD AUTO: 194 10*3/MM3 (ref 140–450)
PLATELET # BLD AUTO: 197 10*3/MM3 (ref 140–450)
PMV BLD AUTO: 9.3 FL (ref 6–12)
PMV BLD AUTO: 9.6 FL (ref 6–12)
POTASSIUM SERPL-SCNC: 3.9 MMOL/L (ref 3.5–5.2)
POTASSIUM SERPL-SCNC: 4.4 MMOL/L (ref 3.5–5.2)
PROT SERPL-MCNC: 7.1 G/DL (ref 6–8.5)
PROTHROMBIN TIME: 13.8 SECONDS (ref 11.8–14.8)
RBC # BLD AUTO: 4.11 10*6/MM3 (ref 3.77–5.28)
RBC # BLD AUTO: 4.18 10*6/MM3 (ref 3.77–5.28)
SODIUM SERPL-SCNC: 139 MMOL/L (ref 136–145)
SODIUM SERPL-SCNC: 142 MMOL/L (ref 136–145)
WBC NRBC COR # BLD: 7.65 10*3/MM3 (ref 3.4–10.8)
WBC NRBC COR # BLD: 7.74 10*3/MM3 (ref 3.4–10.8)

## 2023-11-14 PROCEDURE — 85025 COMPLETE CBC W/AUTO DIFF WBC: CPT

## 2023-11-14 PROCEDURE — 85730 THROMBOPLASTIN TIME PARTIAL: CPT

## 2023-11-14 PROCEDURE — 93010 ELECTROCARDIOGRAM REPORT: CPT | Performed by: INTERNAL MEDICINE

## 2023-11-14 PROCEDURE — 86304 IMMUNOASSAY TUMOR CA 125: CPT

## 2023-11-14 PROCEDURE — 85610 PROTHROMBIN TIME: CPT

## 2023-11-14 PROCEDURE — 93005 ELECTROCARDIOGRAM TRACING: CPT

## 2023-11-14 PROCEDURE — 80053 COMPREHEN METABOLIC PANEL: CPT

## 2023-11-14 PROCEDURE — 93971 EXTREMITY STUDY: CPT

## 2023-11-14 PROCEDURE — 36415 COLL VENOUS BLD VENIPUNCTURE: CPT

## 2023-11-14 RX ORDER — SODIUM CHLORIDE 0.9 % (FLUSH) 0.9 %
10 SYRINGE (ML) INJECTION AS NEEDED
OUTPATIENT
Start: 2023-11-14

## 2023-11-14 RX ORDER — SODIUM CHLORIDE 0.9 % (FLUSH) 0.9 %
10 SYRINGE (ML) INJECTION AS NEEDED
Status: DISCONTINUED | OUTPATIENT
Start: 2023-11-14 | End: 2023-11-14 | Stop reason: HOSPADM

## 2023-11-14 RX ORDER — HEPARIN SODIUM (PORCINE) LOCK FLUSH IV SOLN 100 UNIT/ML 100 UNIT/ML
500 SOLUTION INTRAVENOUS AS NEEDED
Status: DISCONTINUED | OUTPATIENT
Start: 2023-11-14 | End: 2023-11-14 | Stop reason: HOSPADM

## 2023-11-14 RX ORDER — HEPARIN SODIUM (PORCINE) LOCK FLUSH IV SOLN 100 UNIT/ML 100 UNIT/ML
500 SOLUTION INTRAVENOUS AS NEEDED
OUTPATIENT
Start: 2023-11-14

## 2023-11-14 RX ADMIN — HEPARIN SODIUM (PORCINE) LOCK FLUSH IV SOLN 100 UNIT/ML 500 UNITS: 100 SOLUTION at 09:16

## 2023-11-14 RX ADMIN — Medication 10 ML: at 09:16

## 2023-11-14 NOTE — DISCHARGE INSTRUCTIONS
Before you come to the hospital        Arrival time: AS DIRECTED BY OFFICE     YOU MAY TAKE THE FOLLOWING MEDICATION(S) THE MORNING OF SURGERY WITH A SIP OF WATER: AS MD HAS DIRECTED; HOLD LISINOPRIL-HTZ 24 HOURS PRIOR TO SURGERY            ALL OTHER HOME MEDICATION CHECK WITH YOUR PHYSICIAN (especially if   you are taking diabetes medicines or blood thinners)    Do not take any Erectile Dysfunction medications (EX: CIALIS, VIAGRA) 24 hours prior to surgery.      If you were given and instructed to use a germ- killing soap, use as directed the night before surgery and again the morning of surgery or as directed by your surgeon. (Use one-half of the bottle with each shower.)   See attached information for How to Use Chlorhexidine for Bathing if applicable.            Eating and drinking restrictions prior to scheduled arrival time    2 Hours before arrival time STOP   Drinking Clear liquids (water, black coffee-NO CREAM,  apple juice-no pulp)    Clear Liquids    Water and flavored water                                                                      Clear Fruit juices, such as cranberry juice and apple juice.  Black coffee (NO cream of any kind, including powdered).  Plain tea  Clear bouillon or broth.  Flavored gelatin.  Soda.  Gatorade or Powerade.    8 Hours before arrival time STOP   All food, full liquids, and dairy products  Full liquid examples  Juices that have pulp.  Frozen ice pops that contain fruit pieces.  Coffee with creamer  Milk.  Yogurt.    (It is extremely important that you follow these guidelines to prevent delay or cancelation of your procedure)                       MANAGING PAIN AFTER SURGERY    We know you are probably wondering what your pain will be like after surgery.  Following surgery it is unrealistic to expect you will not have pain.   Pain is how our bodies let us know that something is wrong or cautions us to be careful.  That said, our goal is to make your pain  tolerable.    Methods we may use to treat your pain include (oral or IV medications, PCAs, epidurals, nerve blocks, etc.)   While some procedures require IV pain medications for a short time after surgery, transitioning to pain medications by mouth allows for better management of pain.   Your nurse will encourage you to take oral pain medications whenever possible.  IV medications work almost immediately, but only last a short while.  Taking medications by mouth allows for a more constant level of medication in your blood stream for a longer period of time.      Once your pain is out of control it is harder to get back under control.  It is important you are aware when your next dose of pain medication is due.  If you are admitted, your nurse may write the time of your next dose on the white board in your room to help you remember.      We are interested in your pain and encourage you to inform us about aggravating factors during your visit.   Many times a simple repositioning every few hours can make a big difference.    If your physician says it is okay, do not let your pain prevent you from getting out of bed. Be sure to call your nurse for assistance prior to getting up so you do not fall.      Before surgery, please decide your tolerable pain goal.  These faces help describe the pain ratings we use on a 0-10 scale.   Be prepared to tell us your goal and whether or not you take pain or anxiety medications at home.          Preparing for Surgery  Preparing for surgery is an important part of your care. It can make things go more smoothly and help you avoid complications. The steps leading up to surgery may vary among hospitals. Follow all instructions given to you by your health care providers. Ask questions if you do not understand something. Talk about any concerns that you have.  Here are some questions to consider asking before your surgery:  If my surgery is not an emergency (is elective), when would be the  best time to have the surgery?  What arrangements do I need to make for work, home, or school?  What will my recovery be like? How long will it be before I can return to normal activities?  Will I need to prepare my home? Will I need to arrange care for me or my children?  Should I expect to have pain after surgery? What are my pain management options? Are there nonmedical options that I can try for pain?  Tell a health care provider about:  Any allergies you have.  All medicines you are taking, including vitamins, herbs, eye drops, creams, and over-the-counter medicines.  Any problems you or family members have had with anesthetic medicines.  Any blood disorders you have.  Any surgeries you have had.  Any medical conditions you have.  Whether you are pregnant or may be pregnant.  What are the risks?  The risks and complications of surgery depend on the specific procedure that you have. Discuss all the risks with your health care providers before your surgery. Ask about common surgical complications, which may include:  Infection.  Bleeding or a need for blood replacement (transfusion).  Allergic reactions to medicines.  Damage to surrounding nerves, tissues, or structures.  A blood clot.  Scarring.  Failure of the surgery to correct the problem.  Follow these instructions before the procedure:  Several days or weeks before your procedure  You may have a physical exam by your primary health care provider to make sure it is safe for you to have surgery.  You may have testing. This may include a chest X-ray, blood and urine tests, electrocardiogram (ECG), or other testing.  Ask your health care provider about:  Changing or stopping your regular medicines. This is especially important if you are taking diabetes medicines or blood thinners.  Taking medicines such as aspirin and ibuprofen. These medicines can thin your blood. Do not take these medicines unless your health care provider tells you to take them.  Taking  over-the-counter medicines, vitamins, herbs, and supplements.  Do not use any products that contain nicotine or tobacco, such as cigarettes and e-cigarettes. If you need help quitting, ask your health care provider.  Avoid alcohol.  Ask your health care provider if there are exercises you can do to prepare for surgery.  Eat a healthy diet.   Plan to have someone 18 years of age or older to take you home from the hospital. We will need to verify your ride on the morning of surgery if you are being discharged home on the same day. Tell your ride to be expecting a call from the hospital prior to your procedure.   Plan to have a responsible adult care for you for at least 24 hours after you leave the hospital or clinic. This is important.  The day before your procedure  You may be given antibiotic medicine to take by mouth to help prevent infection. Take it as told by your health care provider.  You may be asked to shower with a germ-killing soap.  Follow instructions from your health care provider about eating and drinking restrictions. This includes gum, mints and hard candy.  Pack comfortable clothes according to your procedure.   The day of your procedure  You may need to take another shower with a germ-killing soap before you leave home in the morning.  With a small sip of water, take only the medicines that you are told to take.  Remove all jewelry including rings.   Leave anything you consider valuable at home except hearing aids if needed.  You do not need to bring your home medications into the hospital.   Do not wear any makeup, nail polish, powder, deodorant, lotion, hair accessories, or anything on your skin or body except your clothes.  If you will be staying in the hospital, bring a case to hold your glasses, contacts, or dentures. You may also want to bring your robe and non-skid footwear.       (Do not use denture adhesives since you will be asked to remove them during  surgery).   If you wear oxygen at  home, bring it with you the day of surgery.  If instructed by your health care provider, bring your sleep apnea device with you on the day of your surgery (if this applies to you).  You may want to leave your suitcase and sleep apnea device in the car until after surgery.   Arrive at the hospital as scheduled.  Bring a friend or family member with you who can help to answer questions and be present while you meet with your health care provider.  At the hospital  When you arrive at the hospital:  Go to registration located at the main entrance of the hospital. You will be registered and given a beeper and a sticker sheet. Take the stickers to the Outpatient nurses desk and place in the black tray. This is to notify staff that you have arrived. Then return to the lobby to wait.   When your beeper lights up and vibrates proceed through the double doors, under the stairs, and a member of the Outpatient Surgery staff will escort you to your preoperative room.  You may have to wear compression sleeves. These help to prevent blood clots and reduce swelling in your legs.  An IV may be inserted into one of your veins.              In the operating room, you may be given one or more of the following:        A medicine to help you relax (sedative).        A medicine to numb the area (local anesthetic).        A medicine to make you fall asleep (general anesthetic).        A medicine that is injected into an area of your body to numb everything below the                      injection site (regional anesthetic).  You may be given an antibiotic through your IV to help prevent infection.  Your surgical site will be marked or identified.    Contact a health care provider if you:  Develop a fever of more than 100.4°F (38°C) or other feelings of illness during the 48 hours before your surgery.  Have symptoms that get worse.  Have questions or concerns about your surgery.  Summary  Preparing for surgery can make the procedure go more  smoothly and lower your risk of complications.  Before surgery, make a list of questions and concerns to discuss with your surgeon. Ask about the risks and possible complications.  In the days or weeks before your surgery, follow all instructions from your health care provider. You may need to stop smoking, avoid alcohol, follow eating restrictions, and change or stop your regular medicines.  Contact your surgeon if you develop a fever or other signs of illness during the few days before your surgery.  This information is not intended to replace advice given to you by your health care provider. Make sure you discuss any questions you have with your health care provider.  Document Revised: 12/21/2018 Document Reviewed: 10/23/2018  [employer] Patient Education © 2021 [employer]         How to Use Chlorhexidine Before Surgery  Chlorhexidine gluconate (CHG) is a germ-killing (antiseptic) solution that is used to clean the skin. It can get rid of the bacteria that normally live on the skin and can keep them away for about 24 hours. To clean your skin with CHG, you may be given:  A CHG solution to use in the shower or as part of a sponge bath.  A prepackaged cloth that contains CHG.  Cleaning your skin with CHG may help lower the risk for infection:  While you are staying in the intensive care unit of the hospital.  If you have a vascular access, such as a central line, to provide short-term or long-term access to your veins.  If you have a catheter to drain urine from your bladder.  If you are on a ventilator. A ventilator is a machine that helps you breathe by moving air in and out of your lungs.  After surgery.  What are the risks?  Risks of using CHG include:  A skin reaction.  Hearing loss, if CHG gets in your ears and you have a perforated eardrum.  Eye injury, if CHG gets in your eyes and is not rinsed out.  The CHG product catching fire.  Make sure that you avoid smoking and flames after applying CHG to your  skin.  Do not use CHG:  If you have a chlorhexidine allergy or have previously reacted to chlorhexidine.  On babies younger than 2 months of age.  How to use CHG solution  Use CHG only as told by your health care provider, and follow the instructions on the label.  Use the full amount of CHG as directed. Usually, this is one bottle.  During a shower    Follow these steps when using CHG solution during a shower (unless your health care provider gives you different instructions):  Start the shower.  Use your normal soap and shampoo to wash your face and hair.  Turn off the shower or move out of the shower stream.  Pour the CHG onto a clean washcloth. Do not use any type of brush or rough-edged sponge.  Starting at your neck, lather your body down to your toes. Make sure you follow these instructions:  If you will be having surgery, pay special attention to the part of your body where you will be having surgery. Scrub this area for at least 1 minute.  Do not use CHG on your head or face. If the solution gets into your ears or eyes, rinse them well with water.  Avoid your genital area.  Avoid any areas of skin that have broken skin, cuts, or scrapes.  Scrub your back and under your arms. Make sure to wash skin folds.  Let the lather sit on your skin for 1-2 minutes or as long as told by your health care provider.  Thoroughly rinse your entire body in the shower. Make sure that all body creases and crevices are rinsed well.  Dry off with a clean towel. Do not put any substances on your body afterward--such as powder, lotion, or perfume--unless you are told to do so by your health care provider. Only use lotions that are recommended by the .  Put on clean clothes or pajamas.  If it is the night before your surgery, sleep in clean sheets.     During a sponge bath  Follow these steps when using CHG solution during a sponge bath (unless your health care provider gives you different instructions):  Use your normal  soap and shampoo to wash your face and hair.  Pour the CHG onto a clean washcloth.  Starting at your neck, lather your body down to your toes. Make sure you follow these instructions:  If you will be having surgery, pay special attention to the part of your body where you will be having surgery. Scrub this area for at least 1 minute.  Do not use CHG on your head or face. If the solution gets into your ears or eyes, rinse them well with water.  Avoid your genital area.  Avoid any areas of skin that have broken skin, cuts, or scrapes.  Scrub your back and under your arms. Make sure to wash skin folds.  Let the lather sit on your skin for 1-2 minutes or as long as told by your health care provider.  Using a different clean, wet washcloth, thoroughly rinse your entire body. Make sure that all body creases and crevices are rinsed well.  Dry off with a clean towel. Do not put any substances on your body afterward--such as powder, lotion, or perfume--unless you are told to do so by your health care provider. Only use lotions that are recommended by the .  Put on clean clothes or pajamas.  If it is the night before your surgery, sleep in clean sheets.  How to use CHG prepackaged cloths  Only use CHG cloths as told by your health care provider, and follow the instructions on the label.  Use the CHG cloth on clean, dry skin.  Do not use the CHG cloth on your head or face unless your health care provider tells you to.  When washing with the CHG cloth:  Avoid your genital area.  Avoid any areas of skin that have broken skin, cuts, or scrapes.  Before surgery    Follow these steps when using a CHG cloth to clean before surgery (unless your health care provider gives you different instructions):  Using the CHG cloth, vigorously scrub the part of your body where you will be having surgery. Scrub using a back-and-forth motion for 3 minutes. The area on your body should be completely wet with CHG when you are done  scrubbing.  Do not rinse. Discard the cloth and let the area air-dry. Do not put any substances on the area afterward, such as powder, lotion, or perfume.  Put on clean clothes or pajamas.  If it is the night before your surgery, sleep in clean sheets.     For general bathing  Follow these steps when using CHG cloths for general bathing (unless your health care provider gives you different instructions).  Use a separate CHG cloth for each area of your body. Make sure you wash between any folds of skin and between your fingers and toes. Wash your body in the following order, switching to a new cloth after each step:  The front of your neck, shoulders, and chest.  Both of your arms, under your arms, and your hands.  Your stomach and groin area, avoiding the genitals.  Your right leg and foot.  Your left leg and foot.  The back of your neck, your back, and your buttocks.  Do not rinse. Discard the cloth and let the area air-dry. Do not put any substances on your body afterward--such as powder, lotion, or perfume--unless you are told to do so by your health care provider. Only use lotions that are recommended by the .  Put on clean clothes or pajamas.  Contact a health care provider if:  Your skin gets irritated after scrubbing.  You have questions about using your solution or cloth.  You swallow any chlorhexidine. Call your local poison control center (1-665.411.8564 in the [place]  Get help right away if:  Your eyes itch badly, or they become very red or swollen.  Your skin itches badly and is red or swollen.  Your hearing changes.  You have trouble seeing.  You have swelling or tingling in your mouth or throat.  You have trouble breathing.  These symptoms may represent a serious problem that is an emergency. Do not wait to see if the symptoms will go away. Get medical help right away. Call your local emergency services (092 in the [place] Do not drive yourself to the hospital.  Summary  Chlorhexidine  gluconate (CHG) is a germ-killing (antiseptic) solution that is used to clean the skin. Cleaning your skin with CHG may help to lower your risk for infection.  You may be given CHG to use for bathing. It may be in a bottle or in a prepackaged cloth to use on your skin. Carefully follow your health care provider's instructions and the instructions on the product label.  Do not use CHG if you have a chlorhexidine allergy.  Contact your health care provider if your skin gets irritated after scrubbing.  This information is not intended to replace advice given to you by your health care provider. Make sure you discuss any questions you have with your health care provider.  Document Revised: 04/17/2023 Document Reviewed: 02/28/2022  [employer] Patient Education © 2023 [employer]

## 2023-11-15 LAB
QT INTERVAL: 388 MS
QTC INTERVAL: 421 MS

## 2023-11-16 ENCOUNTER — TELEPHONE (OUTPATIENT)
Dept: VASCULAR SURGERY | Facility: CLINIC | Age: 58
End: 2023-11-16
Payer: COMMERCIAL

## 2023-11-16 NOTE — TELEPHONE ENCOUNTER
Pt expressed understanding of arrival time of 5 am for procedure scheduled with Dr. Adorno on 11/17/23.  Pt advised NPO after midnight. Pt verified has held eliquis for 2 days prior to procedure.

## 2023-11-17 ENCOUNTER — HOSPITAL ENCOUNTER (OUTPATIENT)
Facility: HOSPITAL | Age: 58
Setting detail: HOSPITAL OUTPATIENT SURGERY
Discharge: HOME | End: 2023-11-17
Attending: SURGERY | Admitting: SURGERY
Payer: COMMERCIAL

## 2023-11-17 ENCOUNTER — ANESTHESIA EVENT (OUTPATIENT)
Dept: PERIOP | Facility: HOSPITAL | Age: 58
End: 2023-11-17
Payer: COMMERCIAL

## 2023-11-17 ENCOUNTER — ANESTHESIA (OUTPATIENT)
Dept: PERIOP | Facility: HOSPITAL | Age: 58
End: 2023-11-17
Payer: COMMERCIAL

## 2023-11-17 ENCOUNTER — APPOINTMENT (OUTPATIENT)
Dept: INTERVENTIONAL RADIOLOGY/VASCULAR | Facility: HOSPITAL | Age: 58
End: 2023-11-17
Payer: COMMERCIAL

## 2023-11-17 VITALS
RESPIRATION RATE: 16 BRPM | SYSTOLIC BLOOD PRESSURE: 112 MMHG | OXYGEN SATURATION: 98 % | DIASTOLIC BLOOD PRESSURE: 62 MMHG | HEART RATE: 76 BPM | TEMPERATURE: 97.2 F

## 2023-11-17 DIAGNOSIS — Z95.828 PRESENCE OF IVC FILTER: ICD-10-CM

## 2023-11-17 PROCEDURE — 25010000002 FENTANYL CITRATE (PF) 100 MCG/2ML SOLUTION: Performed by: NURSE ANESTHETIST, CERTIFIED REGISTERED

## 2023-11-17 PROCEDURE — 37248 TRLUML BALO ANGIOP 1ST VEIN: CPT | Performed by: SURGERY

## 2023-11-17 PROCEDURE — 25810000003 LACTATED RINGERS PER 1000 ML: Performed by: SURGERY

## 2023-11-17 PROCEDURE — 25010000002 BUPIVACAINE (PF) 0.5 % SOLUTION: Performed by: SURGERY

## 2023-11-17 PROCEDURE — 37193 REM ENDOVAS VENA CAVA FILTER: CPT | Performed by: SURGERY

## 2023-11-17 PROCEDURE — 25010000002 CEFAZOLIN 1-4 GM/50ML-% SOLUTION: Performed by: NURSE PRACTITIONER

## 2023-11-17 PROCEDURE — C1887 CATHETER, GUIDING: HCPCS | Performed by: SURGERY

## 2023-11-17 PROCEDURE — 75827 VEIN X-RAY CHEST: CPT

## 2023-11-17 PROCEDURE — 76000 FLUOROSCOPY <1 HR PHYS/QHP: CPT

## 2023-11-17 PROCEDURE — C1894 INTRO/SHEATH, NON-LASER: HCPCS | Performed by: SURGERY

## 2023-11-17 PROCEDURE — C1769 GUIDE WIRE: HCPCS | Performed by: SURGERY

## 2023-11-17 PROCEDURE — 25010000002 PROPOFOL 1000 MG/100ML EMULSION: Performed by: NURSE ANESTHETIST, CERTIFIED REGISTERED

## 2023-11-17 PROCEDURE — C1725 CATH, TRANSLUMIN NON-LASER: HCPCS | Performed by: SURGERY

## 2023-11-17 PROCEDURE — C1773 RET DEV, INSERTABLE: HCPCS | Performed by: SURGERY

## 2023-11-17 PROCEDURE — 25510000001 IOPAMIDOL 61 % SOLUTION: Performed by: SURGERY

## 2023-11-17 PROCEDURE — 25010000002 HEPARIN (PORCINE) PER 1000 UNITS: Performed by: SURGERY

## 2023-11-17 RX ORDER — BUPIVACAINE HYDROCHLORIDE 5 MG/ML
INJECTION, SOLUTION EPIDURAL; INTRACAUDAL AS NEEDED
Status: DISCONTINUED | OUTPATIENT
Start: 2023-11-17 | End: 2023-11-17 | Stop reason: HOSPADM

## 2023-11-17 RX ORDER — HYDROCODONE BITARTRATE AND ACETAMINOPHEN 5; 325 MG/1; MG/1
1 TABLET ORAL ONCE AS NEEDED
Status: DISCONTINUED | OUTPATIENT
Start: 2023-11-17 | End: 2023-11-17 | Stop reason: HOSPADM

## 2023-11-17 RX ORDER — HYDROCODONE BITARTRATE AND ACETAMINOPHEN 10; 325 MG/1; MG/1
1 TABLET ORAL EVERY 4 HOURS PRN
Status: DISCONTINUED | OUTPATIENT
Start: 2023-11-17 | End: 2023-11-17 | Stop reason: HOSPADM

## 2023-11-17 RX ORDER — IBUPROFEN 600 MG/1
600 TABLET ORAL ONCE AS NEEDED
Status: DISCONTINUED | OUTPATIENT
Start: 2023-11-17 | End: 2023-11-17 | Stop reason: HOSPADM

## 2023-11-17 RX ORDER — PROPOFOL 10 MG/ML
INJECTION, EMULSION INTRAVENOUS CONTINUOUS PRN
Status: DISCONTINUED | OUTPATIENT
Start: 2023-11-17 | End: 2023-11-17 | Stop reason: SURG

## 2023-11-17 RX ORDER — SODIUM CHLORIDE 0.9 % (FLUSH) 0.9 %
3 SYRINGE (ML) INJECTION EVERY 12 HOURS SCHEDULED
Status: DISCONTINUED | OUTPATIENT
Start: 2023-11-17 | End: 2023-11-17 | Stop reason: HOSPADM

## 2023-11-17 RX ORDER — BUPIVACAINE HCL/0.9 % NACL/PF 0.125 %
PLASTIC BAG, INJECTION (ML) EPIDURAL AS NEEDED
Status: DISCONTINUED | OUTPATIENT
Start: 2023-11-17 | End: 2023-11-17 | Stop reason: SURG

## 2023-11-17 RX ORDER — SODIUM CHLORIDE 9 MG/ML
40 INJECTION, SOLUTION INTRAVENOUS AS NEEDED
Status: DISCONTINUED | OUTPATIENT
Start: 2023-11-17 | End: 2023-11-17 | Stop reason: HOSPADM

## 2023-11-17 RX ORDER — CEFAZOLIN SODIUM 1 G/50ML
1000 INJECTION, SOLUTION INTRAVENOUS ONCE
Status: COMPLETED | OUTPATIENT
Start: 2023-11-17 | End: 2023-11-17

## 2023-11-17 RX ORDER — ONDANSETRON 2 MG/ML
4 INJECTION INTRAMUSCULAR; INTRAVENOUS ONCE AS NEEDED
Status: DISCONTINUED | OUTPATIENT
Start: 2023-11-17 | End: 2023-11-17 | Stop reason: HOSPADM

## 2023-11-17 RX ORDER — MIDAZOLAM HYDROCHLORIDE 1 MG/ML
1 INJECTION INTRAMUSCULAR; INTRAVENOUS
Status: DISCONTINUED | OUTPATIENT
Start: 2023-11-17 | End: 2023-11-17 | Stop reason: HOSPADM

## 2023-11-17 RX ORDER — DROPERIDOL 2.5 MG/ML
0.62 INJECTION, SOLUTION INTRAMUSCULAR; INTRAVENOUS ONCE AS NEEDED
Status: DISCONTINUED | OUTPATIENT
Start: 2023-11-17 | End: 2023-11-17 | Stop reason: HOSPADM

## 2023-11-17 RX ORDER — SODIUM CHLORIDE 0.9 % (FLUSH) 0.9 %
3 SYRINGE (ML) INJECTION AS NEEDED
Status: DISCONTINUED | OUTPATIENT
Start: 2023-11-17 | End: 2023-11-17 | Stop reason: HOSPADM

## 2023-11-17 RX ORDER — FENTANYL CITRATE 50 UG/ML
INJECTION, SOLUTION INTRAMUSCULAR; INTRAVENOUS AS NEEDED
Status: DISCONTINUED | OUTPATIENT
Start: 2023-11-17 | End: 2023-11-17 | Stop reason: SURG

## 2023-11-17 RX ORDER — EPHEDRINE SULFATE 50 MG/ML
INJECTION, SOLUTION INTRAVENOUS AS NEEDED
Status: DISCONTINUED | OUTPATIENT
Start: 2023-11-17 | End: 2023-11-17 | Stop reason: SURG

## 2023-11-17 RX ORDER — NALOXONE HCL 0.4 MG/ML
0.4 VIAL (ML) INJECTION AS NEEDED
Status: DISCONTINUED | OUTPATIENT
Start: 2023-11-17 | End: 2023-11-17 | Stop reason: HOSPADM

## 2023-11-17 RX ORDER — FENTANYL CITRATE 50 UG/ML
25 INJECTION, SOLUTION INTRAMUSCULAR; INTRAVENOUS
Status: DISCONTINUED | OUTPATIENT
Start: 2023-11-17 | End: 2023-11-17 | Stop reason: HOSPADM

## 2023-11-17 RX ORDER — LIDOCAINE HYDROCHLORIDE 10 MG/ML
0.5 INJECTION, SOLUTION EPIDURAL; INFILTRATION; INTRACAUDAL; PERINEURAL ONCE AS NEEDED
Status: DISCONTINUED | OUTPATIENT
Start: 2023-11-17 | End: 2023-11-17 | Stop reason: HOSPADM

## 2023-11-17 RX ORDER — SODIUM CHLORIDE, SODIUM LACTATE, POTASSIUM CHLORIDE, CALCIUM CHLORIDE 600; 310; 30; 20 MG/100ML; MG/100ML; MG/100ML; MG/100ML
100 INJECTION, SOLUTION INTRAVENOUS CONTINUOUS
Status: DISCONTINUED | OUTPATIENT
Start: 2023-11-17 | End: 2023-11-17 | Stop reason: HOSPADM

## 2023-11-17 RX ORDER — SODIUM CHLORIDE, SODIUM LACTATE, POTASSIUM CHLORIDE, CALCIUM CHLORIDE 600; 310; 30; 20 MG/100ML; MG/100ML; MG/100ML; MG/100ML
1000 INJECTION, SOLUTION INTRAVENOUS CONTINUOUS
Status: DISCONTINUED | OUTPATIENT
Start: 2023-11-17 | End: 2023-11-17 | Stop reason: HOSPADM

## 2023-11-17 RX ORDER — LABETALOL HYDROCHLORIDE 5 MG/ML
5 INJECTION, SOLUTION INTRAVENOUS
Status: DISCONTINUED | OUTPATIENT
Start: 2023-11-17 | End: 2023-11-17 | Stop reason: HOSPADM

## 2023-11-17 RX ORDER — FLUMAZENIL 0.1 MG/ML
0.2 INJECTION INTRAVENOUS AS NEEDED
Status: DISCONTINUED | OUTPATIENT
Start: 2023-11-17 | End: 2023-11-17 | Stop reason: HOSPADM

## 2023-11-17 RX ORDER — LIDOCAINE HYDROCHLORIDE 20 MG/ML
INJECTION, SOLUTION EPIDURAL; INFILTRATION; INTRACAUDAL; PERINEURAL AS NEEDED
Status: DISCONTINUED | OUTPATIENT
Start: 2023-11-17 | End: 2023-11-17 | Stop reason: SURG

## 2023-11-17 RX ORDER — ACETAMINOPHEN 500 MG
1000 TABLET ORAL ONCE
Status: COMPLETED | OUTPATIENT
Start: 2023-11-17 | End: 2023-11-17

## 2023-11-17 RX ORDER — SODIUM CHLORIDE 0.9 % (FLUSH) 0.9 %
3-10 SYRINGE (ML) INJECTION AS NEEDED
Status: DISCONTINUED | OUTPATIENT
Start: 2023-11-17 | End: 2023-11-17 | Stop reason: HOSPADM

## 2023-11-17 RX ADMIN — Medication 100 MCG: at 08:08

## 2023-11-17 RX ADMIN — SODIUM CHLORIDE, POTASSIUM CHLORIDE, SODIUM LACTATE AND CALCIUM CHLORIDE 1000 ML: 600; 310; 30; 20 INJECTION, SOLUTION INTRAVENOUS at 06:24

## 2023-11-17 RX ADMIN — EPHEDRINE SULFATE 15 MG: 50 INJECTION INTRAVENOUS at 09:21

## 2023-11-17 RX ADMIN — ACETAMINOPHEN TAB 500 MG 1000 MG: 500 TAB at 07:21

## 2023-11-17 RX ADMIN — PROPOFOL 125 MCG/KG/MIN: 10 INJECTION, EMULSION INTRAVENOUS at 07:59

## 2023-11-17 RX ADMIN — Medication 100 MCG: at 08:21

## 2023-11-17 RX ADMIN — FENTANYL CITRATE 100 MCG: 50 INJECTION, SOLUTION INTRAMUSCULAR; INTRAVENOUS at 07:59

## 2023-11-17 RX ADMIN — LIDOCAINE HYDROCHLORIDE 100 MG: 20 INJECTION, SOLUTION EPIDURAL; INFILTRATION; INTRACAUDAL; PERINEURAL at 07:59

## 2023-11-17 RX ADMIN — EPHEDRINE SULFATE 15 MG: 50 INJECTION INTRAVENOUS at 08:53

## 2023-11-17 RX ADMIN — CEFAZOLIN SODIUM 1000 MG: 1 INJECTION, SOLUTION INTRAVENOUS at 08:02

## 2023-11-17 RX ADMIN — Medication 100 MCG: at 08:17

## 2023-11-17 NOTE — ANESTHESIA PREPROCEDURE EVALUATION
Anesthesia Evaluation     Patient summary reviewed   no history of anesthetic complications:   NPO Solid Status: > 8 hours  NPO Liquid Status: > 6 hours           Airway   Mallampati: I  TM distance: >3 FB  Neck ROM: full  No difficulty expected  Dental - normal exam     Pulmonary - negative pulmonary ROS and normal exam   Cardiovascular - normal exam  Exercise tolerance: excellent (>7 METS)    (+) DVT resolved      Neuro/Psych- negative ROS  GI/Hepatic/Renal/Endo - negative ROS   (-) no thyroid disorder    Musculoskeletal (-) negative ROS    Abdominal  - normal exam   Substance History - negative use     OB/GYN          Other      history of cancer active                      Anesthesia Plan    ASA 2     MAC     (chronic partial deep venous thrombosis)  intravenous induction     Anesthetic plan, risks, benefits, and alternatives have been provided, discussed and informed consent has been obtained with: patient.        CODE STATUS:

## 2023-11-17 NOTE — ANESTHESIA POSTPROCEDURE EVALUATION
Patient: Tiff Elizalde    Procedure Summary       Date: 11/17/23 Room / Location: [facility] OR  / [facility] HYBRID OR 12    Anesthesia Start: 0756 Anesthesia Stop: 0940    Procedure: ATTEMPTED VENA CAVA FILTER REMOVAL (Groin) Diagnosis:       Presence of IVC filter      (Presence of IVC filter [Z95.828])    Surgeons: All Adorno DO Provider: Deysi Amaya CRNA    Anesthesia Type: MAC ASA Status: 2            Anesthesia Type: MAC    Vitals  Vitals Value Taken Time   /68 11/17/23 1013   Temp 97.2 °F (36.2 °C) 11/17/23 0956   Pulse 74 11/17/23 1013   Resp 18 11/17/23 1013   SpO2 100 % 11/17/23 1013           Post Anesthesia Care and Evaluation    Patient location during evaluation: PACU  Patient participation: complete - patient participated  Level of consciousness: awake and alert  Pain management: adequate    Airway patency: patent  Anesthetic complications: No anesthetic complications    Cardiovascular status: acceptable  Respiratory status: acceptable  Hydration status: acceptable    Comments: Blood pressure 105/68, pulse 74, temperature 97.2 °F (36.2 °C), temperature source Temporal, resp. rate 18, SpO2 100%, not currently breastfeeding.    Pt discharged from PACU based on amanda score >8  No anesthesia care post op

## 2023-11-17 NOTE — OP NOTE
Tiff Elizalde  11/17/2023     PREOPERATIVE DIAGNOSIS: Presence of IVC filter [Z95.828]     POSTOPERATIVE DIAGNOSIS: Post-Op Diagnosis Codes:     * Presence of IVC filter [Z95.828]     PROCEDURE PERFORMED:   1.  Ultrasound-guided cannulation of the right internal jugular vein  2.  Introduction of catheter/sheath into the inferior vena cava  3.  Right common femoral vein cannulation  4.  Inferior venacavogram with radiographic supervision and interpretation  5.  Balloon venoplasty of the inferior vena cava and IVC filter  6.  Attempted IVC filter retrieval  7.  Completion venogram with radiographic supervision and interpretation     SURGEON: All Adorno DO      ANESTHESIA: MAC    PREPARATION: Routine.    STAFF: Circulator: Dee Neff RN  Scrub Person: Joce Jean  Assistant: Nallely Trevizo  Vascular Radiology Technician: Ceci Reyes    Estimated Blood Loss: minimal    SPECIMENS: None    COMPLICATIONS: None    INDICATIONS: Tiff Elizalde is a 58 y.o. female  has a history of ovarian cancer, DVT, and PE.  She was diagnosed in November 2022.  She underwent treatment and placement of IVC filter.  She is still on Eliquis currently per Dr. Magana.  She did have testing recently which I did review.  The indications, risks, and possible complications of the procedure were explained to the patient, who voiced understanding and wished to proceed with surgery.     PROCEDURE IN DETAIL: The patient was taken to the operating room and placed on the operating table in a supine position. After MAC anesthesia was obtained, the right neck and right groin were prepped and draped in a sterile manner.  5 cc of 0.5% Marcaine plain was used to infiltrate the right neck for local anesthesia.  Under ultrasound guidance, and using a micropuncture technique, the right internal jugular vein was cannulated and a microsheath was placed.  The advantage Glidewire was advanced down into the IVC under fluoroscopic guidance.  The Bard  sheath was placed just above the IVC filter.  An inferior venacavogram was performed which showed the filter to be tilted/embedded in the posterior wall.  There was no clot noted in the filter.  Using the Omni Flush catheter and Iniguez cross catheter, multiple attempts were made to place the wire behind the filter for balloon venoplasty to dislodge the scar tissue.  This was unsuccessful from above.  Therefore, the decision was made to proceed with right femoral vein cannulation.  A 7 Ecuadorean sheath was placed.  Multiple attempts again were made from below to dislodge the IVC filter but this was unsuccessful.  Approximately 1 hour spent trying to dislodge the filter from multiple angles to allow snaring.  Again this was unsuccessful.  At this point, I felt no further intervention was warranted.  The sheaths and wires were removed.  Direct pressure was held over the neck in the groin for 10 to 15 minutes to help ensure hemostasis.  Sterile dressings were applied. The patient tolerated the procedure well. Sponge and needle counts were correct. The patient was then awakened in the operating room and taken to the recovery room in good condition.    All Adorno,   Date: 11/17/2023 Time: 09:21 CST

## 2023-11-19 NOTE — PROGRESS NOTES
Clinic Progress Note    Patient:  Tiff Elizalde  YOB: 1965  Date of Service: 11/21/2023  MRN: [id number]   Acct:    Primary Care Physician: Tyrone Garza MD    Chief Complaint: stage III A1 ovarian cancer    Hematology History:  Ms. Elizalde is a 58 y.o. pleasant lady who is seen on follow-up for stage III A1 ovarian cancer, left. She is seen post 6 cycles of adjuvant paclitaxel and carboplatin.  She had hypersensitivity reaction to paclitaxel on C1D1.  She is on adjuvant letrozole since 6/2023 through present.    Oncology/Hematology History Overview Note   DIAGNOSTIC ABNORMALITIES:   Pain lower back and left leg swelling.  She presented to her PCP.   CT scan done at [facility]. Details not available.  Patient seen by Dr. Virgilio Hayward 11/23/2022. Patient found to have a large pelvic mass causing bilateral hydronephrosis with resultant nonfunctioning left kidney, elevated creatinine, left deep venous thrombosis and pulmonary embolus.  Pathology report 11/28/2022.  Fallopian tube and ovary, left salpingo-oophorectomy: Fallopian tube with no evidence of involvement by malignancy.  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  No surface involvement demonstrated.  Uterus, fallopian tube and ovary, hysterectomy with right salpingo-oophorectomy: Cervix and endocervix with no evidence of squamous or glandular dysplasia.  Chronic cervicitis present.  Weakly proliferative endometrium, negative for hyperplasia, carcinoma, and endometritis.  Benign endometrial polyp present.  Adenomyosis.  No leiomyomata.  Right fallopian tube with focal involvement by serous neoplasm with Samona by this most consistent with low-grade serous carcinoma.  Remnant left ovary with adhesions to uterine serosa and focal involvement by low-grade serous carcinoma.  Right ovary with surface involvement by low-grade serous carcinoma.  Omentum biopsy: Involved by low-grade serous  carcinoma, invasive).  Sidewall, left pelvic biopsy: Edema with hemorrhage and chronic inflammation, negative for endometriosis and malignancy.  Lymph node left pelvic biopsy: 1 lymph node with a 0.9 mm focus of metastatic serous carcinoma surrounding adipose tissue with hemorrhage and edema.           PREVIOUS INTERVENTIONS:  IVC filter was placed 11/22/2022 at [place].  She had undergone exploratory laparotomy, total abdominal hysterectomy, bilateral salpingo-oophorectomy, debulking, left pelvic lymph node dissection and omentectomy by Dr. Hayward on 11/23/2022.  Estimated blood loss 400 ml.  Laparotomy showed a massive mass arising from the left ovary, retroperitoneal, infiltrating the retroperitoneal space and adherent to the sigmoid mesentery.  Within the retroperitoneum it was densely adherent to the iliac vessel on the left side and ureter.  Deliberate rupture of the mass was required in order to access the retroperitoneal attachment.  The mass was sent for frozen section and determined to be at least borderline tumor possibly low-grade serous tumor.  There was no other evidence of disease in the pelvis or upper abdomen.  Due to being densely adherent to the retroperitoneum and iliac vessels, it is unclear whether the entire tumor was removed or not and the pelvic sidewall biopsy was taken to determine whether the residual ovary was present and not a fibrotic reaction.  Bilateral ureterolysis was required.   Hypersensitivity reaction to Taxol 01/09/2023.   Adjuvant paclitaxel and carboplatin 01/10/2023 through 4/28/2023, 6 cycles.      Ovarian cancer   12/15/2022 Initial Diagnosis    Ovarian cancer (HCC)     1/9/2023 -  Chemotherapy    OP OVARIAN PACLitaxel / CARBOplatin (Q21D)     1/9/2023 -  Chemotherapy    OP CENTRAL VENOUS ACCESS DEVICE ACCESS, CARE, AND MAINTENANCE (CVAD)     1/20/2023 Cancer Staged    Staging form: Ovary, Fallopian Tube, And Primary Peritoneal Carcinoma, AJCC 8th Edition  -  Pathologic stage from 1/20/2023: FIGO Stage III (pT3, pN1, cM0) - Signed by Walker Magana MD on 1/20/2023     2/15/2023 - 2/15/2023 Chemotherapy    OP MAGNESIUM        Interval History:  Ms. Elizalde is here for her scheduled follow-up accompanied by her .  In the interim from last visit, it is of note that she went to the OR for attempt IVC filter removal on 11/13/2023 that was unsuccessful.  She feels very well today and has no complaints.  She denies having any fever, chills, chest pain, shortness of breath, abdominal pain, nausea or vomiting, change in bowel habits, weakness or numbness in arms or legs.    Objectives:  Vitals:    11/21/23 0808   BP: 128/70   Pulse: 78   Resp: 18   Temp: 97.4 °F (36.3 °C)   SpO2: 94%     GENERAL: Alert and oriented, no apparent distress  HEENT: No scleral icterus  NECK: Supple  HEART: Regular rate and rhythm  LUNGS: Clear to auscultation bilaterally  ABDOMEN: Soft, nontender, nondistended  EXTREMITIES: Symmetric  SKIN: No jaundice  PSYCHIATRIC: Full affect  NEUROLOGIC: Stable gait    Results:  Lab Results   Component Value Date    WBC 7.65 11/14/2023    HGB 12.0 11/14/2023    HCT 36.4 11/14/2023    MCV 88.6 11/14/2023     11/14/2023     Lab Results   Component Value Date    GLUCOSE 89 11/14/2023    BUN 23 11/14/2023    CREATININE 1.36 (H) 11/14/2023    EGFR 44.7 (L) 11/14/2023    BCR 16.9 11/14/2023    K 3.9 11/14/2023    CO2 30.0 (H) 11/14/2023    CALCIUM 9.7 11/14/2023    ALBUMIN 4.3 11/14/2023    BILITOT 0.2 11/14/2023    AST 17 11/14/2023    ALT 14 11/14/2023     Assessment :  1. Ovarian cancer, low-grade serous carcinoma, left. Tumor size 18 cm. Negative genetic abnormality.   AJCC stage:IIIA1 (pT3, pN1, cM0, G1)  Treatment status: Postadjuvant paclitaxel and carboplatin 01/10/2023 through 4/28/2023, 6 cycles. Adjuvant letrozole 2.5 mg 6/2023 through present by Dr. Hayward.  2. Left deep venous thrombosis from malignancy.  On therapy with apixaban.  3. Pulmonary  embolism from malignancy.  Undergoing treatment with apixaban.  Post IVC filter 11/22/2022.  4. Anemia from iron deficiency and chronic kidney disease stage IIIa, GFR 59.3 ml/min on 2/3/2023. Oral iron 01/10/2023 through 6/21/2023.    5. Hypersensitivity reaction to paclitaxel on 01/09/2023. Responded to dexamethasone as premed.   6. Grade 1 neuropathy. Under observation. Gabapentin on hold.    8. RIGHT lower lobe 3 mm pulmonary nodule. Observation.  9. RIGHT thyroid with a 4 cm heterogeneous nodule. Per PCP.  10. Nodule located in the segment 4 a of the liver measures 7 mm on 5/12/2023, MRI abdomen on 6/12/2023 showed benign cyst.    Plan:   - The patient has no signs of cancer recurrence per exam or labs.  - Reviewed the patient's labs done recently on 11/14/2023 which showed  level to be normal; when the patient saw Dr. Magana last in May 2023,  was borderline elevated, he was planning to have her do restaging imaging with CT scan, therefore I will plan to have these images scheduled.  - Bone density done on 9/1/2023 was consistent with osteopenia; I discussed with the patient whether to proceed with bone strengthening agents versus to monitor, and she elected not to do it, this is to be further discussed when she sees Dr. Magana.    > Recommend supplemental calcium with vitamin D twice daily secondary to osteopenia.   - Plan to continue surveillance: She will be seen every 2 to 4 months for the first 2 years then every 3 to 6 months for the next 3 years.  Annually after 5 years.  Imaging as clinically indicated.  - RTC in 3 months for surveillance exam and labs.    Amina Parks MD  11/21/2023

## 2023-11-21 ENCOUNTER — OFFICE VISIT (OUTPATIENT)
Dept: ONCOLOGY | Facility: CLINIC | Age: 58
End: 2023-11-21
Payer: COMMERCIAL

## 2023-11-21 VITALS
WEIGHT: 193.4 LBS | DIASTOLIC BLOOD PRESSURE: 70 MMHG | HEART RATE: 78 BPM | HEIGHT: 67 IN | OXYGEN SATURATION: 94 % | BODY MASS INDEX: 30.35 KG/M2 | TEMPERATURE: 97.4 F | SYSTOLIC BLOOD PRESSURE: 128 MMHG | RESPIRATION RATE: 18 BRPM

## 2023-11-21 DIAGNOSIS — I82.4Z2 DVT, LOWER EXTREMITY, DISTAL, ACUTE, LEFT: ICD-10-CM

## 2023-11-21 DIAGNOSIS — C56.2 MALIGNANT NEOPLASM OF LEFT OVARY: Primary | ICD-10-CM

## 2023-11-21 PROCEDURE — 99214 OFFICE O/P EST MOD 30 MIN: CPT | Performed by: INTERNAL MEDICINE

## 2023-11-21 RX ORDER — LIDOCAINE AND PRILOCAINE 25; 25 MG/G; MG/G
CREAM TOPICAL
Qty: 30 G | Refills: 0 | Status: SHIPPED | OUTPATIENT
Start: 2023-11-21

## 2023-11-30 ENCOUNTER — TELEPHONE (OUTPATIENT)
Dept: VASCULAR SURGERY | Facility: CLINIC | Age: 58
End: 2023-11-30
Payer: COMMERCIAL

## 2023-12-01 ENCOUNTER — OFFICE VISIT (OUTPATIENT)
Dept: VASCULAR SURGERY | Facility: CLINIC | Age: 58
End: 2023-12-01
Payer: COMMERCIAL

## 2023-12-01 VITALS
HEART RATE: 64 BPM | DIASTOLIC BLOOD PRESSURE: 78 MMHG | HEIGHT: 57 IN | WEIGHT: 197 LBS | SYSTOLIC BLOOD PRESSURE: 138 MMHG | BODY MASS INDEX: 42.5 KG/M2 | OXYGEN SATURATION: 97 %

## 2023-12-01 DIAGNOSIS — Z95.828 PRESENCE OF IVC FILTER: Primary | ICD-10-CM

## 2023-12-01 NOTE — LETTER
"December 17, 2023       No Recipients    Patient: Tiff Elizalde   YOB: 1965   Date of Visit: 12/1/2023     Dear Tyrone Garza MD:       Thank you for referring Tiff Elizalde to me for evaluation. Below are the relevant portions of my assessment and plan of care.    If you have questions, please do not hesitate to call me. I look forward to following Tiff along with you.         Sincerely,        Jenn HAILEE Tran        CC:   No Recipients    Jenn Tran APRN  12/17/23 1254  Sign when Signing Visit  12/17/2023       Tyrone Garza MD   [address] [facility] [address]      Tiff Elizalde  1965    Chief Complaint   Patient presents with   • prescence of ivc filter     No complaints , no changes       Dear Tyrone Garza MD       HPI  I had the pleasure of seeing your patient Tiff Elizalde in the office today.   As you recall, Tiff Elizalde is a 58 y.o.  female who you are currently following for surgical needs.  She has a history of ovarian cancer, DVT, and PE.  She was diagnosed in November 2022.  She underwent treatment and placement of IVC filter.  She is still on Eliquis currently per Dr. Magana.  She did undergo attempted vena cava filter removal however Dr. Adorno was unable to dislodge the filter.      Review of Systems   Constitutional: Negative.    HENT: Negative.     Eyes: Negative.    Respiratory: Negative.     Cardiovascular: Negative.    Gastrointestinal: Negative.    Endocrine: Negative.    Genitourinary: Negative.    Musculoskeletal: Negative.    Skin: Negative.    Allergic/Immunologic: Negative.    Neurological: Negative.    Hematological: Negative.    Psychiatric/Behavioral: Negative.         /78   Pulse 64   Ht 144.8 cm (57\")   Wt 89.4 kg (197 lb)   SpO2 97%   BMI 42.63 kg/m²   Physical Exam  Vitals and nursing note reviewed.   Constitutional:       General: She is not in acute distress.     Appearance: Normal appearance. She is " well-developed. She is not diaphoretic.   HENT:      Head: Normocephalic and atraumatic.   Eyes:      General: No scleral icterus.     Pupils: Pupils are equal, round, and reactive to light.   Neck:      Thyroid: No thyromegaly.      Vascular: No carotid bruit or JVD.      Comments: Puncture site healed  Cardiovascular:      Rate and Rhythm: Normal rate and regular rhythm.      Pulses: Normal pulses.      Heart sounds: Normal heart sounds and S2 normal. No murmur heard.     No friction rub. No gallop.   Pulmonary:      Effort: Pulmonary effort is normal.      Breath sounds: Normal breath sounds.   Abdominal:      General: Bowel sounds are normal.      Palpations: Abdomen is soft.   Musculoskeletal:         General: Normal range of motion.      Cervical back: Normal range of motion and neck supple.   Skin:     General: Skin is warm and dry.   Neurological:      General: No focal deficit present.      Mental Status: She is alert and oriented to person, place, and time.      Cranial Nerves: No cranial nerve deficit.   Psychiatric:         Mood and Affect: Mood normal.         Behavior: Behavior normal.         Thought Content: Thought content normal.         Judgment: Judgment normal.         Patient Active Problem List   Diagnosis   • Ovarian cancer   • Presence of IVC filter         ICD-10-CM ICD-9-CM   1. Presence of IVC filter  Z95.828 V45.89             Plan: After thoroughly evaluating Tiffanjel Elizalde, I believe the best course of action is to remain conservative from vascular surgery standpoint.  Unfortunately Dr. Adorno was unable to dislodge her IVC filter.  This will be permanent.  She does continue on Eliquis.  As previously discussed we recommended her port be removed.  If she does need a new port in the future it could be replaced.  Removing the port would reduce the risk of central venous stenosis, potential of SVC syndrome in the future.   The patient can continue taking their current medication regimen as  previously planned.  This was all discussed in full with complete understanding.    Thank you for allowing me to participate in the care of your patient.  Please do not hesitate with any questions or concerns.  I will keep you aware of any further encounters with Tiff Elizalde.        Sincerely yours,         HAILEE Strauss

## 2023-12-01 NOTE — PROGRESS NOTES
"12/17/2023       Tyrone Garza MD   [address] [facility] [address]      Tiff Elizalde  1965    Chief Complaint   Patient presents with    prescence of ivc filter     No complaints , no changes       Dear Tyrone Garza MD       HPI  I had the pleasure of seeing your patient Tiff Elizalde in the office today.   As you recall, Tiff Elizalde is a 58 y.o.  female who you are currently following for surgical needs.  She has a history of ovarian cancer, DVT, and PE.  She was diagnosed in November 2022.  She underwent treatment and placement of IVC filter.  She is still on Eliquis currently per Dr. Magana.  She did undergo attempted vena cava filter removal however Dr. Adorno was unable to dislodge the filter.      Review of Systems   Constitutional: Negative.    HENT: Negative.     Eyes: Negative.    Respiratory: Negative.     Cardiovascular: Negative.    Gastrointestinal: Negative.    Endocrine: Negative.    Genitourinary: Negative.    Musculoskeletal: Negative.    Skin: Negative.    Allergic/Immunologic: Negative.    Neurological: Negative.    Hematological: Negative.    Psychiatric/Behavioral: Negative.         /78   Pulse 64   Ht 144.8 cm (57\")   Wt 89.4 kg (197 lb)   SpO2 97%   BMI 42.63 kg/m²   Physical Exam  Vitals and nursing note reviewed.   Constitutional:       General: She is not in acute distress.     Appearance: Normal appearance. She is well-developed. She is not diaphoretic.   HENT:      Head: Normocephalic and atraumatic.   Eyes:      General: No scleral icterus.     Pupils: Pupils are equal, round, and reactive to light.   Neck:      Thyroid: No thyromegaly.      Vascular: No carotid bruit or JVD.      Comments: Puncture site healed  Cardiovascular:      Rate and Rhythm: Normal rate and regular rhythm.      Pulses: Normal pulses.      Heart sounds: Normal heart sounds and S2 normal. No murmur heard.     No friction rub. No gallop.   Pulmonary:      Effort: Pulmonary " effort is normal.      Breath sounds: Normal breath sounds.   Abdominal:      General: Bowel sounds are normal.      Palpations: Abdomen is soft.   Musculoskeletal:         General: Normal range of motion.      Cervical back: Normal range of motion and neck supple.   Skin:     General: Skin is warm and dry.   Neurological:      General: No focal deficit present.      Mental Status: She is alert and oriented to person, place, and time.      Cranial Nerves: No cranial nerve deficit.   Psychiatric:         Mood and Affect: Mood normal.         Behavior: Behavior normal.         Thought Content: Thought content normal.         Judgment: Judgment normal.         Patient Active Problem List   Diagnosis    Ovarian cancer    Presence of IVC filter         ICD-10-CM ICD-9-CM   1. Presence of IVC filter  Z95.828 V45.89             Plan: After thoroughly evaluating Tiff Elizalde, I believe the best course of action is to remain conservative from vascular surgery standpoint.  Unfortunately Dr. Adorno was unable to dislodge her IVC filter.  This will be permanent.  She does continue on Eliquis.  As previously discussed we recommended her port be removed.  If she does need a new port in the future it could be replaced.  Removing the port would reduce the risk of central venous stenosis, potential of SVC syndrome in the future.   The patient can continue taking their current medication regimen as previously planned.  This was all discussed in full with complete understanding.    Thank you for allowing me to participate in the care of your patient.  Please do not hesitate with any questions or concerns.  I will keep you aware of any further encounters with Tiff Elizalde.        Sincerely yours,         HAILEE Strauss

## 2023-12-18 ENCOUNTER — TELEPHONE (OUTPATIENT)
Dept: ONCOLOGY | Facility: CLINIC | Age: 58
End: 2023-12-18
Payer: COMMERCIAL

## 2023-12-18 NOTE — TELEPHONE ENCOUNTER
Caller: IZZY HERNANDEZ/ DR LEE      Best call back number: [phone]    Who are you requesting to speak with (clinical staff, provider,  specific staff member): CLINICAL      What was the call regarding: OFFICE IS NEEDING PATIENTS  FAXED -[phone]

## 2023-12-27 ENCOUNTER — HOSPITAL ENCOUNTER (OUTPATIENT)
Dept: CT IMAGING | Facility: HOSPITAL | Age: 58
Discharge: HOME | End: 2023-12-27
Admitting: INTERNAL MEDICINE
Payer: COMMERCIAL

## 2023-12-27 DIAGNOSIS — C56.2 MALIGNANT NEOPLASM OF LEFT OVARY: ICD-10-CM

## 2023-12-27 LAB — CREAT BLDA-MCNC: 1.4 MG/DL (ref 0.6–1.3)

## 2023-12-27 PROCEDURE — 71260 CT THORAX DX C+: CPT

## 2023-12-27 PROCEDURE — 25510000001 IOPAMIDOL 61 % SOLUTION: Performed by: INTERNAL MEDICINE

## 2023-12-27 PROCEDURE — 82565 ASSAY OF CREATININE: CPT

## 2023-12-27 PROCEDURE — 74177 CT ABD & PELVIS W/CONTRAST: CPT

## 2023-12-27 RX ADMIN — IOPAMIDOL 100 ML: 612 INJECTION, SOLUTION INTRAVENOUS at 09:47

## 2023-12-28 NOTE — PROGRESS NOTES
It doesn't give any measurement on the December scan. The May scan gives a specific measurement.   December scan doesn't mention the prior scan which is why I asked. But just order the US...

## 2023-12-28 NOTE — PROGRESS NOTES
Can you ask radiology to compare it to the May 2023 CT.  If it hasn't been looked at in 15 years we probably should ultrasound it.

## 2023-12-29 ENCOUNTER — TELEPHONE (OUTPATIENT)
Dept: ONCOLOGY | Facility: CLINIC | Age: 58
End: 2023-12-29
Payer: COMMERCIAL

## 2023-12-29 DIAGNOSIS — R93.89 ABNORMAL CT SCAN: Primary | ICD-10-CM

## 2023-12-29 NOTE — TELEPHONE ENCOUNTER
----- Message from HAILEE Hines sent at 12/28/2023  2:22 PM CST -----  It doesn't give any measurement on the December scan. The May scan gives a specific measurement.   December scan doesn't mention the prior scan which is why I asked. But just order the US...

## 2024-01-15 NOTE — PROGRESS NOTES
[facility] ON[facility] HEMATOLOGY & ONCOLOGY  [address]  [facility] [address] [phone]    Patient Name: Tiff Elizalde  Encounter Date: 01/23/2024  YOB: 1965  Patient Number: [id number]      REASON FOR FOLLOW-UP: Tiff Elizalde is a pleasant 58` y.o.  female who is seen on follow-up for stage III A1 low-grade serous carcinoma, left. She is seen 9 months post 6 cycles of adjuvant paclitaxel and carboplatin.  She had hypersensitivity reaction to paclitaxel on C1D1.  Patient is taking adjuvant letrozole since 6/2023 through present. She is seen with spouse Dennis. History is obtained from patient.  History is considered reliable.            Oncology/Hematology History Overview Note   DIAGNOSTIC ABNORMALITIES:   Pain lower back and left leg swelling.  She presented to her PCP.   CT scan done at [facility]. Details not available.  Patient seen by Dr. Virgilio Hayward 11/23/2022. Patient found to have a large pelvic mass causing bilateral hydronephrosis with resultant nonfunctioning left kidney, elevated creatinine, left deep venous thrombosis and pulmonary embolus.  Pathology report 11/28/2022.  Fallopian tube and ovary, left salpingo-oophorectomy: Fallopian tube with no evidence of involvement by malignancy.  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  No surface involvement demonstrated.  Uterus, fallopian tube and ovary, hysterectomy with right salpingo-oophorectomy: Cervix and endocervix with no evidence of squamous or glandular dysplasia.  Chronic cervicitis present.  Weakly proliferative endometrium, negative for hyperplasia, carcinoma, and endometritis.  Benign endometrial polyp present.  Adenomyosis.  No leiomyomata.  Right fallopian tube with focal involvement by serous neoplasm with Samona by this most consistent with low-grade serous carcinoma.  Remnant left ovary with adhesions to  uterine serosa and focal involvement by low-grade serous carcinoma.  Right ovary with surface involvement by low-grade serous carcinoma.  Omentum biopsy: Involved by low-grade serous carcinoma, invasive).  Sidewall, left pelvic biopsy: Edema with hemorrhage and chronic inflammation, negative for endometriosis and malignancy.  Lymph node left pelvic biopsy: 1 lymph node with a 0.9 mm focus of metastatic serous carcinoma surrounding adipose tissue with hemorrhage and edema.           PREVIOUS INTERVENTIONS:  IVC filter was placed 11/22/2022 at [place].  She had undergone exploratory laparotomy, total abdominal hysterectomy, bilateral salpingo-oophorectomy, debulking, left pelvic lymph node dissection and omentectomy by Dr. Hayward on 11/23/2022.  Estimated blood loss 400 ml.  Laparotomy showed a massive mass arising from the left ovary, retroperitoneal, infiltrating the retroperitoneal space and adherent to the sigmoid mesentery.  Within the retroperitoneum it was densely adherent to the iliac vessel on the left side and ureter.  Deliberate rupture of the mass was required in order to access the retroperitoneal attachment.  The mass was sent for frozen section and determined to be at least borderline tumor possibly low-grade serous tumor.  There was no other evidence of disease in the pelvis or upper abdomen.  Due to being densely adherent to the retroperitoneum and iliac vessels, it is unclear whether the entire tumor was removed or not and the pelvic sidewall biopsy was taken to determine whether the residual ovary was present and not a fibrotic reaction.  Bilateral ureterolysis was required.   Hypersensitivity reaction to Taxol 01/09/2023.   Adjuvant paclitaxel and carboplatin 01/10/2023 through 4/28/2023, 6 cycles.   Adjuvant letrozole on 6/2023 through present.     Ovarian cancer   12/15/2022 Initial Diagnosis    Ovarian cancer (HCC)     1/9/2023 -  Chemotherapy    OP OVARIAN PACLitaxel / CARBOplatin (Q21D)      1/9/2023 -  Chemotherapy    OP CENTRAL VENOUS ACCESS DEVICE ACCESS, CARE, AND MAINTENANCE (CVAD)     1/20/2023 Cancer Staged    Staging form: Ovary, Fallopian Tube, And Primary Peritoneal Carcinoma, AJCC 8th Edition  - Pathologic stage from 1/20/2023: FIGO Stage III (pT3, pN1, cM0) - Signed by Walker Magana MD on 1/20/2023     2/15/2023 - 2/15/2023 Chemotherapy    OP MAGNESIUM          PAST MEDICAL HISTORY:  ALLERGIES:  No Known Allergies  CURRENT MEDICATIONS:  Outpatient Encounter Medications as of 1/23/2024   Medication Sig Dispense Refill    Calcium Carbonate-Vit D-Min (CALCIUM 1200 PO) Take 1 tablet by mouth Daily.      Eliquis 5 MG tablet tablet 1 tablet Every 12 (Twelve) Hours.      letrozole (FEMARA) 2.5 MG tablet Take 1 tablet by mouth Daily.      lidocaine-prilocaine (EMLA) 2.5-2.5 % cream APPLY TOPICALLY TO PORT SITE 30 MINUTES PRIOR TO BEING ACCESSED 30 g 0    lisinopril-hydrochlorothiazide (PRINZIDE,ZESTORETIC) 20-12.5 MG per tablet Take 1 tablet by mouth Daily.      vitamin D3 125 MCG (5000 UT) capsule capsule Take  by mouth Daily.       No facility-administered encounter medications on file as of 1/23/2024.     ADULT ILLNESSES:  Patient Active Problem List   Diagnosis Code    Ovarian cancer C56.9    Encounter for care related to vascular access port Z45.2     SURGERIES:  Past Surgical History:   Procedure Laterality Date    HYSTERECTOMY      TONSILLECTOMY      TUMOR REMOVAL Left 11/2022    VENA CAVA FILTER INSERTION      VENA CAVA FILTER REMOVAL N/A 11/17/2023    Procedure: ATTEMPTED VENA CAVA FILTER REMOVAL;  Surgeon: All Adorno DO;  Location: [facility] OR 12;  Service: Vascular;  Laterality: N/A;    VENOUS ACCESS DEVICE (PORT) INSERTION N/A 12/30/2022    Procedure: INSERTION VENOUS ACCESS DEVICE;  Surgeon: Holley Mcgill MD;  Location: [facility] OR;  Service: General;  Laterality: N/A;     HEALTH MAINTENANCE ITEMS:  Health Maintenance Due   Topic Date Due    MAMMOGRAM  Never done     "BMI FOLLOWUP  Never done    COLORECTAL CANCER SCREENING  Never done    TDAP/TD VACCINES (2 - Tdap) 02/24/2008    ZOSTER VACCINE (1 of 2) Never done    HEPATITIS C SCREENING  Never done    ANNUAL PHYSICAL  Never done    COVID-19 Vaccine (4 - 2023-24 season) 09/01/2023       <no information>  Last Completed Colonoscopy       This patient has no relevant Health Maintenance data.          Immunization History   Administered Date(s) Administered    COVID-19 (MODERNA) 1st,2nd,3rd Dose Monovalent 07/29/2021, 08/26/2021    COVID-19 (MODERNA) Monovalent Original Booster 01/27/2022     Last Completed Mammogram       This patient has no relevant Health Maintenance data.              FAMILY HISTORY:  Family History   Problem Relation Age of Onset    Hypertension Mother     Multiple sclerosis Father     Heart attack Maternal Grandfather     Diabetes Paternal Grandmother     Heart attack Paternal Grandfather      SOCIAL HISTORY:  Social History     Socioeconomic History    Marital status:    Tobacco Use    Smoking status: Never    Smokeless tobacco: Never   Vaping Use    Vaping Use: Never used   Substance and Sexual Activity    Alcohol use: Never    Drug use: Never    Sexual activity: Defer       REVIEW OF SYSTEMS:    Review of Systems   Constitutional:  Negative for fatigue, fever and unexpected weight change.        \"I feel great.\"   HENT:  Negative for congestion and trouble swallowing.    Eyes:  Negative for discharge and redness.   Respiratory:  Negative for shortness of breath and wheezing.    Cardiovascular:  Negative for chest pain and leg swelling.   Gastrointestinal:  Negative for blood in stool, nausea and vomiting.   Endocrine: Negative for polydipsia and polyphagia.   Genitourinary:  Negative for difficulty urinating and hematuria.   Musculoskeletal:  Negative for gait problem and myalgias.   Skin:  Positive for pallor.   Allergic/Immunologic: Negative for food allergies.   Neurological:  Negative for " "dizziness, speech difficulty and weakness.   Hematological:  Negative for adenopathy. Does not bruise/bleed easily.   Psychiatric/Behavioral:  Negative for agitation, confusion and hallucinations.        VITAL SIGNS: /64   Pulse 75   Temp 97.6 °F (36.4 °C)   Resp 18   Ht 144.8 cm (57\")   Wt 89.8 kg (198 lb)   SpO2 99%   Breastfeeding No   BMI 42.85 kg/m²  gained 5 pounds. \"The weather.\"  Pain Score    01/23/24 0759   PainSc: 0-No pain       PHYSICAL EXAMINATION:     Physical Exam  Vitals reviewed.   Constitutional:       General: She is not in acute distress.  HENT:      Head: Normocephalic and atraumatic.   Eyes:      General: No scleral icterus.  Cardiovascular:      Rate and Rhythm: Normal rate.   Pulmonary:      Effort: No respiratory distress.      Breath sounds: No wheezing.      Comments: Port, left. No erythema.  Abdominal:      General: Bowel sounds are normal.      Palpations: Abdomen is soft.      Tenderness: There is no abdominal tenderness.   Musculoskeletal:         General: No swelling.      Cervical back: Neck supple.   Skin:     Coloration: Skin is pale.   Neurological:      Mental Status: She is alert and oriented to person, place, and time.   Psychiatric:         Mood and Affect: Mood normal.         Behavior: Behavior normal.         Thought Content: Thought content normal.         Judgment: Judgment normal.         LABS    Lab Results - Last 18 Months   Lab Units 01/18/24  0933 11/14/23  1239 11/14/23  0857 09/27/23  0902 08/16/23  0802 06/21/23  0750   HEMOGLOBIN g/dL 11.6* 12.0 12.1 11.7* 12.0 11.4*   HEMATOCRIT % 34.8 36.4 37.0 36.4 37.0 34.9   MCV fL 87.0 88.6 88.5 89.4 91.1 101.2*   WBC 10*3/mm3 7.96 7.65 7.74 7.41 7.00 5.88   RDW % 12.3 12.2* 12.1* 12.6 12.5 12.7   MPV fL 9.3 9.6 9.3 9.4 9.4 9.5   PLATELETS 10*3/mm3 204 197 194 205 207 154   IMM GRAN % % 0.3 0.3 0.4 0.3 0.1 0.2   NEUTROS ABS 10*3/mm3 5.12 4.97 5.30 4.73 4.14 3.42   LYMPHS ABS 10*3/mm3 2.00 1.88 1.58 1.89 2.02 " "1.54   MONOS ABS 10*3/mm3 0.71 0.70 0.73 0.67 0.73 0.67   EOS ABS 10*3/mm3 0.06 0.04 0.05 0.06 0.07 0.20   BASOS ABS 10*3/mm3 0.05 0.04 0.05 0.04 0.03 0.04   IMMATURE GRANS (ABS) 10*3/mm3 0.02 0.02 0.03 0.02 0.01 0.01   NRBC /100 WBC 0.0 0.0 0.0 0.0 0.0 0.0       Lab Results - Last 18 Months   Lab Units 01/18/24  0933 12/27/23  0921 11/14/23  1239 11/14/23  0857 09/27/23  0902 08/16/23  0802 06/21/23  0750 05/12/23  1444 04/28/23  0754   GLUCOSE mg/dL 100*  --  89 97 69 101* 78  --  125*   SODIUM mmol/L 140  --  142 139 141 140 143  --  137   POTASSIUM mmol/L 3.9  --  3.9 4.4 3.8 4.1 4.2  --  4.6   CO2 mmol/L 26.0  --  30.0* 30.0* 27.0 28.0 28.0  --  22.0   CHLORIDE mmol/L 104  --  102 103 104 104 105  --  102   ANION GAP mmol/L 10.0  --  10.0 6.0 10.0 8.0 10.0  --  13.0   CREATININE mg/dL 1.25* 1.40* 1.36* 1.27* 1.39* 1.50* 1.29*   < > 1.26*   BUN mg/dL 23  --  23 22 20 24* 27*  --  27*   BUN / CREAT RATIO  18.4  --  16.9 17.3 14.4 16.0 20.9  --  21.4   CALCIUM mg/dL 9.7  --  9.7 9.8 10.0 10.4 10.0  --  10.1   ALK PHOS U/L 78  --   --  75 69 71 66  --  84   TOTAL PROTEIN g/dL 6.9  --   --  7.1 6.8 7.2 7.1  --  7.2   ALT (SGPT) U/L 18  --   --  14 10 17 14  --  15   AST (SGOT) U/L 19  --   --  17 16 16 16  --  15   BILIRUBIN mg/dL 0.3  --   --  0.2 0.3 0.2 0.2  --  0.3   ALBUMIN g/dL 4.1  --   --  4.3 4.1 4.4 4.2  --  4.6   GLOBULIN gm/dL 2.8  --   --  2.8 2.7 2.8 2.9  --  2.6    < > = values in this interval not displayed.       No results for input(s): \"MSPIKE\", \"KAPPALAMB\", \"IGLFLC\", \"URICACID\", \"FREEKAPPAL\", \"CEA\", \"LDH\", \"REFLABREPO\" in the last [id number] hours.    Lab Results - Last 18 Months   Lab Units 06/21/23  0750 01/09/23  0845   IRON mcg/dL 76 35*   TIBC mcg/dL 325 308   IRON SATURATION (TSAT) % 23 11*   FERRITIN ng/mL 262.30* 379.80*       Tiff Elizalde reports a pain score of 0.         ASSESSMENT:  1.  Ovarian cancer, low-grade serous carcinoma, left.  Tumor size 18 cm. Negative genetic abnormality.   AJCC " "stage:IIIA1 (pT3, pN1, cM0, G1)  Treatment status: Postadjuvant paclitaxel and carboplatin 01/10/2023 through 4/28/2023, 6 cycles. Adjuvant letrozole 2.5 mg 6/2023 through present by Dr. Hayward.  2.  Performance status of 0.  3.  Left deep venous thrombosis from malignancy.  She is taking apixaban.  4.  Pulmonary embolism from malignancy.  Undergoing treatment with apixaban.  Post IVC filter 11/22/2022.  Unable to remove IVC filter on multiple attempts on 11/17/2023.  5.  Anemia from iron deficiency and chronic kidney disease stage IIIa, GFR 59.3 ml/min on 2/3/2023. Oral iron 01/10/2023 through 6/21/2023.    6.  Hypersensitivity reaction to paclitaxel on 01/09/2023. Responded to dexamethasone as premed.   7.  Grade 1 neuropathy. Under observation. Gabapentin on hold.    8.  RIGHT lower lobe 3 mm pulmonary nodule.  Under observation.  9.  RIGHT thyroid with a 4 cm heterogeneous nodule.  Management per primary care.  10. Nodule located in the segment 4 a of the liver measures 7 mm on 5/12/2023, MRI abdomen on 6/12/2023 showed benign cyst.  11.  Osteopenia.  On calcium and D.            PLAN:  1.   Re: Venous Doppler report on 11/14/2023.There is evidence of chronic partial deep venous thrombosis of the left lower extremity. This is unchanged from previous exam.  Bone density 9/1/2023 showed osteopenia.  Continue calcium and D.  Thyroid sonogram at [facility] 12/2023.   2.   Re: Tolerance to letrozole. \"Hot flashes.\"  3.   Re: Heme status.  WBC 7.9, hemoglobin 11.6, hematocrit 34.8, MCV 87 and platelet 204.   4.   Re: CMP.  GFR 49.4 from 44.7 from 48.5 from 43.8 from 40 from 47.9 mL per minute   5.   Re:  at not done from 35 from 38.9 from 43 from 33.    6.   Re: CT chest report on 12/27/2023. No evidence of metastatic disease in the chest.  Stable 3 mm right lower lobe pulmonary nodule.  Large right-sided thyroid nodule for which further characterization " "with thyroid ultrasound is recommended if not previously performed.  7.   Re: CT abdomen pelvis report 12/27/2023. No evidence of metastatic disease in the abdomen or pelvis.   8.   Re: Note from Dr. Adorno on 11/17/2023.  Attempted IVC filter retrieval.Note from vascular surgery 12/17/2023. Continue Eliquis.   9.   Re: Stable for observation, ovarian cancer.   10.  Obtain thyroid sonogram report 12/2023.   11.  Flush port every 6 weeks.  12.  eRx for Zofran 8 mg p.o. every 8 hours as needed for nausea and vomiting #60, 2 refills if needed.  13.  eRx gabapentin 300 mg po three times daily, 90, 2 refills if needed.  Observe for lightheadedness, dizziness or hypotension.  14.  eRx letrozole 2.5 mg po daily, 90, 3 refills if needed.  Monitor for hot flashes or bone loss.   15.  Continue ongoing management per primary care physician and other specialists.  16.  Plan of care discussed with patient and spouse Dennis.  Understanding expressed.  Patient agreeable to proceed.  17. Questions were answered to her satisfaction. \"Yes.\"  18.  She will be seen every 2 to 4 months for the first 2 years then every 3 to 6 months for the next 3 years.  Annually after 5 years.  Imaging as clinically indicated.  19.  Return to office in 12 weeks with CBC with differential, CMP, , and pre office left leg venous doppler.   20.   today.            I have reviewed the assessment and plan and verified the accuracy of it. No changes to assessment and plan since the information was documented. Walker Magana MD 01/23/24       I spent 34 total minutes, face-to-face, caring for Tiff today. Greater than 50% of this time involved counseling and/or coordination of care as documented within this note.                 MD Tyrone Galindo MD    "

## 2024-01-18 ENCOUNTER — LAB (OUTPATIENT)
Dept: LAB | Facility: HOSPITAL | Age: 59
End: 2024-01-18
Payer: COMMERCIAL

## 2024-01-18 ENCOUNTER — INFUSION (OUTPATIENT)
Dept: ONCOLOGY | Facility: CLINIC | Age: 59
End: 2024-01-18
Payer: COMMERCIAL

## 2024-01-18 DIAGNOSIS — Z45.2 ENCOUNTER FOR CARE RELATED TO VASCULAR ACCESS PORT: Primary | ICD-10-CM

## 2024-01-18 DIAGNOSIS — C56.2 MALIGNANT NEOPLASM OF LEFT OVARY: Primary | ICD-10-CM

## 2024-01-18 LAB
ALBUMIN SERPL-MCNC: 4.1 G/DL (ref 3.5–5.2)
ALBUMIN/GLOB SERPL: 1.5 G/DL
ALP SERPL-CCNC: 78 U/L (ref 39–117)
ALT SERPL W P-5'-P-CCNC: 18 U/L (ref 1–33)
ANION GAP SERPL CALCULATED.3IONS-SCNC: 10 MMOL/L (ref 5–15)
AST SERPL-CCNC: 19 U/L (ref 1–32)
BASOPHILS # BLD AUTO: 0.05 10*3/MM3 (ref 0–0.2)
BASOPHILS NFR BLD AUTO: 0.6 % (ref 0–1.5)
BILIRUB SERPL-MCNC: 0.3 MG/DL (ref 0–1.2)
BUN SERPL-MCNC: 23 MG/DL (ref 8–23)
BUN/CREAT SERPL: 18.4 (ref 7–25)
CALCIUM SPEC-SCNC: 9.7 MG/DL (ref 8.6–10.5)
CHLORIDE SERPL-SCNC: 104 MMOL/L (ref 98–107)
CO2 SERPL-SCNC: 26 MMOL/L (ref 22–29)
CREAT SERPL-MCNC: 1.25 MG/DL (ref 0.57–1)
DEPRECATED RDW RBC AUTO: 39.5 FL (ref 37–54)
EGFRCR SERPLBLD CKD-EPI 2021: 49.4 ML/MIN/1.73
EOSINOPHIL # BLD AUTO: 0.06 10*3/MM3 (ref 0–0.4)
EOSINOPHIL NFR BLD AUTO: 0.8 % (ref 0.3–6.2)
ERYTHROCYTE [DISTWIDTH] IN BLOOD BY AUTOMATED COUNT: 12.3 % (ref 12.3–15.4)
GLOBULIN UR ELPH-MCNC: 2.8 GM/DL
GLUCOSE SERPL-MCNC: 100 MG/DL (ref 65–99)
HCT VFR BLD AUTO: 34.8 % (ref 34–46.6)
HGB BLD-MCNC: 11.6 G/DL (ref 12–15.9)
HOLD SPECIMEN: NORMAL
IMM GRANULOCYTES # BLD AUTO: 0.02 10*3/MM3 (ref 0–0.05)
IMM GRANULOCYTES NFR BLD AUTO: 0.3 % (ref 0–0.5)
LYMPHOCYTES # BLD AUTO: 2 10*3/MM3 (ref 0.7–3.1)
LYMPHOCYTES NFR BLD AUTO: 25.1 % (ref 19.6–45.3)
MAGNESIUM SERPL-MCNC: 1.7 MG/DL (ref 1.6–2.4)
MCH RBC QN AUTO: 29 PG (ref 26.6–33)
MCHC RBC AUTO-ENTMCNC: 33.3 G/DL (ref 31.5–35.7)
MCV RBC AUTO: 87 FL (ref 79–97)
MONOCYTES # BLD AUTO: 0.71 10*3/MM3 (ref 0.1–0.9)
MONOCYTES NFR BLD AUTO: 8.9 % (ref 5–12)
NEUTROPHILS NFR BLD AUTO: 5.12 10*3/MM3 (ref 1.7–7)
NEUTROPHILS NFR BLD AUTO: 64.3 % (ref 42.7–76)
NRBC BLD AUTO-RTO: 0 /100 WBC (ref 0–0.2)
PLATELET # BLD AUTO: 204 10*3/MM3 (ref 140–450)
PMV BLD AUTO: 9.3 FL (ref 6–12)
POTASSIUM SERPL-SCNC: 3.9 MMOL/L (ref 3.5–5.2)
PROT SERPL-MCNC: 6.9 G/DL (ref 6–8.5)
RBC # BLD AUTO: 4 10*6/MM3 (ref 3.77–5.28)
SODIUM SERPL-SCNC: 140 MMOL/L (ref 136–145)
WBC NRBC COR # BLD AUTO: 7.96 10*3/MM3 (ref 3.4–10.8)

## 2024-01-18 PROCEDURE — 83735 ASSAY OF MAGNESIUM: CPT

## 2024-01-18 PROCEDURE — 85025 COMPLETE CBC W/AUTO DIFF WBC: CPT

## 2024-01-18 PROCEDURE — 80053 COMPREHEN METABOLIC PANEL: CPT

## 2024-01-18 RX ORDER — HEPARIN SODIUM (PORCINE) LOCK FLUSH IV SOLN 100 UNIT/ML 100 UNIT/ML
500 SOLUTION INTRAVENOUS AS NEEDED
OUTPATIENT
Start: 2024-01-18

## 2024-01-18 RX ORDER — SODIUM CHLORIDE 0.9 % (FLUSH) 0.9 %
10 SYRINGE (ML) INJECTION AS NEEDED
Status: DISCONTINUED | OUTPATIENT
Start: 2024-01-18 | End: 2024-01-18 | Stop reason: HOSPADM

## 2024-01-18 RX ORDER — HEPARIN SODIUM (PORCINE) LOCK FLUSH IV SOLN 100 UNIT/ML 100 UNIT/ML
500 SOLUTION INTRAVENOUS AS NEEDED
Status: DISCONTINUED | OUTPATIENT
Start: 2024-01-18 | End: 2024-01-18 | Stop reason: HOSPADM

## 2024-01-18 RX ORDER — SODIUM CHLORIDE 0.9 % (FLUSH) 0.9 %
10 SYRINGE (ML) INJECTION AS NEEDED
OUTPATIENT
Start: 2024-01-18

## 2024-01-18 RX ADMIN — HEPARIN SODIUM (PORCINE) LOCK FLUSH IV SOLN 100 UNIT/ML 500 UNITS: 100 SOLUTION at 09:20

## 2024-01-18 RX ADMIN — Medication 10 ML: at 09:20

## 2024-01-23 ENCOUNTER — OFFICE VISIT (OUTPATIENT)
Dept: ONCOLOGY | Facility: CLINIC | Age: 59
End: 2024-01-23
Payer: COMMERCIAL

## 2024-01-23 ENCOUNTER — LAB (OUTPATIENT)
Dept: LAB | Facility: HOSPITAL | Age: 59
End: 2024-01-23
Payer: COMMERCIAL

## 2024-01-23 VITALS
WEIGHT: 198 LBS | RESPIRATION RATE: 18 BRPM | DIASTOLIC BLOOD PRESSURE: 64 MMHG | BODY MASS INDEX: 42.72 KG/M2 | SYSTOLIC BLOOD PRESSURE: 148 MMHG | OXYGEN SATURATION: 99 % | HEART RATE: 75 BPM | HEIGHT: 57 IN | TEMPERATURE: 97.6 F

## 2024-01-23 DIAGNOSIS — C56.1 MALIGNANT NEOPLASM OF RIGHT OVARY: ICD-10-CM

## 2024-01-23 DIAGNOSIS — C56.1 MALIGNANT NEOPLASM OF RIGHT OVARY: Primary | ICD-10-CM

## 2024-01-23 LAB — CANCER AG125 SERPL QL: 32.3 U/ML (ref 0–38.1)

## 2024-01-23 PROCEDURE — 99214 OFFICE O/P EST MOD 30 MIN: CPT | Performed by: INTERNAL MEDICINE

## 2024-01-23 PROCEDURE — 86304 IMMUNOASSAY TUMOR CA 125: CPT

## 2024-01-23 PROCEDURE — 36415 COLL VENOUS BLD VENIPUNCTURE: CPT

## 2024-01-23 NOTE — LETTER
January 23, 2024       No Recipients    Patient: Tiff Elizalde   YOB: 1965   Date of Visit: 1/23/2024     Dear Tyrone Garza MD:       Thank you for referring Tiff Elizalde to me for evaluation. Below are the relevant portions of my assessment and plan of care.    If you have questions, please do not hesitate to call me. I look forward to following Tiff along with you.         Sincerely,        Walker Magana MD        CC:   No Recipients    Walker Magana MD  01/23/24 0822  Sign when Signing Visit  [facility] H[facility]ATOLOGY & ONCOLOGY  [address]  [facility] [id number] [phone]    Patient Name: Tiff Elizalde  Encounter Date: 01/23/2024  YOB: 1965  Patient Number: [id number]      REASON FOR FOLLOW-UP: Tiff Elizalde is a pleasant 58` y.o.  female who is seen on follow-up for stage III A1 low-grade serous carcinoma, left. She is seen 9 months post 6 cycles of adjuvant paclitaxel and carboplatin.  She had hypersensitivity reaction to paclitaxel on C1D1.  Patient is taking adjuvant letrozole since 6/2023 through present. She is seen with spouse Dennis. History is obtained from patient.  History is considered reliable.            Oncology/Hematology History Overview Note   DIAGNOSTIC ABNORMALITIES:   Pain lower back and left leg swelling.  She presented to her PCP.   CT scan done at [facility]. Details not available.  Patient seen by Dr. Virgilio Hayward 11/23/2022. Patient found to have a large pelvic mass causing bilateral hydronephrosis with resultant nonfunctioning left kidney, elevated creatinine, left deep venous thrombosis and pulmonary embolus.  Pathology report 11/28/2022.  Fallopian tube and ovary, left salpingo-oophorectomy: Fallopian tube with no evidence of involvement by malignancy.  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  No surface involvement  demonstrated.  Uterus, fallopian tube and ovary, hysterectomy with right salpingo-oophorectomy: Cervix and endocervix with no evidence of squamous or glandular dysplasia.  Chronic cervicitis present.  Weakly proliferative endometrium, negative for hyperplasia, carcinoma, and endometritis.  Benign endometrial polyp present.  Adenomyosis.  No leiomyomata.  Right fallopian tube with focal involvement by serous neoplasm with Samona by this most consistent with low-grade serous carcinoma.  Remnant left ovary with adhesions to uterine serosa and focal involvement by low-grade serous carcinoma.  Right ovary with surface involvement by low-grade serous carcinoma.  Omentum biopsy: Involved by low-grade serous carcinoma, invasive).  Sidewall, left pelvic biopsy: Edema with hemorrhage and chronic inflammation, negative for endometriosis and malignancy.  Lymph node left pelvic biopsy: 1 lymph node with a 0.9 mm focus of metastatic serous carcinoma surrounding adipose tissue with hemorrhage and edema.           PREVIOUS INTERVENTIONS:  IVC filter was placed 11/22/2022 at [place].  She had undergone exploratory laparotomy, total abdominal hysterectomy, bilateral salpingo-oophorectomy, debulking, left pelvic lymph node dissection and omentectomy by Dr. Hayward on 11/23/2022.  Estimated blood loss 400 ml.  Laparotomy showed a massive mass arising from the left ovary, retroperitoneal, infiltrating the retroperitoneal space and adherent to the sigmoid mesentery.  Within the retroperitoneum it was densely adherent to the iliac vessel on the left side and ureter.  Deliberate rupture of the mass was required in order to access the retroperitoneal attachment.  The mass was sent for frozen section and determined to be at least borderline tumor possibly low-grade serous tumor.  There was no other evidence of disease in the pelvis or upper abdomen.  Due to being densely adherent to the retroperitoneum and iliac vessels, it is unclear  whether the entire tumor was removed or not and the pelvic sidewall biopsy was taken to determine whether the residual ovary was present and not a fibrotic reaction.  Bilateral ureterolysis was required.   Hypersensitivity reaction to Taxol 01/09/2023.   Adjuvant paclitaxel and carboplatin 01/10/2023 through 4/28/2023, 6 cycles.   Adjuvant letrozole on 6/2023 through present.     Ovarian cancer   12/15/2022 Initial Diagnosis    Ovarian cancer (HCC)     1/9/2023 -  Chemotherapy    OP OVARIAN PACLitaxel / CARBOplatin (Q21D)     1/9/2023 -  Chemotherapy    OP CENTRAL VENOUS ACCESS DEVICE ACCESS, CARE, AND MAINTENANCE (CVAD)     1/20/2023 Cancer Staged    Staging form: Ovary, Fallopian Tube, And Primary Peritoneal Carcinoma, AJCC 8th Edition  - Pathologic stage from 1/20/2023: FIGO Stage III (pT3, pN1, cM0) - Signed by Walker Magana MD on 1/20/2023     2/15/2023 - 2/15/2023 Chemotherapy    OP MAGNESIUM          PAST MEDICAL HISTORY:  ALLERGIES:  No Known Allergies  CURRENT MEDICATIONS:  Outpatient Encounter Medications as of 1/23/2024   Medication Sig Dispense Refill   • Calcium Carbonate-Vit D-Min (CALCIUM 1200 PO) Take 1 tablet by mouth Daily.     • Eliquis 5 MG tablet tablet 1 tablet Every 12 (Twelve) Hours.     • letrozole (FEMARA) 2.5 MG tablet Take 1 tablet by mouth Daily.     • lidocaine-prilocaine (EMLA) 2.5-2.5 % cream APPLY TOPICALLY TO PORT SITE 30 MINUTES PRIOR TO BEING ACCESSED 30 g 0   • lisinopril-hydrochlorothiazide (PRINZIDE,ZESTORETIC) 20-12.5 MG per tablet Take 1 tablet by mouth Daily.     • vitamin D3 125 MCG (5000 UT) capsule capsule Take  by mouth Daily.       No facility-administered encounter medications on file as of 1/23/2024.     ADULT ILLNESSES:  Patient Active Problem List   Diagnosis Code   • Ovarian cancer C56.9   • Encounter for care related to vascular access port Z45.2     SURGERIES:  Past Surgical History:   Procedure Laterality Date   • HYSTERECTOMY     • TONSILLECTOMY     • TUMOR  "REMOVAL Left 11/2022   • VENA CAVA FILTER INSERTION     • VENA CAVA FILTER REMOVAL N/A 11/17/2023    Procedure: ATTEMPTED VENA CAVA FILTER REMOVAL;  Surgeon: All Adorno DO;  Location:  [facility] HYBRID OR 12;  Service: Vascular;  Laterality: N/A;   • VENOUS ACCESS DEVICE (PORT) INSERTION N/A 12/30/2022    Procedure: INSERTION VENOUS ACCESS DEVICE;  Surgeon: Holley Mcgill MD;  Location:  [facility] OR;  Service: General;  Laterality: N/A;     HEALTH MAINTENANCE ITEMS:  Health Maintenance Due   Topic Date Due   • MAMMOGRAM  Never done   • BMI FOLLOWUP  Never done   • COLORECTAL CANCER SCREENING  Never done   • TDAP/TD VACCINES (2 - Tdap) 02/24/2008   • ZOSTER VACCINE (1 of 2) Never done   • HEPATITIS C SCREENING  Never done   • ANNUAL PHYSICAL  Never done   • COVID-19 Vaccine (4 - 2023-24 season) 09/01/2023       <no information>  Last Completed Colonoscopy       This patient has no relevant Health Maintenance data.          Immunization History   Administered Date(s) Administered   • COVID-19 (MODERNA) 1st,2nd,3rd Dose Monovalent 07/29/2021, 08/26/2021   • COVID-19 (MODERNA) Monovalent Original Booster 01/27/2022     Last Completed Mammogram       This patient has no relevant Health Maintenance data.              FAMILY HISTORY:  Family History   Problem Relation Age of Onset   • Hypertension Mother    • Multiple sclerosis Father    • Heart attack Maternal Grandfather    • Diabetes Paternal Grandmother    • Heart attack Paternal Grandfather      SOCIAL HISTORY:  Social History     Socioeconomic History   • Marital status:    Tobacco Use   • Smoking status: Never   • Smokeless tobacco: Never   Vaping Use   • Vaping Use: Never used   Substance and Sexual Activity   • Alcohol use: Never   • Drug use: Never   • Sexual activity: Defer       REVIEW OF SYSTEMS:    Review of Systems   Constitutional:  Negative for fatigue, fever and unexpected weight change.        \"I feel great.\"   HENT:  Negative for congestion " "and trouble swallowing.    Eyes:  Negative for discharge and redness.   Respiratory:  Negative for shortness of breath and wheezing.    Cardiovascular:  Negative for chest pain and leg swelling.   Gastrointestinal:  Negative for blood in stool, nausea and vomiting.   Endocrine: Negative for polydipsia and polyphagia.   Genitourinary:  Negative for difficulty urinating and hematuria.   Musculoskeletal:  Negative for gait problem and myalgias.   Skin:  Positive for pallor.   Allergic/Immunologic: Negative for food allergies.   Neurological:  Negative for dizziness, speech difficulty and weakness.   Hematological:  Negative for adenopathy. Does not bruise/bleed easily.   Psychiatric/Behavioral:  Negative for agitation, confusion and hallucinations.        VITAL SIGNS: /64   Pulse 75   Temp 97.6 °F (36.4 °C)   Resp 18   Ht 144.8 cm (57\")   Wt 89.8 kg (198 lb)   SpO2 99%   Breastfeeding No   BMI 42.85 kg/m²  gained 5 pounds. \"The weather.\"  Pain Score    01/23/24 0759   PainSc: 0-No pain       PHYSICAL EXAMINATION:     Physical Exam  Vitals reviewed.   Constitutional:       General: She is not in acute distress.  HENT:      Head: Normocephalic and atraumatic.   Eyes:      General: No scleral icterus.  Cardiovascular:      Rate and Rhythm: Normal rate.   Pulmonary:      Effort: No respiratory distress.      Breath sounds: No wheezing.      Comments: Port, left. No erythema.  Abdominal:      General: Bowel sounds are normal.      Palpations: Abdomen is soft.      Tenderness: There is no abdominal tenderness.   Musculoskeletal:         General: No swelling.      Cervical back: Neck supple.   Skin:     Coloration: Skin is pale.   Neurological:      Mental Status: She is alert and oriented to person, place, and time.   Psychiatric:         Mood and Affect: Mood normal.         Behavior: Behavior normal.         Thought Content: Thought content normal.         Judgment: Judgment normal.         LABS    Lab " Results - Last 18 Months   Lab Units 01/18/24  0933 11/14/23  1239 11/14/23  0857 09/27/23  0902 08/16/23  0802 06/21/23  0750   HEMOGLOBIN g/dL 11.6* 12.0 12.1 11.7* 12.0 11.4*   HEMATOCRIT % 34.8 36.4 37.0 36.4 37.0 34.9   MCV fL 87.0 88.6 88.5 89.4 91.1 101.2*   WBC 10*3/mm3 7.96 7.65 7.74 7.41 7.00 5.88   RDW % 12.3 12.2* 12.1* 12.6 12.5 12.7   MPV fL 9.3 9.6 9.3 9.4 9.4 9.5   PLATELETS 10*3/mm3 204 197 194 205 207 154   IMM GRAN % % 0.3 0.3 0.4 0.3 0.1 0.2   NEUTROS ABS 10*3/mm3 5.12 4.97 5.30 4.73 4.14 3.42   LYMPHS ABS 10*3/mm3 2.00 1.88 1.58 1.89 2.02 1.54   MONOS ABS 10*3/mm3 0.71 0.70 0.73 0.67 0.73 0.67   EOS ABS 10*3/mm3 0.06 0.04 0.05 0.06 0.07 0.20   BASOS ABS 10*3/mm3 0.05 0.04 0.05 0.04 0.03 0.04   IMMATURE GRANS (ABS) 10*3/mm3 0.02 0.02 0.03 0.02 0.01 0.01   NRBC /100 WBC 0.0 0.0 0.0 0.0 0.0 0.0       Lab Results - Last 18 Months   Lab Units 01/18/24  0933 12/27/23  0921 11/14/23  1239 11/14/23  0857 09/27/23  0902 08/16/23  0802 06/21/23  0750 05/12/23  1444 04/28/23  0754   GLUCOSE mg/dL 100*  --  89 97 69 101* 78  --  125*   SODIUM mmol/L 140  --  142 139 141 140 143  --  137   POTASSIUM mmol/L 3.9  --  3.9 4.4 3.8 4.1 4.2  --  4.6   CO2 mmol/L 26.0  --  30.0* 30.0* 27.0 28.0 28.0  --  22.0   CHLORIDE mmol/L 104  --  102 103 104 104 105  --  102   ANION GAP mmol/L 10.0  --  10.0 6.0 10.0 8.0 10.0  --  13.0   CREATININE mg/dL 1.25* 1.40* 1.36* 1.27* 1.39* 1.50* 1.29*   < > 1.26*   BUN mg/dL 23  --  23 22 20 24* 27*  --  27*   BUN / CREAT RATIO  18.4  --  16.9 17.3 14.4 16.0 20.9  --  21.4   CALCIUM mg/dL 9.7  --  9.7 9.8 10.0 10.4 10.0  --  10.1   ALK PHOS U/L 78  --   --  75 69 71 66  --  84   TOTAL PROTEIN g/dL 6.9  --   --  7.1 6.8 7.2 7.1  --  7.2   ALT (SGPT) U/L 18  --   --  14 10 17 14  --  15   AST (SGOT) U/L 19  --   --  17 16 16 16  --  15   BILIRUBIN mg/dL 0.3  --   --  0.2 0.3 0.2 0.2  --  0.3   ALBUMIN g/dL 4.1  --   --  4.3 4.1 4.4 4.2  --  4.6   GLOBULIN gm/dL 2.8  --   --  2.8 2.7  "2.8 2.9  --  2.6    < > = values in this interval not displayed.       No results for input(s): \"MSPIKE\", \"KAPPALAMB\", \"IGLFLC\", \"URICACID\", \"FREEKAPPAL\", \"CEA\", \"LDH\", \"REFLABREPO\" in the last 93701 hours.    Lab Results - Last 18 Months   Lab Units 06/21/23  0750 01/09/23  0845   IRON mcg/dL 76 35*   TIBC mcg/dL 325 308   IRON SATURATION (TSAT) % 23 11*   FERRITIN ng/mL 262.30* 379.80*       Tiff Elizalde reports a pain score of 0.         ASSESSMENT:  1.  Ovarian cancer, low-grade serous carcinoma, left.  Tumor size 18 cm. Negative genetic abnormality.   AJCC stage:IIIA1 (pT3, pN1, cM0, G1)  Treatment status: Postadjuvant paclitaxel and carboplatin 01/10/2023 through 4/28/2023, 6 cycles. Adjuvant letrozole 2.5 mg 6/2023 through present by Dr. Hayward.  2.  Performance status of 0.  3.  Left deep venous thrombosis from malignancy.  She is taking apixaban.  4.  Pulmonary embolism from malignancy.  Undergoing treatment with apixaban.  Post IVC filter 11/22/2022.  Unable to remove IVC filter on multiple attempts on 11/17/2023.  5.  Anemia from iron deficiency and chronic kidney disease stage IIIa, GFR 59.3 ml/min on 2/3/2023. Oral iron 01/10/2023 through 6/21/2023.    6.  Hypersensitivity reaction to paclitaxel on 01/09/2023. Responded to dexamethasone as premed.   7.  Grade 1 neuropathy. Under observation. Gabapentin on hold.    8.  RIGHT lower lobe 3 mm pulmonary nodule.  Under observation.  9.  RIGHT thyroid with a 4 cm heterogeneous nodule.  Management per primary care.  10. Nodule located in the segment 4 a of the liver measures 7 mm on 5/12/2023, MRI abdomen on 6/12/2023 showed benign cyst.  11.  Osteopenia.  On calcium and D.            PLAN:  1.   Re: Venous Doppler report on 11/14/2023.There is evidence of chronic partial deep venous thrombosis of the left lower extremity. This is unchanged from previous exam.  Bone density 9/1/2023 showed osteopenia.  Continue calcium and D.  Thyroid sonogram at " "[facility] 12/2023.   2.   Re: Tolerance to letrozole. \"Hot flashes.\"  3.   Re: Heme status.  WBC 7.9, hemoglobin 11.6, hematocrit 34.8, MCV 87 and platelet 204.   4.   Re: CMP.  GFR 49.4 from 44.7 from 48.5 from 43.8 from 40 from 47.9 mL per minute   5.   Re:  at not done from 35 from 38.9 from 43 from 33.    6.   Re: CT chest report on 12/27/2023. No evidence of metastatic disease in the chest.  Stable 3 mm right lower lobe pulmonary nodule.  Large right-sided thyroid nodule for which further characterization with thyroid ultrasound is recommended if not previously performed.  7.   Re: CT abdomen pelvis report 12/27/2023. No evidence of metastatic disease in the abdomen or pelvis.   8.   Re: Note from Dr. Adorno on 11/17/2023.  Attempted IVC filter retrieval.Note from vascular surgery 12/17/2023. Continue Eliquis.   9.   Re: Stable for observation, ovarian cancer.   10.  Obtain thyroid sonogram report 12/2023.   11.  Flush port every 6 weeks.  12.  eRx for Zofran 8 mg p.o. every 8 hours as needed for nausea and vomiting #60, 2 refills if needed.  13.  eRx gabapentin 300 mg po three times daily, 90, 2 refills if needed.  Observe for lightheadedness, dizziness or hypotension.  14.  eRx letrozole 2.5 mg po daily, 90, 3 refills if needed.  Monitor for hot flashes or bone loss.   15.  Continue ongoing management per primary care physician and other specialists.  16.  Plan of care discussed with patient and spouse Dennis.  Understanding expressed.  Patient agreeable to proceed.  17. Questions were answered to her satisfaction. \"Yes.\"  18.  She will be seen every 2 to 4 months for the first 2 years then every 3 to 6 months for the next 3 years.  Annually after 5 years.  Imaging as clinically indicated.  19.  Return to office in 12 weeks with CBC with differential, CMP, , and pre office left leg venous doppler.   20.   today.        "     I have reviewed the assessment and plan and verified the accuracy of it. No changes to assessment and plan since the information was documented. Walker Magana MD 01/23/24       I spent 34 total minutes, face-to-face, caring for Tiff today. Greater than 50% of this time involved counseling and/or coordination of care as documented within this note.                 MD Tyrone Galindo MD

## 2024-01-23 NOTE — LETTER
January 23, 2024       No Recipients    Patient: Tiff Elizalde   YOB: 1965   Date of Visit: 1/23/2024     Dear Tyrone Garza MD:       Thank you for referring Tiff Elizalde to me for evaluation. Below are the relevant portions of my assessment and plan of care.    If you have questions, please do not hesitate to call me. I look forward to following Tiff along with you.         Sincerely,        Walker Magana MD        CC:   No Recipients    Walker Magana MD  01/23/24 0822  Sign when Signing Visit  [facility] HEMATOLOGY & ONCOLOGY  [address]  [facility] [address] [phone]    Patient Name: Tiff Elizalde  Encounter Date: 01/23/2024  YOB: 1965  Patient Number: [id number]      REASON FOR FOLLOW-UP: Tiff Elizalde is a pleasant 58` y.o.  female who is seen on follow-up for stage III A1 low-grade serous carcinoma, left. She is seen 9 months post 6 cycles of adjuvant paclitaxel and carboplatin.  She had hypersensitivity reaction to paclitaxel on C1D1.  Patient is taking adjuvant letrozole since 6/2023 through present. She is seen with spouse Dennis. History is obtained from patient.  History is considered reliable.            Oncology/Hematology History Overview Note   DIAGNOSTIC ABNORMALITIES:   Pain lower back and left leg swelling.  She presented to her PCP.   CT scan done at [facility]. Details not available.  Patient seen by Dr. Virgilio Hayward 11/23/2022. Patient found to have a large pelvic mass causing bilateral hydronephrosis with resultant nonfunctioning left kidney, elevated creatinine, left deep venous thrombosis and pulmonary embolus.  Pathology report 11/28/2022.  Fallopian tube and ovary, left salpingo-oophorectomy: Fallopian tube with no evidence of involvement by malignancy.  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  No surface involvement  demonstrated.  Uterus, fallopian tube and ovary, hysterectomy with right salpingo-oophorectomy: Cervix and endocervix with no evidence of squamous or glandular dysplasia.  Chronic cervicitis present.  Weakly proliferative endometrium, negative for hyperplasia, carcinoma, and endometritis.  Benign endometrial polyp present.  Adenomyosis.  No leiomyomata.  Right fallopian tube with focal involvement by serous neoplasm with Samona by this most consistent with low-grade serous carcinoma.  Remnant left ovary with adhesions to uterine serosa and focal involvement by low-grade serous carcinoma.  Right ovary with surface involvement by low-grade serous carcinoma.  Omentum biopsy: Involved by low-grade serous carcinoma, invasive).  Sidewall, left pelvic biopsy: Edema with hemorrhage and chronic inflammation, negative for endometriosis and malignancy.  Lymph node left pelvic biopsy: 1 lymph node with a 0.9 mm focus of metastatic serous carcinoma surrounding adipose tissue with hemorrhage and edema.           PREVIOUS INTERVENTIONS:  IVC filter was placed 11/22/2022 at [place].  She had undergone exploratory laparotomy, total abdominal hysterectomy, bilateral salpingo-oophorectomy, debulking, left pelvic lymph node dissection and omentectomy by Dr. Hayward on 11/23/2022.  Estimated blood loss 400 ml.  Laparotomy showed a massive mass arising from the left ovary, retroperitoneal, infiltrating the retroperitoneal space and adherent to the sigmoid mesentery.  Within the retroperitoneum it was densely adherent to the iliac vessel on the left side and ureter.  Deliberate rupture of the mass was required in order to access the retroperitoneal attachment.  The mass was sent for frozen section and determined to be at least borderline tumor possibly low-grade serous tumor.  There was no other evidence of disease in the pelvis or upper abdomen.  Due to being densely adherent to the retroperitoneum and iliac vessels, it is unclear  whether the entire tumor was removed or not and the pelvic sidewall biopsy was taken to determine whether the residual ovary was present and not a fibrotic reaction.  Bilateral ureterolysis was required.   Hypersensitivity reaction to Taxol 01/09/2023.   Adjuvant paclitaxel and carboplatin 01/10/2023 through 4/28/2023, 6 cycles.   Adjuvant letrozole on 6/2023 through present.     Ovarian cancer   12/15/2022 Initial Diagnosis    Ovarian cancer (HCC)     1/9/2023 -  Chemotherapy    OP OVARIAN PACLitaxel / CARBOplatin (Q21D)     1/9/2023 -  Chemotherapy    OP CENTRAL VENOUS ACCESS DEVICE ACCESS, CARE, AND MAINTENANCE (CVAD)     1/20/2023 Cancer Staged    Staging form: Ovary, Fallopian Tube, And Primary Peritoneal Carcinoma, AJCC 8th Edition  - Pathologic stage from 1/20/2023: FIGO Stage III (pT3, pN1, cM0) - Signed by Walker Magana MD on 1/20/2023     2/15/2023 - 2/15/2023 Chemotherapy    OP MAGNESIUM          PAST MEDICAL HISTORY:  ALLERGIES:  No Known Allergies  CURRENT MEDICATIONS:  Outpatient Encounter Medications as of 1/23/2024   Medication Sig Dispense Refill   • Calcium Carbonate-Vit D-Min (CALCIUM 1200 PO) Take 1 tablet by mouth Daily.     • Eliquis 5 MG tablet tablet 1 tablet Every 12 (Twelve) Hours.     • letrozole (FEMARA) 2.5 MG tablet Take 1 tablet by mouth Daily.     • lidocaine-prilocaine (EMLA) 2.5-2.5 % cream APPLY TOPICALLY TO PORT SITE 30 MINUTES PRIOR TO BEING ACCESSED 30 g 0   • lisinopril-hydrochlorothiazide (PRINZIDE,ZESTORETIC) 20-12.5 MG per tablet Take 1 tablet by mouth Daily.     • vitamin D3 125 MCG (5000 UT) capsule capsule Take  by mouth Daily.       No facility-administered encounter medications on file as of 1/23/2024.     ADULT ILLNESSES:  Patient Active Problem List   Diagnosis Code   • Ovarian cancer C56.9   • Encounter for care related to vascular access port Z45.2     SURGERIES:  Past Surgical History:   Procedure Laterality Date   • HYSTERECTOMY     • TONSILLECTOMY     • TUMOR  "REMOVAL Left 11/2022   • VENA CAVA FILTER INSERTION     • VENA CAVA FILTER REMOVAL N/A 11/17/2023    Procedure: ATTEMPTED VENA CAVA FILTER REMOVAL;  Surgeon: All Adorno DO;  Location:  [facility] HYBRID OR 12;  Service: Vascular;  Laterality: N/A;   • VENOUS ACCESS DEVICE (PORT) INSERTION N/A 12/30/2022    Procedure: INSERTION VENOUS ACCESS DEVICE;  Surgeon: Holley Mcgill MD;  Location:  [facility] OR;  Service: General;  Laterality: N/A;     HEALTH MAINTENANCE ITEMS:  Health Maintenance Due   Topic Date Due   • MAMMOGRAM  Never done   • BMI FOLLOWUP  Never done   • COLORECTAL CANCER SCREENING  Never done   • TDAP/TD VACCINES (2 - Tdap) 02/24/2008   • ZOSTER VACCINE (1 of 2) Never done   • HEPATITIS C SCREENING  Never done   • ANNUAL PHYSICAL  Never done   • COVID-19 Vaccine (4 - 2023-24 season) 09/01/2023       <no information>  Last Completed Colonoscopy       This patient has no relevant Health Maintenance data.          Immunization History   Administered Date(s) Administered   • COVID-19 (MODERNA) 1st,2nd,3rd Dose Monovalent 07/29/2021, 08/26/2021   • COVID-19 (MODERNA) Monovalent Original Booster 01/27/2022     Last Completed Mammogram       This patient has no relevant Health Maintenance data.              FAMILY HISTORY:  Family History   Problem Relation Age of Onset   • Hypertension Mother    • Multiple sclerosis Father    • Heart attack Maternal Grandfather    • Diabetes Paternal Grandmother    • Heart attack Paternal Grandfather      SOCIAL HISTORY:  Social History     Socioeconomic History   • Marital status:    Tobacco Use   • Smoking status: Never   • Smokeless tobacco: Never   Vaping Use   • Vaping Use: Never used   Substance and Sexual Activity   • Alcohol use: Never   • Drug use: Never   • Sexual activity: Defer       REVIEW OF SYSTEMS:    Review of Systems   Constitutional:  Negative for fatigue, fever and unexpected weight change.        \"I feel great.\"   HENT:  Negative for congestion " "and trouble swallowing.    Eyes:  Negative for discharge and redness.   Respiratory:  Negative for shortness of breath and wheezing.    Cardiovascular:  Negative for chest pain and leg swelling.   Gastrointestinal:  Negative for blood in stool, nausea and vomiting.   Endocrine: Negative for polydipsia and polyphagia.   Genitourinary:  Negative for difficulty urinating and hematuria.   Musculoskeletal:  Negative for gait problem and myalgias.   Skin:  Positive for pallor.   Allergic/Immunologic: Negative for food allergies.   Neurological:  Negative for dizziness, speech difficulty and weakness.   Hematological:  Negative for adenopathy. Does not bruise/bleed easily.   Psychiatric/Behavioral:  Negative for agitation, confusion and hallucinations.        VITAL SIGNS: /64   Pulse 75   Temp 97.6 °F (36.4 °C)   Resp 18   Ht 144.8 cm (57\")   Wt 89.8 kg (198 lb)   SpO2 99%   Breastfeeding No   BMI 42.85 kg/m²  gained 5 pounds. \"The weather.\"  Pain Score    01/23/24 0759   PainSc: 0-No pain       PHYSICAL EXAMINATION:     Physical Exam  Vitals reviewed.   Constitutional:       General: She is not in acute distress.  HENT:      Head: Normocephalic and atraumatic.   Eyes:      General: No scleral icterus.  Cardiovascular:      Rate and Rhythm: Normal rate.   Pulmonary:      Effort: No respiratory distress.      Breath sounds: No wheezing.      Comments: Port, left. No erythema.  Abdominal:      General: Bowel sounds are normal.      Palpations: Abdomen is soft.      Tenderness: There is no abdominal tenderness.   Musculoskeletal:         General: No swelling.      Cervical back: Neck supple.   Skin:     Coloration: Skin is pale.   Neurological:      Mental Status: She is alert and oriented to person, place, and time.   Psychiatric:         Mood and Affect: Mood normal.         Behavior: Behavior normal.         Thought Content: Thought content normal.         Judgment: Judgment normal.         LABS    Lab " Results - Last 18 Months   Lab Units 01/18/24  0933 11/14/23  1239 11/14/23  0857 09/27/23  0902 08/16/23  0802 06/21/23  0750   HEMOGLOBIN g/dL 11.6* 12.0 12.1 11.7* 12.0 11.4*   HEMATOCRIT % 34.8 36.4 37.0 36.4 37.0 34.9   MCV fL 87.0 88.6 88.5 89.4 91.1 101.2*   WBC 10*3/mm3 7.96 7.65 7.74 7.41 7.00 5.88   RDW % 12.3 12.2* 12.1* 12.6 12.5 12.7   MPV fL 9.3 9.6 9.3 9.4 9.4 9.5   PLATELETS 10*3/mm3 204 197 194 205 207 154   IMM GRAN % % 0.3 0.3 0.4 0.3 0.1 0.2   NEUTROS ABS 10*3/mm3 5.12 4.97 5.30 4.73 4.14 3.42   LYMPHS ABS 10*3/mm3 2.00 1.88 1.58 1.89 2.02 1.54   MONOS ABS 10*3/mm3 0.71 0.70 0.73 0.67 0.73 0.67   EOS ABS 10*3/mm3 0.06 0.04 0.05 0.06 0.07 0.20   BASOS ABS 10*3/mm3 0.05 0.04 0.05 0.04 0.03 0.04   IMMATURE GRANS (ABS) 10*3/mm3 0.02 0.02 0.03 0.02 0.01 0.01   NRBC /100 WBC 0.0 0.0 0.0 0.0 0.0 0.0       Lab Results - Last 18 Months   Lab Units 01/18/24  0933 12/27/23  0921 11/14/23  1239 11/14/23  0857 09/27/23  0902 08/16/23  0802 06/21/23  0750 05/12/23  1444 04/28/23  0754   GLUCOSE mg/dL 100*  --  89 97 69 101* 78  --  125*   SODIUM mmol/L 140  --  142 139 141 140 143  --  137   POTASSIUM mmol/L 3.9  --  3.9 4.4 3.8 4.1 4.2  --  4.6   CO2 mmol/L 26.0  --  30.0* 30.0* 27.0 28.0 28.0  --  22.0   CHLORIDE mmol/L 104  --  102 103 104 104 105  --  102   ANION GAP mmol/L 10.0  --  10.0 6.0 10.0 8.0 10.0  --  13.0   CREATININE mg/dL 1.25* 1.40* 1.36* 1.27* 1.39* 1.50* 1.29*   < > 1.26*   BUN mg/dL 23  --  23 22 20 24* 27*  --  27*   BUN / CREAT RATIO  18.4  --  16.9 17.3 14.4 16.0 20.9  --  21.4   CALCIUM mg/dL 9.7  --  9.7 9.8 10.0 10.4 10.0  --  10.1   ALK PHOS U/L 78  --   --  75 69 71 66  --  84   TOTAL PROTEIN g/dL 6.9  --   --  7.1 6.8 7.2 7.1  --  7.2   ALT (SGPT) U/L 18  --   --  14 10 17 14  --  15   AST (SGOT) U/L 19  --   --  17 16 16 16  --  15   BILIRUBIN mg/dL 0.3  --   --  0.2 0.3 0.2 0.2  --  0.3   ALBUMIN g/dL 4.1  --   --  4.3 4.1 4.4 4.2  --  4.6   GLOBULIN gm/dL 2.8  --   --  2.8 2.7  "2.8 2.9  --  2.6    < > = values in this interval not displayed.       No results for input(s): \"MSPIKE\", \"KAPPALAMB\", \"IGLFLC\", \"URICACID\", \"FREEKAPPAL\", \"CEA\", \"LDH\", \"REFLABREPO\" in the last 04083 hours.    Lab Results - Last 18 Months   Lab Units 06/21/23  0750 01/09/23  0845   IRON mcg/dL 76 35*   TIBC mcg/dL 325 308   IRON SATURATION (TSAT) % 23 11*   FERRITIN ng/mL 262.30* 379.80*       Tiff Elizalde reports a pain score of 0.         ASSESSMENT:  1.  Ovarian cancer, low-grade serous carcinoma, left.  Tumor size 18 cm. Negative genetic abnormality.   AJCC stage:IIIA1 (pT3, pN1, cM0, G1)  Treatment status: Postadjuvant paclitaxel and carboplatin 01/10/2023 through 4/28/2023, 6 cycles. Adjuvant letrozole 2.5 mg 6/2023 through present by Dr. Hayward.  2.  Performance status of 0.  3.  Left deep venous thrombosis from malignancy.  She is taking apixaban.  4.  Pulmonary embolism from malignancy.  Undergoing treatment with apixaban.  Post IVC filter 11/22/2022.  Unable to remove IVC filter on multiple attempts on 11/17/2023.  5.  Anemia from iron deficiency and chronic kidney disease stage IIIa, GFR 59.3 ml/min on 2/3/2023. Oral iron 01/10/2023 through 6/21/2023.    6.  Hypersensitivity reaction to paclitaxel on 01/09/2023. Responded to dexamethasone as premed.   7.  Grade 1 neuropathy. Under observation. Gabapentin on hold.    8.  RIGHT lower lobe 3 mm pulmonary nodule.  Under observation.  9.  RIGHT thyroid with a 4 cm heterogeneous nodule.  Management per primary care.  10. Nodule located in the segment 4 a of the liver measures 7 mm on 5/12/2023, MRI abdomen on 6/12/2023 showed benign cyst.  11.  Osteopenia.  On calcium and D.            PLAN:  1.   Re: Venous Doppler report on 11/14/2023.There is evidence of chronic partial deep venous thrombosis of the left lower extremity. This is unchanged from previous exam.  Bone density 9/1/2023 showed osteopenia.  Continue calcium and D.  Thyroid sonogram at " [facility] 12/2023.   2.   Re: Tolerance to letrozole. \"Hot flashes.\"  3.   Re: Heme status.  WBC 7.9, hemoglobin 11.6, hematocrit 34.8, MCV 87 and platelet 204.   4.   Re: CMP.  GFR 49.4 from 44.7 from 48.5 from 43.8 from 40 from 47.9 mL per minute   5.   Re:  at not done from 35 from 38.9 from 43 from 33.    6.   Re: CT chest report on 12/27/2023. No evidence of metastatic disease in the chest.  Stable 3 mm right lower lobe pulmonary nodule.  Large right-sided thyroid nodule for which further characterization with thyroid ultrasound is recommended if not previously performed.  7.   Re: CT abdomen pelvis report 12/27/2023. No evidence of metastatic disease in the abdomen or pelvis.   8.   Re: Note from Dr. Adorno on 11/17/2023.  Attempted IVC filter retrieval.Note from vascular surgery 12/17/2023. Continue Eliquis.   9.   Re: Stable for observation, ovarian cancer.   10.  Obtain thyroid sonogram report 12/2023.   11.  Flush port every 6 weeks.  12.  eRx for Zofran 8 mg p.o. every 8 hours as needed for nausea and vomiting #60, 2 refills if needed.  13.  eRx gabapentin 300 mg po three times daily, 90, 2 refills if needed.  Observe for lightheadedness, dizziness or hypotension.  14.  eRx letrozole 2.5 mg po daily, 90, 3 refills if needed.  Monitor for hot flashes or bone loss.   15.  Continue ongoing management per primary care physician and other specialists.  16.  Plan of care discussed with patient and spouse Dennis.  Understanding expressed.  Patient agreeable to proceed.  17. Questions were answered to her satisfaction. \"Yes.\"  18.  She will be seen every 2 to 4 months for the first 2 years then every 3 to 6 months for the next 3 years.  Annually after 5 years.  Imaging as clinically indicated.  19.  Return to office in 12 weeks with CBC with differential, CMP, , and pre office left leg venous doppler.   20.   today.        "     I have reviewed the assessment and plan and verified the accuracy of it. No changes to assessment and plan since the information was documented. Walker Magana MD 01/23/24       I spent 34 total minutes, face-to-face, caring for Tiff today. Greater than 50% of this time involved counseling and/or coordination of care as documented within this note.                 MD Tyrone Galindo MD

## 2024-01-23 NOTE — LETTER
January 23, 2024       No Recipients    Patient: Tiff Elizalde   YOB: 1965   Date of Visit: 1/23/2024     Dear Virgilio Hayward MD:       Thank you for referring Tiff Elizalde to me for evaluation. Below are the relevant portions of my assessment and plan of care.    If you have questions, please do not hesitate to call me. I look forward to following Tiff along with you.         Sincerely,        Walker Magana MD        CC:   No Recipients    Walker Magana MD  01/23/24 0822  Sign when Signing Visit  [facility] HEMATOLOGY & ONCOLOGY  [address]  [place] [address] [phone]    Patient Name: Tiff Elizalde  Encounter Date: 01/23/2024  YOB: 1965  Patient Number: [id number]      REASON FOR FOLLOW-UP: Tiff Elizalde is a pleasant 58` y.o.  female who is seen on follow-up for stage III A1 low-grade serous carcinoma, left. She is seen 9 months post 6 cycles of adjuvant paclitaxel and carboplatin.  She had hypersensitivity reaction to paclitaxel on C1D1.  Patient is taking adjuvant letrozole since 6/2023 through present. She is seen with spouse Dennis. History is obtained from patient.  History is considered reliable.            Oncology/Hematology History Overview Note   DIAGNOSTIC ABNORMALITIES:   Pain lower back and left leg swelling.  She presented to her PCP.   CT scan done at [facility]. Details not available.  Patient seen by Dr. Virgilio Hayward 11/23/2022. Patient found to have a large pelvic mass causing bilateral hydronephrosis with resultant nonfunctioning left kidney, elevated creatinine, left deep venous thrombosis and pulmonary embolus.  Pathology report 11/28/2022.  Fallopian tube and ovary, left salpingo-oophorectomy: Fallopian tube with no evidence of involvement by malignancy.  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  No surface involvement  demonstrated.  Uterus, fallopian tube and ovary, hysterectomy with right salpingo-oophorectomy: Cervix and endocervix with no evidence of squamous or glandular dysplasia.  Chronic cervicitis present.  Weakly proliferative endometrium, negative for hyperplasia, carcinoma, and endometritis.  Benign endometrial polyp present.  Adenomyosis.  No leiomyomata.  Right fallopian tube with focal involvement by serous neoplasm with Samona by this most consistent with low-grade serous carcinoma.  Remnant left ovary with adhesions to uterine serosa and focal involvement by low-grade serous carcinoma.  Right ovary with surface involvement by low-grade serous carcinoma.  Omentum biopsy: Involved by low-grade serous carcinoma, invasive).  Sidewall, left pelvic biopsy: Edema with hemorrhage and chronic inflammation, negative for endometriosis and malignancy.  Lymph node left pelvic biopsy: 1 lymph node with a 0.9 mm focus of metastatic serous carcinoma surrounding adipose tissue with hemorrhage and edema.           PREVIOUS INTERVENTIONS:  IVC filter was placed 11/22/2022 at [facility] [place] [facility] [place].  She had undergone exploratory laparotomy, total abdominal hysterectomy, bilateral salpingo-oophorectomy, debulking, left pelvic lymph node dissection and omentectomy by Dr. Hayward on 11/23/2022.  Estimated blood loss 400 ml.  Laparotomy showed a massive mass arising from the left ovary, retroperitoneal, infiltrating the retroperitoneal space and adherent to the sigmoid mesentery.  Within the retroperitoneum it was densely adherent to the iliac vessel on the left side and ureter.  Deliberate rupture of the mass was required in order to access the retroperitoneal attachment.  The mass was sent for frozen section and determined to be at least borderline tumor possibly low-grade serous tumor.  There was no other evidence of disease in the pelvis or upper abdomen.  Due to being densely adherent to the retroperitoneum and iliac vessels, it is unclear  whether the entire tumor was removed or not and the pelvic sidewall biopsy was taken to determine whether the residual ovary was present and not a fibrotic reaction.  Bilateral ureterolysis was required.   Hypersensitivity reaction to Taxol 01/09/2023.   Adjuvant paclitaxel and carboplatin 01/10/2023 through 4/28/2023, 6 cycles.   Adjuvant letrozole on 6/2023 through present.     Ovarian cancer   12/15/2022 Initial Diagnosis    Ovarian cancer (HCC)     1/9/2023 -  Chemotherapy    OP OVARIAN PACLitaxel / CARBOplatin (Q21D)     1/9/2023 -  Chemotherapy    OP CENTRAL VENOUS ACCESS DEVICE ACCESS, CARE, AND MAINTENANCE (CVAD)     1/20/2023 Cancer Staged    Staging form: Ovary, Fallopian Tube, And Primary Peritoneal Carcinoma, AJCC 8th Edition  - Pathologic stage from 1/20/2023: FIGO Stage III (pT3, pN1, cM0) - Signed by Walker Magana MD on 1/20/2023     2/15/2023 - 2/15/2023 Chemotherapy    OP MAGNESIUM          PAST MEDICAL HISTORY:  ALLERGIES:  No Known Allergies  CURRENT MEDICATIONS:  Outpatient Encounter Medications as of 1/23/2024   Medication Sig Dispense Refill   • Calcium Carbonate-Vit D-Min (CALCIUM 1200 PO) Take 1 tablet by mouth Daily.     • Eliquis 5 MG tablet tablet 1 tablet Every 12 (Twelve) Hours.     • letrozole (FEMARA) 2.5 MG tablet Take 1 tablet by mouth Daily.     • lidocaine-prilocaine (EMLA) 2.5-2.5 % cream APPLY TOPICALLY TO PORT SITE 30 MINUTES PRIOR TO BEING ACCESSED 30 g 0   • lisinopril-hydrochlorothiazide (PRINZIDE,ZESTORETIC) 20-12.5 MG per tablet Take 1 tablet by mouth Daily.     • vitamin D3 125 MCG (5000 UT) capsule capsule Take  by mouth Daily.       No facility-administered encounter medications on file as of 1/23/2024.     ADULT ILLNESSES:  Patient Active Problem List   Diagnosis Code   • Ovarian cancer C56.9   • Encounter for care related to vascular access port Z45.2     SURGERIES:  Past Surgical History:   Procedure Laterality Date   • HYSTERECTOMY     • TONSILLECTOMY     • TUMOR  "REMOVAL Left 11/2022   • VENA CAVA FILTER INSERTION     • VENA CAVA FILTER REMOVAL N/A 11/17/2023    Procedure: ATTEMPTED VENA CAVA FILTER REMOVAL;  Surgeon: All Adorno DO;  Location:  [facility] HYBRID OR 12;  Service: Vascular;  Laterality: N/A;   • VENOUS ACCESS DEVICE (PORT) INSERTION N/A 12/30/2022    Procedure: INSERTION VENOUS ACCESS DEVICE;  Surgeon: Holley Mcgill MD;  Location:  [facility] OR;  Service: General;  Laterality: N/A;     HEALTH MAINTENANCE ITEMS:  Health Maintenance Due   Topic Date Due   • MAMMOGRAM  Never done   • BMI FOLLOWUP  Never done   • COLORECTAL CANCER SCREENING  Never done   • TDAP/TD VACCINES (2 - Tdap) 02/24/2008   • ZOSTER VACCINE (1 of 2) Never done   • HEPATITIS C SCREENING  Never done   • ANNUAL PHYSICAL  Never done   • COVID-19 Vaccine (4 - 2023-24 season) 09/01/2023       <no information>  Last Completed Colonoscopy       This patient has no relevant Health Maintenance data.          Immunization History   Administered Date(s) Administered   • COVID-19 (MODERNA) 1st,2nd,3rd Dose Monovalent 07/29/2021, 08/26/2021   • COVID-19 (MODERNA) Monovalent Original Booster 01/27/2022     Last Completed Mammogram       This patient has no relevant Health Maintenance data.              FAMILY HISTORY:  Family History   Problem Relation Age of Onset   • Hypertension Mother    • Multiple sclerosis Father    • Heart attack Maternal Grandfather    • Diabetes Paternal Grandmother    • Heart attack Paternal Grandfather      SOCIAL HISTORY:  Social History     Socioeconomic History   • Marital status:    Tobacco Use   • Smoking status: Never   • Smokeless tobacco: Never   Vaping Use   • Vaping Use: Never used   Substance and Sexual Activity   • Alcohol use: Never   • Drug use: Never   • Sexual activity: Defer       REVIEW OF SYSTEMS:    Review of Systems   Constitutional:  Negative for fatigue, fever and unexpected weight change.        \"I feel great.\"   HENT:  Negative for congestion " "and trouble swallowing.    Eyes:  Negative for discharge and redness.   Respiratory:  Negative for shortness of breath and wheezing.    Cardiovascular:  Negative for chest pain and leg swelling.   Gastrointestinal:  Negative for blood in stool, nausea and vomiting.   Endocrine: Negative for polydipsia and polyphagia.   Genitourinary:  Negative for difficulty urinating and hematuria.   Musculoskeletal:  Negative for gait problem and myalgias.   Skin:  Positive for pallor.   Allergic/Immunologic: Negative for food allergies.   Neurological:  Negative for dizziness, speech difficulty and weakness.   Hematological:  Negative for adenopathy. Does not bruise/bleed easily.   Psychiatric/Behavioral:  Negative for agitation, confusion and hallucinations.        VITAL SIGNS: /64   Pulse 75   Temp 97.6 °F (36.4 °C)   Resp 18   Ht 144.8 cm (57\")   Wt 89.8 kg (198 lb)   SpO2 99%   Breastfeeding No   BMI 42.85 kg/m²  gained 5 pounds. \"The weather.\"  Pain Score    01/23/24 0759   PainSc: 0-No pain       PHYSICAL EXAMINATION:     Physical Exam  Vitals reviewed.   Constitutional:       General: She is not in acute distress.  HENT:      Head: Normocephalic and atraumatic.   Eyes:      General: No scleral icterus.  Cardiovascular:      Rate and Rhythm: Normal rate.   Pulmonary:      Effort: No respiratory distress.      Breath sounds: No wheezing.      Comments: Port, left. No erythema.  Abdominal:      General: Bowel sounds are normal.      Palpations: Abdomen is soft.      Tenderness: There is no abdominal tenderness.   Musculoskeletal:         General: No swelling.      Cervical back: Neck supple.   Skin:     Coloration: Skin is pale.   Neurological:      Mental Status: She is alert and oriented to person, place, and time.   Psychiatric:         Mood and Affect: Mood normal.         Behavior: Behavior normal.         Thought Content: Thought content normal.         Judgment: Judgment normal.         LABS    Lab " Results - Last 18 Months   Lab Units 01/18/24  0933 11/14/23  1239 11/14/23  0857 09/27/23  0902 08/16/23  0802 06/21/23  0750   HEMOGLOBIN g/dL 11.6* 12.0 12.1 11.7* 12.0 11.4*   HEMATOCRIT % 34.8 36.4 37.0 36.4 37.0 34.9   MCV fL 87.0 88.6 88.5 89.4 91.1 101.2*   WBC 10*3/mm3 7.96 7.65 7.74 7.41 7.00 5.88   RDW % 12.3 12.2* 12.1* 12.6 12.5 12.7   MPV fL 9.3 9.6 9.3 9.4 9.4 9.5   PLATELETS 10*3/mm3 204 197 194 205 207 154   IMM GRAN % % 0.3 0.3 0.4 0.3 0.1 0.2   NEUTROS ABS 10*3/mm3 5.12 4.97 5.30 4.73 4.14 3.42   LYMPHS ABS 10*3/mm3 2.00 1.88 1.58 1.89 2.02 1.54   MONOS ABS 10*3/mm3 0.71 0.70 0.73 0.67 0.73 0.67   EOS ABS 10*3/mm3 0.06 0.04 0.05 0.06 0.07 0.20   BASOS ABS 10*3/mm3 0.05 0.04 0.05 0.04 0.03 0.04   IMMATURE GRANS (ABS) 10*3/mm3 0.02 0.02 0.03 0.02 0.01 0.01   NRBC /100 WBC 0.0 0.0 0.0 0.0 0.0 0.0       Lab Results - Last 18 Months   Lab Units 01/18/24  0933 12/27/23  0921 11/14/23  1239 11/14/23  0857 09/27/23  0902 08/16/23  0802 06/21/23  0750 05/12/23  1444 04/28/23  0754   GLUCOSE mg/dL 100*  --  89 97 69 101* 78  --  125*   SODIUM mmol/L 140  --  142 139 141 140 143  --  137   POTASSIUM mmol/L 3.9  --  3.9 4.4 3.8 4.1 4.2  --  4.6   CO2 mmol/L 26.0  --  30.0* 30.0* 27.0 28.0 28.0  --  22.0   CHLORIDE mmol/L 104  --  102 103 104 104 105  --  102   ANION GAP mmol/L 10.0  --  10.0 6.0 10.0 8.0 10.0  --  13.0   CREATININE mg/dL 1.25* 1.40* 1.36* 1.27* 1.39* 1.50* 1.29*   < > 1.26*   BUN mg/dL 23  --  23 22 20 24* 27*  --  27*   BUN / CREAT RATIO  18.4  --  16.9 17.3 14.4 16.0 20.9  --  21.4   CALCIUM mg/dL 9.7  --  9.7 9.8 10.0 10.4 10.0  --  10.1   ALK PHOS U/L 78  --   --  75 69 71 66  --  84   TOTAL PROTEIN g/dL 6.9  --   --  7.1 6.8 7.2 7.1  --  7.2   ALT (SGPT) U/L 18  --   --  14 10 17 14  --  15   AST (SGOT) U/L 19  --   --  17 16 16 16  --  15   BILIRUBIN mg/dL 0.3  --   --  0.2 0.3 0.2 0.2  --  0.3   ALBUMIN g/dL 4.1  --   --  4.3 4.1 4.4 4.2  --  4.6   GLOBULIN gm/dL 2.8  --   --  2.8 2.7  "2.8 2.9  --  2.6    < > = values in this interval not displayed.       No results for input(s): \"MSPIKE\", \"KAPPALAMB\", \"IGLFLC\", \"URICACID\", \"FREEKAPPAL\", \"CEA\", \"LDH\", \"REFLABREPO\" in the last 23314 hours.    Lab Results - Last 18 Months   Lab Units 06/21/23  0750 01/09/23  0845   IRON mcg/dL 76 35*   TIBC mcg/dL 325 308   IRON SATURATION (TSAT) % 23 11*   FERRITIN ng/mL 262.30* 379.80*       Tiff Elizalde reports a pain score of 0.         ASSESSMENT:  1.  Ovarian cancer, low-grade serous carcinoma, left.  Tumor size 18 cm. Negative genetic abnormality.   AJCC stage:IIIA1 (pT3, pN1, cM0, G1)  Treatment status: Postadjuvant paclitaxel and carboplatin 01/10/2023 through 4/28/2023, 6 cycles. Adjuvant letrozole 2.5 mg 6/2023 through present by Dr. Hayward.  2.  Performance status of 0.  3.  Left deep venous thrombosis from malignancy.  She is taking apixaban.  4.  Pulmonary embolism from malignancy.  Undergoing treatment with apixaban.  Post IVC filter 11/22/2022.  Unable to remove IVC filter on multiple attempts on 11/17/2023.  5.  Anemia from iron deficiency and chronic kidney disease stage IIIa, GFR 59.3 ml/min on 2/3/2023. Oral iron 01/10/2023 through 6/21/2023.    6.  Hypersensitivity reaction to paclitaxel on 01/09/2023. Responded to dexamethasone as premed.   7.  Grade 1 neuropathy. Under observation. Gabapentin on hold.    8.  RIGHT lower lobe 3 mm pulmonary nodule.  Under observation.  9.  RIGHT thyroid with a 4 cm heterogeneous nodule.  Management per primary care.  10. Nodule located in the segment 4 a of the liver measures 7 mm on 5/12/2023, MRI abdomen on 6/12/2023 showed benign cyst.  11.  Osteopenia.  On calcium and D.            PLAN:  1.   Re: Venous Doppler report on 11/14/2023.There is evidence of chronic partial deep venous thrombosis of the left lower extremity. This is unchanged from previous exam.  Bone density 9/1/2023 showed osteopenia.  Continue calcium and D.  Thyroid sonogram at " [facility] 12/2023.   2.   Re: Tolerance to letrozole. \"Hot flashes.\"  3.   Re: Heme status.  WBC 7.9, hemoglobin 11.6, hematocrit 34.8, MCV 87 and platelet 204.   4.   Re: CMP.  GFR 49.4 from 44.7 from 48.5 from 43.8 from 40 from 47.9 mL per minute   5.   Re:  at not done from 35 from 38.9 from 43 from 33.    6.   Re: CT chest report on 12/27/2023. No evidence of metastatic disease in the chest.  Stable 3 mm right lower lobe pulmonary nodule.  Large right-sided thyroid nodule for which further characterization with thyroid ultrasound is recommended if not previously performed.  7.   Re: CT abdomen pelvis report 12/27/2023. No evidence of metastatic disease in the abdomen or pelvis.   8.   Re: Note from Dr. Adorno on 11/17/2023.  Attempted IVC filter retrieval.Note from vascular surgery 12/17/2023. Continue Eliquis.   9.   Re: Stable for observation, ovarian cancer.   10.  Obtain thyroid sonogram report 12/2023.   11.  Flush port every 6 weeks.  12.  eRx for Zofran 8 mg p.o. every 8 hours as needed for nausea and vomiting #60, 2 refills if needed.  13.  eRx gabapentin 300 mg po three times daily, 90, 2 refills if needed.  Observe for lightheadedness, dizziness or hypotension.  14.  eRx letrozole 2.5 mg po daily, 90, 3 refills if needed.  Monitor for hot flashes or bone loss.   15.  Continue ongoing management per primary care physician and other specialists.  16.  Plan of care discussed with patient and spouse Dennis.  Understanding expressed.  Patient agreeable to proceed.  17. Questions were answered to her satisfaction. \"Yes.\"  18.  She will be seen every 2 to 4 months for the first 2 years then every 3 to 6 months for the next 3 years.  Annually after 5 years.  Imaging as clinically indicated.  19.  Return to office in 12 weeks with CBC with differential, CMP, , and pre office left leg venous doppler.   20.   today.        "     I have reviewed the assessment and plan and verified the accuracy of it. No changes to assessment and plan since the information was documented. Walkre Magana MD 01/23/24       I spent 34 total minutes, face-to-face, caring for Tiff today. Greater than 50% of this time involved counseling and/or coordination of care as documented within this note.                 MD Tyrone Galindo MD

## 2024-03-05 ENCOUNTER — INFUSION (OUTPATIENT)
Dept: ONCOLOGY | Facility: CLINIC | Age: 59
End: 2024-03-05
Payer: COMMERCIAL

## 2024-03-05 DIAGNOSIS — Z45.2 ENCOUNTER FOR CARE RELATED TO VASCULAR ACCESS PORT: Primary | ICD-10-CM

## 2024-03-05 RX ORDER — HEPARIN SODIUM (PORCINE) LOCK FLUSH IV SOLN 100 UNIT/ML 100 UNIT/ML
500 SOLUTION INTRAVENOUS AS NEEDED
OUTPATIENT
Start: 2024-03-05

## 2024-03-05 RX ORDER — HEPARIN SODIUM (PORCINE) LOCK FLUSH IV SOLN 100 UNIT/ML 100 UNIT/ML
500 SOLUTION INTRAVENOUS AS NEEDED
Status: DISCONTINUED | OUTPATIENT
Start: 2024-03-05 | End: 2024-03-05 | Stop reason: HOSPADM

## 2024-03-05 RX ORDER — SODIUM CHLORIDE 0.9 % (FLUSH) 0.9 %
10 SYRINGE (ML) INJECTION AS NEEDED
Status: DISCONTINUED | OUTPATIENT
Start: 2024-03-05 | End: 2024-03-05 | Stop reason: HOSPADM

## 2024-03-05 RX ORDER — SODIUM CHLORIDE 0.9 % (FLUSH) 0.9 %
10 SYRINGE (ML) INJECTION AS NEEDED
OUTPATIENT
Start: 2024-03-05

## 2024-03-05 RX ADMIN — HEPARIN SODIUM (PORCINE) LOCK FLUSH IV SOLN 100 UNIT/ML 500 UNITS: 100 SOLUTION at 08:35

## 2024-03-05 RX ADMIN — Medication 10 ML: at 08:35

## 2024-04-12 ENCOUNTER — HOSPITAL ENCOUNTER (OUTPATIENT)
Dept: ULTRASOUND IMAGING | Facility: HOSPITAL | Age: 59
Discharge: HOME | End: 2024-04-12
Payer: COMMERCIAL

## 2024-04-12 DIAGNOSIS — C56.1 MALIGNANT NEOPLASM OF RIGHT OVARY: ICD-10-CM

## 2024-04-12 PROCEDURE — 93971 EXTREMITY STUDY: CPT

## 2024-04-16 ENCOUNTER — INFUSION (OUTPATIENT)
Dept: ONCOLOGY | Facility: CLINIC | Age: 59
End: 2024-04-16
Payer: COMMERCIAL

## 2024-04-16 ENCOUNTER — LAB (OUTPATIENT)
Dept: LAB | Facility: HOSPITAL | Age: 59
End: 2024-04-16
Payer: COMMERCIAL

## 2024-04-16 DIAGNOSIS — Z45.2 ENCOUNTER FOR CARE RELATED TO VASCULAR ACCESS PORT: Primary | ICD-10-CM

## 2024-04-16 DIAGNOSIS — C56.2 MALIGNANT NEOPLASM OF LEFT OVARY: ICD-10-CM

## 2024-04-16 DIAGNOSIS — C56.1 MALIGNANT NEOPLASM OF RIGHT OVARY: ICD-10-CM

## 2024-04-16 LAB
ALBUMIN SERPL-MCNC: 4.4 G/DL (ref 3.5–5.2)
ALBUMIN/GLOB SERPL: 1.6 G/DL
ALP SERPL-CCNC: 93 U/L (ref 39–117)
ALT SERPL W P-5'-P-CCNC: 15 U/L (ref 1–33)
ANION GAP SERPL CALCULATED.3IONS-SCNC: 11 MMOL/L (ref 5–15)
AST SERPL-CCNC: 19 U/L (ref 1–32)
BASOPHILS # BLD AUTO: 0.04 10*3/MM3 (ref 0–0.2)
BASOPHILS NFR BLD AUTO: 0.5 % (ref 0–1.5)
BILIRUB SERPL-MCNC: 0.4 MG/DL (ref 0–1.2)
BUN SERPL-MCNC: 29 MG/DL (ref 8–23)
BUN/CREAT SERPL: 19.9 (ref 7–25)
CALCIUM SPEC-SCNC: 10 MG/DL (ref 8.6–10.5)
CANCER AG125 SERPL QL: 33.6 U/ML (ref 0–38.1)
CHLORIDE SERPL-SCNC: 104 MMOL/L (ref 98–107)
CO2 SERPL-SCNC: 26 MMOL/L (ref 22–29)
CREAT SERPL-MCNC: 1.46 MG/DL (ref 0.57–1)
DEPRECATED RDW RBC AUTO: 39.7 FL (ref 37–54)
EGFRCR SERPLBLD CKD-EPI 2021: 41 ML/MIN/1.73
EOSINOPHIL # BLD AUTO: 0.04 10*3/MM3 (ref 0–0.4)
EOSINOPHIL NFR BLD AUTO: 0.5 % (ref 0.3–6.2)
ERYTHROCYTE [DISTWIDTH] IN BLOOD BY AUTOMATED COUNT: 12.5 % (ref 12.3–15.4)
GLOBULIN UR ELPH-MCNC: 2.7 GM/DL
GLUCOSE SERPL-MCNC: 95 MG/DL (ref 65–99)
HCT VFR BLD AUTO: 36.1 % (ref 34–46.6)
HGB BLD-MCNC: 12.1 G/DL (ref 12–15.9)
IMM GRANULOCYTES # BLD AUTO: 0.02 10*3/MM3 (ref 0–0.05)
IMM GRANULOCYTES NFR BLD AUTO: 0.2 % (ref 0–0.5)
LYMPHOCYTES # BLD AUTO: 1.98 10*3/MM3 (ref 0.7–3.1)
LYMPHOCYTES NFR BLD AUTO: 24 % (ref 19.6–45.3)
MCH RBC QN AUTO: 29 PG (ref 26.6–33)
MCHC RBC AUTO-ENTMCNC: 33.5 G/DL (ref 31.5–35.7)
MCV RBC AUTO: 86.6 FL (ref 79–97)
MONOCYTES # BLD AUTO: 0.68 10*3/MM3 (ref 0.1–0.9)
MONOCYTES NFR BLD AUTO: 8.2 % (ref 5–12)
NEUTROPHILS NFR BLD AUTO: 5.49 10*3/MM3 (ref 1.7–7)
NEUTROPHILS NFR BLD AUTO: 66.6 % (ref 42.7–76)
NRBC BLD AUTO-RTO: 0 /100 WBC (ref 0–0.2)
PLATELET # BLD AUTO: 209 10*3/MM3 (ref 140–450)
PMV BLD AUTO: 9.6 FL (ref 6–12)
POTASSIUM SERPL-SCNC: 4 MMOL/L (ref 3.5–5.2)
PROT SERPL-MCNC: 7.1 G/DL (ref 6–8.5)
RBC # BLD AUTO: 4.17 10*6/MM3 (ref 3.77–5.28)
SODIUM SERPL-SCNC: 141 MMOL/L (ref 136–145)
WBC NRBC COR # BLD AUTO: 8.25 10*3/MM3 (ref 3.4–10.8)

## 2024-04-16 PROCEDURE — 85025 COMPLETE CBC W/AUTO DIFF WBC: CPT

## 2024-04-16 PROCEDURE — 86304 IMMUNOASSAY TUMOR CA 125: CPT

## 2024-04-16 PROCEDURE — 36415 COLL VENOUS BLD VENIPUNCTURE: CPT

## 2024-04-16 PROCEDURE — 80053 COMPREHEN METABOLIC PANEL: CPT

## 2024-04-16 RX ORDER — HEPARIN SODIUM (PORCINE) LOCK FLUSH IV SOLN 100 UNIT/ML 100 UNIT/ML
500 SOLUTION INTRAVENOUS AS NEEDED
Status: DISCONTINUED | OUTPATIENT
Start: 2024-04-16 | End: 2024-04-16 | Stop reason: HOSPADM

## 2024-04-16 RX ORDER — HEPARIN SODIUM (PORCINE) LOCK FLUSH IV SOLN 100 UNIT/ML 100 UNIT/ML
500 SOLUTION INTRAVENOUS AS NEEDED
OUTPATIENT
Start: 2024-04-16

## 2024-04-16 RX ORDER — SODIUM CHLORIDE 0.9 % (FLUSH) 0.9 %
10 SYRINGE (ML) INJECTION AS NEEDED
OUTPATIENT
Start: 2024-04-16

## 2024-04-16 RX ORDER — SODIUM CHLORIDE 0.9 % (FLUSH) 0.9 %
10 SYRINGE (ML) INJECTION AS NEEDED
Status: DISCONTINUED | OUTPATIENT
Start: 2024-04-16 | End: 2024-04-16 | Stop reason: HOSPADM

## 2024-04-16 RX ADMIN — HEPARIN SODIUM (PORCINE) LOCK FLUSH IV SOLN 100 UNIT/ML 500 UNITS: 100 SOLUTION at 08:23

## 2024-04-16 RX ADMIN — Medication 10 ML: at 08:23

## 2024-04-16 NOTE — PROGRESS NOTES
[facility] HEMATOLOGY & ONCOLOGY  [address]  [facility] [id number] [phone]    Patient Name: Tiff Elizalde  Encounter Date: 04/30/2024  YOB: 1965  Patient Number: [id number]      REASON FOR FOLLOW-UP: Tiff Elizalde is a pleasant 58 y.o.  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 She is seen 12 months post 6 cycles of adjuvant paclitaxel and carboplatin.  She had hypersensitivity reaction to paclitaxel on C1D1.  The patient is on adjuvant letrozole since 6/2023 through present. She is seen with her spouse, Dennis. History is obtained from patient.  The patient is a reliable historian.      Oncology/Hematology History Overview Note   DIAGNOSTIC ABNORMALITIES:   Pain lower back and left leg swelling.  She presented to her PCP.   CT scan done at [facility]. Details not available.  Patient seen by Dr. Virgilio Hayward 11/23/2022. Patient found to have a large pelvic mass causing bilateral hydronephrosis with resultant nonfunctioning left kidney, elevated creatinine, left deep venous thrombosis and pulmonary embolus.  Pathology report 11/28/2022.  Fallopian tube and ovary, left salpingo-oophorectomy: Fallopian tube with no evidence of involvement by malignancy.  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  No surface involvement demonstrated.  Uterus, fallopian tube and ovary, hysterectomy with right salpingo-oophorectomy: Cervix and endocervix with no evidence of squamous or glandular dysplasia.  Chronic cervicitis present.  Weakly proliferative endometrium, negative for hyperplasia, carcinoma, and endometritis.  Benign endometrial polyp present.  Adenomyosis.  No leiomyomata.  Right fallopian tube with focal involvement by serous neoplasm with Samona by this most consistent with low-grade serous carcinoma.  Remnant left ovary with  adhesions to uterine serosa and focal involvement by low-grade serous carcinoma.  Right ovary with surface involvement by low-grade serous carcinoma.  Omentum biopsy: Involved by low-grade serous carcinoma, invasive).  Sidewall, left pelvic biopsy: Edema with hemorrhage and chronic inflammation, negative for endometriosis and malignancy.  Lymph node left pelvic biopsy: 1 lymph node with a 0.9 mm focus of metastatic serous carcinoma surrounding adipose tissue with hemorrhage and edema.           PREVIOUS INTERVENTIONS:  IVC filter was placed 11/22/2022 at [facility].  She had undergone exploratory laparotomy, total abdominal hysterectomy, bilateral salpingo-oophorectomy, debulking, left pelvic lymph node dissection and omentectomy by Dr. Hayward on 11/23/2022.  Estimated blood loss 400 ml.  Laparotomy showed a massive mass arising from the left ovary, retroperitoneal, infiltrating the retroperitoneal space and adherent to the sigmoid mesentery.  Within the retroperitoneum it was densely adherent to the iliac vessel on the left side and ureter.  Deliberate rupture of the mass was required in order to access the retroperitoneal attachment.  The mass was sent for frozen section and determined to be at least borderline tumor possibly low-grade serous tumor.  There was no other evidence of disease in the pelvis or upper abdomen.  Due to being densely adherent to the retroperitoneum and iliac vessels, it is unclear whether the entire tumor was removed or not and the pelvic sidewall biopsy was taken to determine whether the residual ovary was present and not a fibrotic reaction.  Bilateral ureterolysis was required.   Hypersensitivity reaction to Taxol 01/09/2023.   Adjuvant paclitaxel and carboplatin 01/10/2023 through 4/28/2023, 6 cycles.   Adjuvant letrozole on 6/2023 through present.     Ovarian cancer   12/15/2022 Initial Diagnosis    Ovarian cancer (HCC)     1/9/2023 -  Chemotherapy    OP OVARIAN PACLitaxel /  CARBOplatin (Q21D)     1/9/2023 -  Chemotherapy    OP CENTRAL VENOUS ACCESS DEVICE ACCESS, CARE, AND MAINTENANCE (CVAD)     1/20/2023 Cancer Staged    Staging form: Ovary, Fallopian Tube, And Primary Peritoneal Carcinoma, AJCC 8th Edition  - Pathologic stage from 1/20/2023: FIGO Stage III (pT3, pN1, cM0) - Signed by Walker Magana MD on 1/20/2023     2/15/2023 - 2/15/2023 Chemotherapy    OP MAGNESIUM          PAST MEDICAL HISTORY:  ALLERGIES:  No Known Allergies  CURRENT MEDICATIONS:  Outpatient Encounter Medications as of 4/30/2024   Medication Sig Dispense Refill    Calcium Carbonate-Vit D-Min (CALCIUM 1200 PO) Take 1 tablet by mouth Daily.      Eliquis 5 MG tablet tablet 1 tablet Every 12 (Twelve) Hours.      letrozole (FEMARA) 2.5 MG tablet Take 1 tablet by mouth Daily.      lidocaine-prilocaine (EMLA) 2.5-2.5 % cream APPLY TOPICALLY TO PORT SITE 30 MINUTES PRIOR TO BEING ACCESSED 30 g 0    lisinopril-hydrochlorothiazide (PRINZIDE,ZESTORETIC) 20-12.5 MG per tablet Take 1 tablet by mouth Daily.      vitamin D3 125 MCG (5000 UT) capsule capsule Take  by mouth Daily.       No facility-administered encounter medications on file as of 4/30/2024.     ADULT ILLNESSES:  Patient Active Problem List   Diagnosis Code    Ovarian cancer C56.9    Encounter for care related to vascular access port Z45.2     SURGERIES:  Past Surgical History:   Procedure Laterality Date    HYSTERECTOMY      TONSILLECTOMY      TUMOR REMOVAL Left 11/2022    VENA CAVA FILTER INSERTION      VENA CAVA FILTER REMOVAL N/A 11/17/2023    Procedure: ATTEMPTED VENA CAVA FILTER REMOVAL;  Surgeon: All Adorno DO;  Location: [facility] OR 12;  Service: Vascular;  Laterality: N/A;    VENOUS ACCESS DEVICE (PORT) INSERTION N/A 12/30/2022    Procedure: INSERTION VENOUS ACCESS DEVICE;  Surgeon: Holley Mcgill MD;  Location: [facility] OR;  Service: General;  Laterality: N/A;     HEALTH MAINTENANCE ITEMS:  Health Maintenance Due   Topic Date Due     "MAMMOGRAM  Never done    BMI FOLLOWUP  Never done    COLORECTAL CANCER SCREENING  Never done    TDAP/TD VACCINES (2 - Tdap) 02/24/2008    ZOSTER VACCINE (1 of 2) Never done    HEPATITIS C SCREENING  Never done    ANNUAL PHYSICAL  Never done    COVID-19 Vaccine (4 - 2023-24 season) 09/01/2023    RSV Vaccine - Adults (1 - 1-dose 60+ series) Never done       <no information>  Last Completed Colonoscopy       This patient has no relevant Health Maintenance data.          Immunization History   Administered Date(s) Administered    COVID-19 (MODERNA) 1st,2nd,3rd Dose Monovalent 07/29/2021, 08/26/2021    COVID-19 (MODERNA) Monovalent Original Booster 01/27/2022     Last Completed Mammogram       This patient has no relevant Health Maintenance data.              FAMILY HISTORY:  Family History   Problem Relation Age of Onset    Hypertension Mother     Multiple sclerosis Father     Heart attack Maternal Grandfather     Diabetes Paternal Grandmother     Heart attack Paternal Grandfather      SOCIAL HISTORY:  Social History     Socioeconomic History    Marital status:    Tobacco Use    Smoking status: Never    Smokeless tobacco: Never   Vaping Use    Vaping status: Never Used   Substance and Sexual Activity    Alcohol use: Never    Drug use: Never    Sexual activity: Defer       REVIEW OF SYSTEMS:    Review of Systems   Constitutional:  Negative for fatigue, fever and unexpected weight change.        \"I feel great.\"   HENT:  Negative for congestion and mouth sores.    Eyes:  Negative for discharge and redness.   Respiratory:  Negative for shortness of breath and wheezing.    Cardiovascular:  Negative for chest pain and palpitations.   Gastrointestinal:  Negative for constipation, diarrhea, nausea and vomiting.   Endocrine: Negative for cold intolerance and heat intolerance.   Genitourinary:  Negative for hematuria and vaginal bleeding.   Musculoskeletal:  Negative for gait problem and myalgias.   Skin:  Negative for " "pallor.   Allergic/Immunologic: Negative for food allergies.   Neurological:  Negative for dizziness, speech difficulty and weakness.        \"Toes still tingle. My fingers are fine. Just my thumb.\"   Hematological:  Negative for adenopathy. Does not bruise/bleed easily.   Psychiatric/Behavioral:  Negative for agitation and confusion. The patient is not nervous/anxious.        VITAL SIGNS: /64   Pulse 60   Temp 97.4 °F (36.3 °C)   Resp 18   Ht 144.8 cm (57\")   Wt 89.4 kg (197 lb)   SpO2 98%   Breastfeeding No   BMI 42.63 kg/m²   Pain Score    04/30/24 0806   PainSc: 0-No pain       PHYSICAL EXAMINATION:     Physical Exam  Vitals reviewed.   Constitutional:       General: She is not in acute distress.  HENT:      Head: Normocephalic and atraumatic.   Eyes:      General: No scleral icterus.  Cardiovascular:      Rate and Rhythm: Normal rate.   Pulmonary:      Effort: No respiratory distress.      Breath sounds: No wheezing.      Comments: Port, left. No erythema.  Abdominal:      General: Bowel sounds are normal.      Palpations: Abdomen is soft.      Tenderness: There is no abdominal tenderness.   Musculoskeletal:         General: No swelling.      Cervical back: Neck supple.   Skin:     General: Skin is warm.      Coloration: Skin is not pale.   Neurological:      Mental Status: She is alert and oriented to person, place, and time.   Psychiatric:         Mood and Affect: Mood normal.         Behavior: Behavior normal.         Thought Content: Thought content normal.         Judgment: Judgment normal.         LABS    Lab Results - Last 18 Months   Lab Units 04/16/24  0836 01/18/24  0933 11/14/23  1239 11/14/23  0857 09/27/23  0902 08/16/23  0802   HEMOGLOBIN g/dL 12.1 11.6* 12.0 12.1 11.7* 12.0   HEMATOCRIT % 36.1 34.8 36.4 37.0 36.4 37.0   MCV fL 86.6 87.0 88.6 88.5 89.4 91.1   WBC 10*3/mm3 8.25 7.96 7.65 7.74 7.41 7.00   RDW % 12.5 12.3 12.2* 12.1* 12.6 12.5   MPV fL 9.6 9.3 9.6 9.3 9.4 9.4   PLATELETS " "10*3/mm3 209 204 197 194 205 207   IMM GRAN % % 0.2 0.3 0.3 0.4 0.3 0.1   NEUTROS ABS 10*3/mm3 5.49 5.12 4.97 5.30 4.73 4.14   LYMPHS ABS 10*3/mm3 1.98 2.00 1.88 1.58 1.89 2.02   MONOS ABS 10*3/mm3 0.68 0.71 0.70 0.73 0.67 0.73   EOS ABS 10*3/mm3 0.04 0.06 0.04 0.05 0.06 0.07   BASOS ABS 10*3/mm3 0.04 0.05 0.04 0.05 0.04 0.03   IMMATURE GRANS (ABS) 10*3/mm3 0.02 0.02 0.02 0.03 0.02 0.01   NRBC /100 WBC 0.0 0.0 0.0 0.0 0.0 0.0       Lab Results - Last 18 Months   Lab Units 04/16/24  0836 01/18/24  0933 12/27/23  0921 11/14/23  1239 11/14/23  0857 09/27/23  0902 08/16/23  0802 06/21/23  0750   GLUCOSE mg/dL 95 100*  --  89 97 69 101* 78   SODIUM mmol/L 141 140  --  142 139 141 140 143   POTASSIUM mmol/L 4.0 3.9  --  3.9 4.4 3.8 4.1 4.2   CO2 mmol/L 26.0 26.0  --  30.0* 30.0* 27.0 28.0 28.0   CHLORIDE mmol/L 104 104  --  102 103 104 104 105   ANION GAP mmol/L 11.0 10.0  --  10.0 6.0 10.0 8.0 10.0   CREATININE mg/dL 1.46* 1.25* 1.40* 1.36* 1.27* 1.39* 1.50* 1.29*   BUN mg/dL 29* 23  --  23 22 20 24* 27*   BUN / CREAT RATIO  19.9 18.4  --  16.9 17.3 14.4 16.0 20.9   CALCIUM mg/dL 10.0 9.7  --  9.7 9.8 10.0 10.4 10.0   ALK PHOS U/L 93 78  --   --  75 69 71 66   TOTAL PROTEIN g/dL 7.1 6.9  --   --  7.1 6.8 7.2 7.1   ALT (SGPT) U/L 15 18  --   --  14 10 17 14   AST (SGOT) U/L 19 19  --   --  17 16 16 16   BILIRUBIN mg/dL 0.4 0.3  --   --  0.2 0.3 0.2 0.2   ALBUMIN g/dL 4.4 4.1  --   --  4.3 4.1 4.4 4.2   GLOBULIN gm/dL 2.7 2.8  --   --  2.8 2.7 2.8 2.9       No results for input(s): \"MSPIKE\", \"KAPPALAMB\", \"IGLFLC\", \"URICACID\", \"FREEKAPPAL\", \"CEA\", \"LDH\", \"REFLABREPO\" in the last 96928 hours.    Lab Results - Last 18 Months   Lab Units 06/21/23  0750 01/09/23  0845   IRON mcg/dL 76 35*   TIBC mcg/dL 325 308   IRON SATURATION (TSAT) % 23 11*   FERRITIN ng/mL 262.30* 379.80*       Tiff Tonio reports a pain score of 0.         ASSESSMENT:  1.  Ovarian cancer, low-grade serous carcinoma, left.  Tumor size 18 cm. Negative genetic " "abnormality.   AJCC stage:IIIA1 (pT3, pN1, cM0, G1)  Treatment status: Postadjuvant paclitaxel and carboplatin 01/10/2023 through 4/28/2023, 6 cycles.  She is taking adjuvant letrozole 2.5 mg 6/2023 through present by Dr. Haywrad.  2.  Performance status of 0.  3.  Left deep venous thrombosis from malignancy.  Patient is on apixaban.  4.  Pulmonary embolism from malignancy.  Undergoing treatment with apixaban.  Post IVC filter 11/22/2022.  Unable to remove IVC filter on multiple attempts on 11/17/2023.  5.  Anemia from iron deficiency and chronic kidney disease stage IIIa, GFR 59.3 ml/min on 2/3/2023. Oral iron 01/10/2023 through 6/21/2023.    6.  Hypersensitivity reaction to paclitaxel on 01/09/2023. Responded to dexamethasone as premed.   7.  Grade 1 neuropathy. Under observation.  She is off gabapentin.    8.  RIGHT lower lobe 3 mm pulmonary nodule. On observation.  9.  RIGHT thyroid with a 4 cm heterogeneous nodule.  Management per primary care.  10. Nodule located in the segment 4 a of the liver measures 7 mm on 5/12/2023, MRI abdomen on 6/12/2023 showed benign cyst.  11.  Osteopenia.  She is on calcium and D.                 PLAN:  1.   Re: Venous Doppler on 4/12/2024.There is evidence of chronic partial deep venous thrombosis of the left lower extremity.  2.   Re: Tolerance to letrozole. \"It's fine.\"  3.   Re: Heme status.  WBC 8.25, hemoglobin 12.1, hematocrit 36.1, MCV 86.6 and platelet 209.   4.   Re: CMP.  GFR 41 from 49.4 from 44.7 from 48.5 from 43.8 from 40 from 47.9 mL per minute   5.   Re:  at 33.6 from 32.3 from 35 from 38.9 from 43 from 33.    6.   Re: Thyroid ultrasound 12/29/2023.  Dominant masses in the left and right lobes of the thyroid which are hypervascular.  Small bilateral thyroid nodules which have a benign appearance.  Sent to PCP.  7.   Re:  Stable for observation, ovarian cancer.   8.  Schedule CT of the chest without IV contrast " "6/2024 to follow the 3 mm right lower lobe lung nodule.   9.  Flush port every 6 weeks until removed.   10.  eRx for ondansetron 8 mg p.o. every 8 hours as needed for nausea and vomiting #60, 2 refills if needed.  11.  eRx gabapentin 300 mg po three times daily, 90, 2 refills if needed.  Monitor for dizziness, hypotension or hypersomnolence.  12.  eRx letrozole 2.5 mg po daily, 90, 3 refills if needed.  Stop for bone loss or hot flashes.   13.  Continue ongoing management per primary care physician and other specialists.  14.  Plan of care discussed with patient and her spouse Dennis.  Understanding expressed.  She is agreeable to proceed.  15. Questions were answered to her satisfaction. \"Yes.\"  16.  She will be seen every 2 to 4 months for the first 2 years then every 3 to 6 months for the next 3 years.  Annually after 5 years.  Imaging as clinically indicated.  17.  Refer to surgery to remove port. \"I want it out.\"  18.  Return to office in 12 weeks with CBC with differential, CMP, , and pre office left leg venous doppler.   19.  Referral to nephrology per PCP.              I have reviewed the assessment and plan and verified the accuracy of it. No changes to assessment and plan since the information was documented. Walker Magana MD 04/30/24         I spent 31 total minutes, face-to-face, caring for Tiff today. Greater than 50% of this time involved counseling and/or coordination of care as documented within this note.                  MD Tyrone Galindo MD    "

## 2024-04-30 ENCOUNTER — OFFICE VISIT (OUTPATIENT)
Dept: ONCOLOGY | Facility: CLINIC | Age: 59
End: 2024-04-30
Payer: COMMERCIAL

## 2024-04-30 VITALS
TEMPERATURE: 97.4 F | OXYGEN SATURATION: 98 % | HEIGHT: 57 IN | DIASTOLIC BLOOD PRESSURE: 64 MMHG | RESPIRATION RATE: 18 BRPM | HEART RATE: 60 BPM | WEIGHT: 197 LBS | SYSTOLIC BLOOD PRESSURE: 122 MMHG | BODY MASS INDEX: 42.5 KG/M2

## 2024-04-30 DIAGNOSIS — C56.2 MALIGNANT NEOPLASM OF LEFT OVARY: Primary | ICD-10-CM

## 2024-04-30 PROCEDURE — 99214 OFFICE O/P EST MOD 30 MIN: CPT | Performed by: INTERNAL MEDICINE

## 2024-04-30 NOTE — LETTER
April 30, 2024       No Recipients    Patient: Tiff Elizalde   YOB: 1965   Date of Visit: 4/30/2024     Dear Tyrone Garza MD:       Thank you for referring Tiff Elizalde to me for evaluation. Below are the relevant portions of my assessment and plan of care.    If you have questions, please do not hesitate to call me. I look forward to following Tiff along with you.         Sincerely,        Walker Magana MD        CC:   No Recipients    Walker Magana MD  04/30/24 0825  Sign when Signing Visit  [facility] HEMATOLOGY & ONCOLOGY  [address]  [facility] [id number] [phone]    Patient Name: Tiff Elizalde  Encounter Date: 04/30/2024  YOB: 1965  Patient Number: [id number]      REASON FOR FOLLOW-UP: Tiff Elizalde is a pleasant 58 y.o.  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 She is seen 12 months post 6 cycles of adjuvant paclitaxel and carboplatin.  She had hypersensitivity reaction to paclitaxel on C1D1.  The patient is on adjuvant letrozole since 6/2023 through present. She is seen with her spouse, Dennis. History is obtained from patient.  The patient is a reliable historian.      Oncology/Hematology History Overview Note   DIAGNOSTIC ABNORMALITIES:   Pain lower back and left leg swelling.  She presented to her PCP.   CT scan done at [facility]. Details not available.  Patient seen by Dr. Virgilio Hayward 11/23/2022. Patient found to have a large pelvic mass causing bilateral hydronephrosis with resultant nonfunctioning left kidney, elevated creatinine, left deep venous thrombosis and pulmonary embolus.  Pathology report 11/28/2022.  Fallopian tube and ovary, left salpingo-oophorectomy: Fallopian tube with no evidence of involvement by malignancy.  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  No surface  involvement demonstrated.  Uterus, fallopian tube and ovary, hysterectomy with right salpingo-oophorectomy: Cervix and endocervix with no evidence of squamous or glandular dysplasia.  Chronic cervicitis present.  Weakly proliferative endometrium, negative for hyperplasia, carcinoma, and endometritis.  Benign endometrial polyp present.  Adenomyosis.  No leiomyomata.  Right fallopian tube with focal involvement by serous neoplasm with Samona by this most consistent with low-grade serous carcinoma.  Remnant left ovary with adhesions to uterine serosa and focal involvement by low-grade serous carcinoma.  Right ovary with surface involvement by low-grade serous carcinoma.  Omentum biopsy: Involved by low-grade serous carcinoma, invasive).  Sidewall, left pelvic biopsy: Edema with hemorrhage and chronic inflammation, negative for endometriosis and malignancy.  Lymph node left pelvic biopsy: 1 lymph node with a 0.9 mm focus of metastatic serous carcinoma surrounding adipose tissue with hemorrhage and edema.           PREVIOUS INTERVENTIONS:  IVC filter was placed 11/22/2022 at [place].  She had undergone exploratory laparotomy, total abdominal hysterectomy, bilateral salpingo-oophorectomy, debulking, left pelvic lymph node dissection and omentectomy by Dr. Hayward on 11/23/2022.  Estimated blood loss 400 ml.  Laparotomy showed a massive mass arising from the left ovary, retroperitoneal, infiltrating the retroperitoneal space and adherent to the sigmoid mesentery.  Within the retroperitoneum it was densely adherent to the iliac vessel on the left side and ureter.  Deliberate rupture of the mass was required in order to access the retroperitoneal attachment.  The mass was sent for frozen section and determined to be at least borderline tumor possibly low-grade serous tumor.  There was no other evidence of disease in the pelvis or upper abdomen.  Due to being densely adherent to the retroperitoneum and iliac vessels, it is  unclear whether the entire tumor was removed or not and the pelvic sidewall biopsy was taken to determine whether the residual ovary was present and not a fibrotic reaction.  Bilateral ureterolysis was required.   Hypersensitivity reaction to Taxol 01/09/2023.   Adjuvant paclitaxel and carboplatin 01/10/2023 through 4/28/2023, 6 cycles.   Adjuvant letrozole on 6/2023 through present.     Ovarian cancer   12/15/2022 Initial Diagnosis    Ovarian cancer (HCC)     1/9/2023 -  Chemotherapy    OP OVARIAN PACLitaxel / CARBOplatin (Q21D)     1/9/2023 -  Chemotherapy    OP CENTRAL VENOUS ACCESS DEVICE ACCESS, CARE, AND MAINTENANCE (CVAD)     1/20/2023 Cancer Staged    Staging form: Ovary, Fallopian Tube, And Primary Peritoneal Carcinoma, AJCC 8th Edition  - Pathologic stage from 1/20/2023: FIGO Stage III (pT3, pN1, cM0) - Signed by Walker Magana MD on 1/20/2023     2/15/2023 - 2/15/2023 Chemotherapy    OP MAGNESIUM          PAST MEDICAL HISTORY:  ALLERGIES:  No Known Allergies  CURRENT MEDICATIONS:  Outpatient Encounter Medications as of 4/30/2024   Medication Sig Dispense Refill   • Calcium Carbonate-Vit D-Min (CALCIUM 1200 PO) Take 1 tablet by mouth Daily.     • Eliquis 5 MG tablet tablet 1 tablet Every 12 (Twelve) Hours.     • letrozole (FEMARA) 2.5 MG tablet Take 1 tablet by mouth Daily.     • lidocaine-prilocaine (EMLA) 2.5-2.5 % cream APPLY TOPICALLY TO PORT SITE 30 MINUTES PRIOR TO BEING ACCESSED 30 g 0   • lisinopril-hydrochlorothiazide (PRINZIDE,ZESTORETIC) 20-12.5 MG per tablet Take 1 tablet by mouth Daily.     • vitamin D3 125 MCG (5000 UT) capsule capsule Take  by mouth Daily.       No facility-administered encounter medications on file as of 4/30/2024.     ADULT ILLNESSES:  Patient Active Problem List   Diagnosis Code   • Ovarian cancer C56.9   • Encounter for care related to vascular access port Z45.2     SURGERIES:  Past Surgical History:   Procedure Laterality Date   • HYSTERECTOMY     • TONSILLECTOMY     •  TUMOR REMOVAL Left 11/2022   • VENA CAVA FILTER INSERTION     • VENA CAVA FILTER REMOVAL N/A 11/17/2023    Procedure: ATTEMPTED VENA CAVA FILTER REMOVAL;  Surgeon: All Adorno DO;  Location: [facility] HYBRID OR 12;  Service: Vascular;  Laterality: N/A;   • VENOUS ACCESS DEVICE (PORT) INSERTION N/A 12/30/2022    Procedure: INSERTION VENOUS ACCESS DEVICE;  Surgeon: Holley Mcgill MD;  Location: [facility] OR;  Service: General;  Laterality: N/A;     HEALTH MAINTENANCE ITEMS:  Health Maintenance Due   Topic Date Due   • MAMMOGRAM  Never done   • BMI FOLLOWUP  Never done   • COLORECTAL CANCER SCREENING  Never done   • TDAP/TD VACCINES (2 - Tdap) 02/24/2008   • ZOSTER VACCINE (1 of 2) Never done   • HEPATITIS C SCREENING  Never done   • ANNUAL PHYSICAL  Never done   • COVID-19 Vaccine (4 - 2023-24 season) 09/01/2023   • RSV Vaccine - Adults (1 - 1-dose 60+ series) Never done       <no information>  Last Completed Colonoscopy       This patient has no relevant Health Maintenance data.          Immunization History   Administered Date(s) Administered   • COVID-19 (MODERNA) 1st,2nd,3rd Dose Monovalent 07/29/2021, 08/26/2021   • COVID-19 (MODERNA) Monovalent Original Booster 01/27/2022     Last Completed Mammogram       This patient has no relevant Health Maintenance data.              FAMILY HISTORY:  Family History   Problem Relation Age of Onset   • Hypertension Mother    • Multiple sclerosis Father    • Heart attack Maternal Grandfather    • Diabetes Paternal Grandmother    • Heart attack Paternal Grandfather      SOCIAL HISTORY:  Social History     Socioeconomic History   • Marital status:    Tobacco Use   • Smoking status: Never   • Smokeless tobacco: Never   Vaping Use   • Vaping status: Never Used   Substance and Sexual Activity   • Alcohol use: Never   • Drug use: Never   • Sexual activity: Defer       REVIEW OF SYSTEMS:    Review of Systems   Constitutional:  Negative for fatigue, fever and unexpected  "weight change.        \"I feel great.\"   HENT:  Negative for congestion and mouth sores.    Eyes:  Negative for discharge and redness.   Respiratory:  Negative for shortness of breath and wheezing.    Cardiovascular:  Negative for chest pain and palpitations.   Gastrointestinal:  Negative for constipation, diarrhea, nausea and vomiting.   Endocrine: Negative for cold intolerance and heat intolerance.   Genitourinary:  Negative for hematuria and vaginal bleeding.   Musculoskeletal:  Negative for gait problem and myalgias.   Skin:  Negative for pallor.   Allergic/Immunologic: Negative for food allergies.   Neurological:  Negative for dizziness, speech difficulty and weakness.        \"Toes still tingle. My fingers are fine. Just my thumb.\"   Hematological:  Negative for adenopathy. Does not bruise/bleed easily.   Psychiatric/Behavioral:  Negative for agitation and confusion. The patient is not nervous/anxious.        VITAL SIGNS: /64   Pulse 60   Temp 97.4 °F (36.3 °C)   Resp 18   Ht 144.8 cm (57\")   Wt 89.4 kg (197 lb)   SpO2 98%   Breastfeeding No   BMI 42.63 kg/m²   Pain Score    04/30/24 0806   PainSc: 0-No pain       PHYSICAL EXAMINATION:     Physical Exam  Vitals reviewed.   Constitutional:       General: She is not in acute distress.  HENT:      Head: Normocephalic and atraumatic.   Eyes:      General: No scleral icterus.  Cardiovascular:      Rate and Rhythm: Normal rate.   Pulmonary:      Effort: No respiratory distress.      Breath sounds: No wheezing.      Comments: Port, left. No erythema.  Abdominal:      General: Bowel sounds are normal.      Palpations: Abdomen is soft.      Tenderness: There is no abdominal tenderness.   Musculoskeletal:         General: No swelling.      Cervical back: Neck supple.   Skin:     General: Skin is warm.      Coloration: Skin is not pale.   Neurological:      Mental Status: She is alert and oriented to person, place, and time.   Psychiatric:         Mood and " Affect: Mood normal.         Behavior: Behavior normal.         Thought Content: Thought content normal.         Judgment: Judgment normal.         LABS    Lab Results - Last 18 Months   Lab Units 04/16/24  0836 01/18/24  0933 11/14/23  1239 11/14/23  0857 09/27/23  0902 08/16/23  0802   HEMOGLOBIN g/dL 12.1 11.6* 12.0 12.1 11.7* 12.0   HEMATOCRIT % 36.1 34.8 36.4 37.0 36.4 37.0   MCV fL 86.6 87.0 88.6 88.5 89.4 91.1   WBC 10*3/mm3 8.25 7.96 7.65 7.74 7.41 7.00   RDW % 12.5 12.3 12.2* 12.1* 12.6 12.5   MPV fL 9.6 9.3 9.6 9.3 9.4 9.4   PLATELETS 10*3/mm3 209 204 197 194 205 207   IMM GRAN % % 0.2 0.3 0.3 0.4 0.3 0.1   NEUTROS ABS 10*3/mm3 5.49 5.12 4.97 5.30 4.73 4.14   LYMPHS ABS 10*3/mm3 1.98 2.00 1.88 1.58 1.89 2.02   MONOS ABS 10*3/mm3 0.68 0.71 0.70 0.73 0.67 0.73   EOS ABS 10*3/mm3 0.04 0.06 0.04 0.05 0.06 0.07   BASOS ABS 10*3/mm3 0.04 0.05 0.04 0.05 0.04 0.03   IMMATURE GRANS (ABS) 10*3/mm3 0.02 0.02 0.02 0.03 0.02 0.01   NRBC /100 WBC 0.0 0.0 0.0 0.0 0.0 0.0       Lab Results - Last 18 Months   Lab Units 04/16/24  0836 01/18/24  0933 12/27/23  0921 11/14/23  1239 11/14/23  0857 09/27/23  0902 08/16/23  0802 06/21/23  0750   GLUCOSE mg/dL 95 100*  --  89 97 69 101* 78   SODIUM mmol/L 141 140  --  142 139 141 140 143   POTASSIUM mmol/L 4.0 3.9  --  3.9 4.4 3.8 4.1 4.2   CO2 mmol/L 26.0 26.0  --  30.0* 30.0* 27.0 28.0 28.0   CHLORIDE mmol/L 104 104  --  102 103 104 104 105   ANION GAP mmol/L 11.0 10.0  --  10.0 6.0 10.0 8.0 10.0   CREATININE mg/dL 1.46* 1.25* 1.40* 1.36* 1.27* 1.39* 1.50* 1.29*   BUN mg/dL 29* 23  --  23 22 20 24* 27*   BUN / CREAT RATIO  19.9 18.4  --  16.9 17.3 14.4 16.0 20.9   CALCIUM mg/dL 10.0 9.7  --  9.7 9.8 10.0 10.4 10.0   ALK PHOS U/L 93 78  --   --  75 69 71 66   TOTAL PROTEIN g/dL 7.1 6.9  --   --  7.1 6.8 7.2 7.1   ALT (SGPT) U/L 15 18  --   --  14 10 17 14   AST (SGOT) U/L 19 19  --   --  17 16 16 16   BILIRUBIN mg/dL 0.4 0.3  --   --  0.2 0.3 0.2 0.2   ALBUMIN g/dL 4.4 4.1  --    "--  4.3 4.1 4.4 4.2   GLOBULIN gm/dL 2.7 2.8  --   --  2.8 2.7 2.8 2.9       No results for input(s): \"MSPIKE\", \"KAPPALAMB\", \"IGLFLC\", \"URICACID\", \"FREEKAPPAL\", \"CEA\", \"LDH\", \"REFLABREPO\" in the last [id number] hours.    Lab Results - Last 18 Months   Lab Units 06/21/23  0750 01/09/23  0845   IRON mcg/dL 76 35*   TIBC mcg/dL 325 308   IRON SATURATION (TSAT) % 23 11*   FERRITIN ng/mL 262.30* 379.80*       Tiff Elizalde reports a pain score of 0.         ASSESSMENT:  1.  Ovarian cancer, low-grade serous carcinoma, left.  Tumor size 18 cm. Negative genetic abnormality.   AJCC stage:IIIA1 (pT3, pN1, cM0, G1)  Treatment status: Postadjuvant paclitaxel and carboplatin 01/10/2023 through 4/28/2023, 6 cycles.  She is taking adjuvant letrozole 2.5 mg 6/2023 through present by Dr. Hayward.  2.  Performance status of 0.  3.  Left deep venous thrombosis from malignancy.  Patient is on apixaban.  4.  Pulmonary embolism from malignancy.  Undergoing treatment with apixaban.  Post IVC filter 11/22/2022.  Unable to remove IVC filter on multiple attempts on 11/17/2023.  5.  Anemia from iron deficiency and chronic kidney disease stage IIIa, GFR 59.3 ml/min on 2/3/2023. Oral iron 01/10/2023 through 6/21/2023.    6.  Hypersensitivity reaction to paclitaxel on 01/09/2023. Responded to dexamethasone as premed.   7.  Grade 1 neuropathy. Under observation.  She is off gabapentin.    8.  RIGHT lower lobe 3 mm pulmonary nodule. On observation.  9.  RIGHT thyroid with a 4 cm heterogeneous nodule.  Management per primary care.  10. Nodule located in the segment 4 a of the liver measures 7 mm on 5/12/2023, MRI abdomen on 6/12/2023 showed benign cyst.  11.  Osteopenia.  She is on calcium and D.                 PLAN:  1.   Re: Venous Doppler on 4/12/2024.There is evidence of chronic partial deep venous thrombosis of the left lower extremity.  2.   Re: Tolerance to letrozole. \"It's fine.\"  3.   Re: Heme status.  WBC 8.25, hemoglobin " "12.1, hematocrit 36.1, MCV 86.6 and platelet 209.   4.   Re: CMP.  GFR 41 from 49.4 from 44.7 from 48.5 from 43.8 from 40 from 47.9 mL per minute   5.   Re:  at 33.6 from 32.3 from 35 from 38.9 from 43 from 33.    6.   Re: Thyroid ultrasound 12/29/2023.  Dominant masses in the left and right lobes of the thyroid which are hypervascular.  Small bilateral thyroid nodules which have a benign appearance.  Sent to PCP.  7.   Re:  Stable for observation, ovarian cancer.   8.  Schedule CT of the chest without IV contrast 6/2024 to follow the 3 mm right lower lobe lung nodule.   9.  Flush port every 6 weeks until removed.   10.  eRx for ondansetron 8 mg p.o. every 8 hours as needed for nausea and vomiting #60, 2 refills if needed.  11.  eRx gabapentin 300 mg po three times daily, 90, 2 refills if needed.  Monitor for dizziness, hypotension or hypersomnolence.  12.  eRx letrozole 2.5 mg po daily, 90, 3 refills if needed.  Stop for bone loss or hot flashes.   13.  Continue ongoing management per primary care physician and other specialists.  14.  Plan of care discussed with patient and her spouse Dennis.  Understanding expressed.  She is agreeable to proceed.  15. Questions were answered to her satisfaction. \"Yes.\"  16.  She will be seen every 2 to 4 months for the first 2 years then every 3 to 6 months for the next 3 years.  Annually after 5 years.  Imaging as clinically indicated.  17.  Refer to surgery to remove port. \"I want it out.\"  18.  Return to office in 12 weeks with CBC with differential, CMP, , and pre office left leg venous doppler.   19.  Referral to nephrology per PCP.              I have reviewed the assessment and plan and verified the accuracy of it. No changes to assessment and plan since the information was documented. Walker Magana MD 04/30/24         I spent 31 total minutes, face-to-face, caring for Tiff today. Greater than 50% of this time involved counseling " and/or coordination of care as documented within this note.                  MD Tyrone Galindo MD

## 2024-05-13 ENCOUNTER — OFFICE VISIT (OUTPATIENT)
Dept: SURGERY | Facility: CLINIC | Age: 59
End: 2024-05-13
Payer: COMMERCIAL

## 2024-05-13 VITALS
WEIGHT: 201 LBS | OXYGEN SATURATION: 97 % | HEIGHT: 57 IN | SYSTOLIC BLOOD PRESSURE: 148 MMHG | HEART RATE: 58 BPM | DIASTOLIC BLOOD PRESSURE: 83 MMHG | BODY MASS INDEX: 43.36 KG/M2

## 2024-05-13 DIAGNOSIS — Z95.828 PORT-A-CATH IN PLACE: Primary | ICD-10-CM

## 2024-05-13 DIAGNOSIS — E66.01 CLASS 3 SEVERE OBESITY WITH BODY MASS INDEX (BMI) OF 40.0 TO 44.9 IN ADULT, UNSPECIFIED OBESITY TYPE, UNSPECIFIED WHETHER SERIOUS COMORBIDITY PRESENT: ICD-10-CM

## 2024-05-13 PROCEDURE — 99214 OFFICE O/P EST MOD 30 MIN: CPT

## 2024-05-13 RX ORDER — HEPARIN SODIUM 5000 [USP'U]/ML
5000 INJECTION, SOLUTION INTRAVENOUS; SUBCUTANEOUS EVERY 12 HOURS SCHEDULED
OUTPATIENT
Start: 2024-05-13

## 2024-05-13 NOTE — PROGRESS NOTES
Office New Patient History and Physical:     Referring Provider: Walker Magana MD    Chief Complaint   Patient presents with    Follow-up     Follow up port removal        Subjective .     History of present illness:  Tiff Elizalde is a 58 y.o. female who presents to the clinic for discussion of port removal. She had this placed initially by Dr. Mcgill in December of 2022. She has had no issues with the port. Dr. Magana has given the patient the go ahead to have it removed. She does have an IVC filter present that they are unable to get out.     BMI is 43.48. She is a nonsmoker. She takes 5mg Eliquis BID. She has stopped this before without issue.     Review of Systems    Review of Systems - General ROS: negative  ENT ROS: negative  Respiratory ROS: no cough, shortness of breath, or wheezing  Cardiovascular ROS: no chest pain or dyspnea on exertion  Gastrointestinal ROS: no abdominal pain, change in bowel habits, or black or bloody stools  Genito-Urinary ROS: no dysuria, trouble voiding, or hematuria  Dermatological ROS: negative   Breast ROS: negative for breast lumps  Hematological and Lymphatic ROS: negative  Musculoskeletal ROS: negative   Neurological ROS: negative    Psychological ROS: negative  Endocrine ROS: negative    History  Past Medical History:   Diagnosis Date    Blood clot in vein     COVID     Goiter     History of transfusion     Hypertension     Leg fracture     Numbness of toes     SIDE EFFECT FROM CHEMO    Ovarian cancer 12/15/2022   ,   Past Surgical History:   Procedure Laterality Date    HYSTERECTOMY      TONSILLECTOMY      TUMOR REMOVAL Left 11/2022    VENA CAVA FILTER INSERTION      VENA CAVA FILTER REMOVAL N/A 11/17/2023    Procedure: ATTEMPTED VENA CAVA FILTER REMOVAL;  Surgeon: All Adorno DO;  Location: [place] [address];  Service: Vascular;  Laterality: N/A;    VENOUS ACCESS DEVICE (PORT) INSERTION N/A 12/30/2022    Procedure: INSERTION VENOUS ACCESS DEVICE;  Surgeon: Nano  "Holley SARABIA MD;  Location:  [facility] OR;  Service: General;  Laterality: N/A;   ,   Family History   Problem Relation Age of Onset    Hypertension Mother     Multiple sclerosis Father     Heart attack Maternal Grandfather     Diabetes Paternal Grandmother     Heart attack Paternal Grandfather    ,   Social History     Tobacco Use    Smoking status: Never    Smokeless tobacco: Never   Vaping Use    Vaping status: Never Used   Substance Use Topics    Alcohol use: Never    Drug use: Never   , (Not in a hospital admission)   and Allergies:  Patient has no known allergies.    Current Outpatient Medications:     Calcium Carbonate-Vit D-Min (CALCIUM 1200 PO), Take 1 tablet by mouth Daily., Disp: , Rfl:     Eliquis 5 MG tablet tablet, 1 tablet Every 12 (Twelve) Hours., Disp: , Rfl:     letrozole (FEMARA) 2.5 MG tablet, Take 1 tablet by mouth Daily., Disp: , Rfl:     lidocaine-prilocaine (EMLA) 2.5-2.5 % cream, APPLY TOPICALLY TO PORT SITE 30 MINUTES PRIOR TO BEING ACCESSED, Disp: 30 g, Rfl: 0    lisinopril-hydrochlorothiazide (PRINZIDE,ZESTORETIC) 20-12.5 MG per tablet, Take 1 tablet by mouth Daily., Disp: , Rfl:     vitamin D3 125 MCG (5000 UT) capsule capsule, Take  by mouth Daily., Disp: , Rfl:     Objective     Vital Signs   /83 (BP Location: Left arm, Patient Position: Sitting, Cuff Size: Adult)   Pulse 58   Ht 144.8 cm (57.01\")   Wt 91.2 kg (201 lb)   SpO2 97%   BMI 43.48 kg/m²      Physical Exam:  General appearance - alert, well appearing, and in no distress  Mental status - alert, oriented to person, place, and time, normal mood, behavior, speech, dress, motor activity, and thought processes  Eyes - sclera anicteric  Neck - supple, no significant adenopathy  Chest - no tachypnea, retractions or cyanosis, L subclavian port in place  Heart - normal rate and regular rhythm  Neurological - alert, oriented, normal speech, no focal findings or movement disorder noted  Extremities - no pedal edema noted  Skin - normal " coloration and turgor, no rashes, no suspicious skin lesions noted    Results Review:  Result Review :            Assessment & Plan       Diagnoses and all orders for this visit:    1. Port-A-Cath in place (Primary)  -     Case Request; Standing  -     MRSA Screen Culture (Outpatient) - Swab, Nares; Future  -     XR chest 1 vw; Future  -     ECG 12 Lead; Future  -     heparin (porcine) 5000 UNIT/ML injection 5,000 Units  -     ceFAZolin (ANCEF) 2,000 mg in sodium chloride 0.9 % 100 mL IVPB  -     Case Request    2. Class 3 severe obesity with body mass index (BMI) of 40.0 to 44.9 in adult, unspecified obesity type, unspecified whether serious comorbidity present    Other orders  -     Follow Anesthesia Guidelines / Protocol; Future  -     Follow Anesthesia Guidelines / Protocol; Standing  -     Verify / Perform Chlorhexidine Skin Prep; Standing  -     Verify / Perform Chlorhexidine Skin Prep if Indicated (If Not Already Completed); Standing  -     Obtain Informed Consent; Future  -     Provide NPO Instructions to Patient; Future  -     Chlorhexidine Skin Prep; Future  -     Notify physician (specify); Standing  -     Instructions on coughing, deep breathing, and incentive spirometry.; Standing  -     Oxygen Therapy-; Standing         Tiff Elizalde is a 58 y.o. female with a need for port removal. After a discussion of risks (including bleeding, damage to surrounding structures including the arteries) and benefits, the patient wishes to proceed with port removal. The patient is currently scheduled for this procedure on 5/21/24 at 1000, with arrival at 0730 that morning.     I also discussed with the patient post-operative pain management including multimodal pain control utilizing Tylenol, ibuprofen, and tramadol for breakthrough pain. I will plan to given the patient 5 tabs of 50mg Ultram post-operatively for break through pain.     This is a chronic problem. Pre-work has been ordered including CXR and EKG. The patient  is at risk of complications secondary to increased BMI.    Follow up:     Class 3 Severe Obesity (BMI >=40).     Return if symptoms worsen or fail to improve.        Armida Bateman PA-C  05/13/24  11:55 CDT

## 2024-05-13 NOTE — H&P (VIEW-ONLY)
Office New Patient History and Physical:     Referring Provider: Walker Magana MD    Chief Complaint   Patient presents with    Follow-up     Follow up port removal        Subjective .     History of present illness:  Tiff Elizalde is a 58 y.o. female who presents to the clinic for discussion of port removal. She had this placed initially by Dr. Mcgill in December of 2022. She has had no issues with the port. Dr. Magana has given the patient the go ahead to have it removed. She does have an IVC filter present that they are unable to get out.     BMI is 43.48. She is a nonsmoker. She takes 5mg Eliquis BID. She has stopped this before without issue.     Review of Systems    Review of Systems - General ROS: negative  ENT ROS: negative  Respiratory ROS: no cough, shortness of breath, or wheezing  Cardiovascular ROS: no chest pain or dyspnea on exertion  Gastrointestinal ROS: no abdominal pain, change in bowel habits, or black or bloody stools  Genito-Urinary ROS: no dysuria, trouble voiding, or hematuria  Dermatological ROS: negative   Breast ROS: negative for breast lumps  Hematological and Lymphatic ROS: negative  Musculoskeletal ROS: negative   Neurological ROS: negative    Psychological ROS: negative  Endocrine ROS: negative    History  Past Medical History:   Diagnosis Date    Blood clot in vein     COVID     Goiter     History of transfusion     Hypertension     Leg fracture     Numbness of toes     SIDE EFFECT FROM CHEMO    Ovarian cancer 12/15/2022   ,   Past Surgical History:   Procedure Laterality Date    HYSTERECTOMY      TONSILLECTOMY      TUMOR REMOVAL Left 11/2022    VENA CAVA FILTER INSERTION      VENA CAVA FILTER REMOVAL N/A 11/17/2023    Procedure: ATTEMPTED VENA CAVA FILTER REMOVAL;  Surgeon: All Adorno DO;  Location: [place] [address];  Service: Vascular;  Laterality: N/A;    VENOUS ACCESS DEVICE (PORT) INSERTION N/A 12/30/2022    Procedure: INSERTION VENOUS ACCESS DEVICE;  Surgeon: Nano  "Holley SARABIA MD;  Location:  [facility];  Service: General;  Laterality: N/A;   ,   Family History   Problem Relation Age of Onset    Hypertension Mother     Multiple sclerosis Father     Heart attack Maternal Grandfather     Diabetes Paternal Grandmother     Heart attack Paternal Grandfather    ,   Social History     Tobacco Use    Smoking status: Never    Smokeless tobacco: Never   Vaping Use    Vaping status: Never Used   Substance Use Topics    Alcohol use: Never    Drug use: Never   , (Not in a hospital admission)   and Allergies:  Patient has no known allergies.    Current Outpatient Medications:     Calcium Carbonate-Vit D-Min (CALCIUM 1200 PO), Take 1 tablet by mouth Daily., Disp: , Rfl:     Eliquis 5 MG tablet tablet, 1 tablet Every 12 (Twelve) Hours., Disp: , Rfl:     letrozole (FEMARA) 2.5 MG tablet, Take 1 tablet by mouth Daily., Disp: , Rfl:     lidocaine-prilocaine (EMLA) 2.5-2.5 % cream, APPLY TOPICALLY TO PORT SITE 30 MINUTES PRIOR TO BEING ACCESSED, Disp: 30 g, Rfl: 0    lisinopril-hydrochlorothiazide (PRINZIDE,ZESTORETIC) 20-12.5 MG per tablet, Take 1 tablet by mouth Daily., Disp: , Rfl:     vitamin D3 125 MCG (5000 UT) capsule capsule, Take  by mouth Daily., Disp: , Rfl:     Objective     Vital Signs   /83 (BP Location: Left arm, Patient Position: Sitting, Cuff Size: Adult)   Pulse 58   Ht 144.8 cm (57.01\")   Wt 91.2 kg (201 lb)   SpO2 97%   BMI 43.48 kg/m²      Physical Exam:  General appearance - alert, well appearing, and in no distress  Mental status - alert, oriented to person, place, and time, normal mood, behavior, speech, dress, motor activity, and thought processes  Eyes - sclera anicteric  Neck - supple, no significant adenopathy  Chest - no tachypnea, retractions or cyanosis, L subclavian port in place  Heart - normal rate and regular rhythm  Neurological - alert, oriented, normal speech, no focal findings or movement disorder noted  Extremities - no pedal edema noted  Skin - normal " coloration and turgor, no rashes, no suspicious skin lesions noted    Results Review:  Result Review :            Assessment & Plan       Diagnoses and all orders for this visit:    1. Port-A-Cath in place (Primary)  -     Case Request; Standing  -     MRSA Screen Culture (Outpatient) - Swab, Nares; Future  -     XR chest 1 vw; Future  -     ECG 12 Lead; Future  -     heparin (porcine) 5000 UNIT/ML injection 5,000 Units  -     ceFAZolin (ANCEF) 2,000 mg in sodium chloride 0.9 % 100 mL IVPB  -     Case Request    2. Class 3 severe obesity with body mass index (BMI) of 40.0 to 44.9 in adult, unspecified obesity type, unspecified whether serious comorbidity present    Other orders  -     Follow Anesthesia Guidelines / Protocol; Future  -     Follow Anesthesia Guidelines / Protocol; Standing  -     Verify / Perform Chlorhexidine Skin Prep; Standing  -     Verify / Perform Chlorhexidine Skin Prep if Indicated (If Not Already Completed); Standing  -     Obtain Informed Consent; Future  -     Provide NPO Instructions to Patient; Future  -     Chlorhexidine Skin Prep; Future  -     Notify physician (specify); Standing  -     Instructions on coughing, deep breathing, and incentive spirometry.; Standing  -     Oxygen Therapy-; Standing         Tiff Elizalde is a 58 y.o. female with a need for port removal. After a discussion of risks (including bleeding, damage to surrounding structures including the arteries) and benefits, the patient wishes to proceed with port removal. The patient is currently scheduled for this procedure on 5/21/24 at 1000, with arrival at 0730 that morning.     I also discussed with the patient post-operative pain management including multimodal pain control utilizing Tylenol, ibuprofen, and tramadol for breakthrough pain. I will plan to given the patient 5 tabs of 50mg Ultram post-operatively for break through pain.     This is a chronic problem. Pre-work has been ordered including CXR and EKG. The patient  is at risk of complications secondary to increased BMI.    Follow up:     Class 3 Severe Obesity (BMI >=40).     Return if symptoms worsen or fail to improve.        Armida Bateman PA-C  05/13/24  11:55 CDT

## 2024-05-16 ENCOUNTER — HOSPITAL ENCOUNTER (OUTPATIENT)
Dept: GENERAL RADIOLOGY | Facility: HOSPITAL | Age: 59
Discharge: HOME | End: 2024-05-16
Payer: COMMERCIAL

## 2024-05-16 ENCOUNTER — PRE-ADMISSION TESTING (OUTPATIENT)
Dept: PREADMISSION TESTING | Facility: HOSPITAL | Age: 59
End: 2024-05-16
Payer: COMMERCIAL

## 2024-05-16 VITALS
OXYGEN SATURATION: 98 % | DIASTOLIC BLOOD PRESSURE: 63 MMHG | HEART RATE: 100 BPM | RESPIRATION RATE: 16 BRPM | HEIGHT: 67 IN | WEIGHT: 203.04 LBS | SYSTOLIC BLOOD PRESSURE: 149 MMHG | BODY MASS INDEX: 31.87 KG/M2

## 2024-05-16 DIAGNOSIS — Z95.828 PORT-A-CATH IN PLACE: ICD-10-CM

## 2024-05-16 LAB
ANION GAP SERPL CALCULATED.3IONS-SCNC: 9 MMOL/L (ref 5–15)
BUN SERPL-MCNC: 17 MG/DL (ref 8–23)
BUN/CREAT SERPL: 13.6 (ref 7–25)
CALCIUM SPEC-SCNC: 10.2 MG/DL (ref 8.6–10.5)
CHLORIDE SERPL-SCNC: 101 MMOL/L (ref 98–107)
CO2 SERPL-SCNC: 30 MMOL/L (ref 22–29)
CREAT SERPL-MCNC: 1.25 MG/DL (ref 0.57–1)
DEPRECATED RDW RBC AUTO: 39.2 FL (ref 37–54)
EGFRCR SERPLBLD CKD-EPI 2021: 49.4 ML/MIN/1.73
ERYTHROCYTE [DISTWIDTH] IN BLOOD BY AUTOMATED COUNT: 12.4 % (ref 12.3–15.4)
GLUCOSE SERPL-MCNC: 129 MG/DL (ref 65–99)
HCT VFR BLD AUTO: 36.1 % (ref 34–46.6)
HGB BLD-MCNC: 12.3 G/DL (ref 12–15.9)
MCH RBC QN AUTO: 29.4 PG (ref 26.6–33)
MCHC RBC AUTO-ENTMCNC: 34.1 G/DL (ref 31.5–35.7)
MCV RBC AUTO: 86.4 FL (ref 79–97)
PLATELET # BLD AUTO: 227 10*3/MM3 (ref 140–450)
PMV BLD AUTO: 9.5 FL (ref 6–12)
POTASSIUM SERPL-SCNC: 4.2 MMOL/L (ref 3.5–5.2)
QT INTERVAL: 380 MS
QTC INTERVAL: 430 MS
RBC # BLD AUTO: 4.18 10*6/MM3 (ref 3.77–5.28)
SODIUM SERPL-SCNC: 140 MMOL/L (ref 136–145)
WBC NRBC COR # BLD AUTO: 9.58 10*3/MM3 (ref 3.4–10.8)

## 2024-05-16 PROCEDURE — 87081 CULTURE SCREEN ONLY: CPT

## 2024-05-16 PROCEDURE — 93005 ELECTROCARDIOGRAM TRACING: CPT

## 2024-05-16 PROCEDURE — 85027 COMPLETE CBC AUTOMATED: CPT

## 2024-05-16 PROCEDURE — 36415 COLL VENOUS BLD VENIPUNCTURE: CPT

## 2024-05-16 PROCEDURE — 80048 BASIC METABOLIC PNL TOTAL CA: CPT

## 2024-05-16 PROCEDURE — 71045 X-RAY EXAM CHEST 1 VIEW: CPT

## 2024-05-16 NOTE — DISCHARGE INSTRUCTIONS
Preparing for Surgery  Follow these instructions before the procedure:  Several days or weeks before your procedure      Ask your health care provider about:  Changing or stopping your regular medicines. This is especially important if you are taking diabetes medicines or blood thinners.  Taking medicines such as aspirin and ibuprofen. These medicines can thin your blood. Do not take these medicines unless your health care provider tells you to take them.  Taking over-the-counter medicines, vitamins, herbs, and supplements.    Contact your surgeon if you:  Develop a fever of more than 100.4°F (38°C) or other feelings of illness during the 48 hours before your surgery.  Have symptoms that get worse.  Have questions or concerns about your surgery.  If you are going home the same day of your surgery you will need to arrange for a responsible adult, age 18 years old or older, to drive you home from the hospital and stay with you for 24 hours. Verification of the  will be made prior to any procedure requiring sedation. You may not go home in a taxi or any form of public transportation by yourself.     Day before your procedure  Medication(s) you need to stop the day before your surgery:LISINOPRIL    24 hours before your procedure DO NOT drink alcoholic beverages or smoke.  24 hours before your procedure STOP taking Erectile Dysfunction medication (i.e.,Cialis, Viagra)   You may be asked to shower with a germ-killing soap.  Day of your procedure   You may take the following medication(s) the morning of surgery with a sip of water: AS DIRECTED BY YOUR PHYSICIAN      8 hours before your procedure STOP all food, any dairy products, and full liquids. This includes hard candy, chewing gum or mints. This is extremely important to prevent serious complications.     Up to 2 hours before your scheduled arrival time, you may have clear liquids no cream, powder, or pulp of any kind. Safe options are water, black coffee, plain  tea, soda, Gatorade/Powerade, clear broth, apple juice.    2 hours before your scheduled arrival time, STOP drinking clear liquids.    You may need to take another shower with a germ-killing soap before you leave home in the morning. Do not use perfumes, colognes, or body lotions.  Wear comfortable loose-fitting clothing.  Remove all jewelry including body piercing and rings, dark colored nail polish, and make up prior to arrival at the hospital. Leave all valuables at home.   Bring your hearing aids if you rely on them.  Do not wear contact lenses. If you wear eyeglasses remember to bring a case to store them in while you are in surgery.  Do not use denture adhesives since you will be asked to remove them during your surgery.    You do not need to bring your home medications into the hospital.   Bring your sleep apnea device with you on the day of your surgery (if this applies to you).  If you wear portable oxygen, bring it with you.   If you are staying overnight, you may bring a bag of items you may need such as slippers, robe and a change of clothes for your discharge. You may want to leave these items in the car until you are ready for them since your family will take your belongings when you leave the pre-operative area.  Arrive at the hospital as scheduled by the office. You will be asked to arrive 2 hours prior to your surgery time in order to prepare for your procedure.  When you arrive at the hospital  Go to the registration desk located at the main entrance of the hospital.  After registration is completed, you will be given a beeper and a sticker sheet. Take the stickers to Outpatient Surgery and place in the tray at the end of the desk to notify the staff that you have arrived and registered.   Return to the lobby to wait. You are not always called back according to the time of arrival but rather the time your doctor will be ready.  When your beeper lights up and vibrates proceed through the double  doors, under the stairs, and a member of the Outpatient Surgery staff will escort you to your preoperative room.   How to Use Chlorhexidine Before Surgery  Chlorhexidine gluconate (CHG) is a germ-killing (antiseptic) solution that is used to clean the skin. It can get rid of the bacteria that normally live on the skin and can keep them away for about 24 hours. To clean your skin with CHG, you may be given:  A CHG solution to use in the shower or as part of a sponge bath.  A prepackaged cloth that contains CHG.  Cleaning your skin with CHG may help lower the risk for infection:  While you are staying in the intensive care unit of the hospital.  If you have a vascular access, such as a central line, to provide short-term or long-term access to your veins.  If you have a catheter to drain urine from your bladder.  If you are on a ventilator. A ventilator is a machine that helps you breathe by moving air in and out of your lungs.  After surgery.  What are the risks?  Risks of using CHG include:  A skin reaction.  Hearing loss, if CHG gets in your ears and you have a perforated eardrum.  Eye injury, if CHG gets in your eyes and is not rinsed out.  The CHG product catching fire.  Make sure that you avoid smoking and flames after applying CHG to your skin.  Do not use CHG:  If you have a chlorhexidine allergy or have previously reacted to chlorhexidine.  On babies younger than 2 months of age.  How to use CHG solution  Use CHG only as told by your health care provider, and follow the instructions on the label.  Use the full amount of CHG as directed. Usually, this is one bottle.  During a shower    Follow these steps when using CHG solution during a shower (unless your health care provider gives you different instructions):  Start the shower.  Use your normal soap and shampoo to wash your face and hair.  Turn off the shower or move out of the shower stream.  Pour the CHG onto a clean washcloth. Do not use any type of brush  or rough-edged sponge.  Starting at your neck, lather your body down to your toes. Make sure you follow these instructions:  If you will be having surgery, pay special attention to the part of your body where you will be having surgery. Scrub this area for at least 1 minute.  Do not use CHG on your head or face. If the solution gets into your ears or eyes, rinse them well with water.  Avoid your genital area.  Avoid any areas of skin that have broken skin, cuts, or scrapes.  Scrub your back and under your arms. Make sure to wash skin folds.  Let the lather sit on your skin for 1-2 minutes or as long as told by your health care provider.  Thoroughly rinse your entire body in the shower. Make sure that all body creases and crevices are rinsed well.  Dry off with a clean towel. Do not put any substances on your body afterward--such as powder, lotion, or perfume--unless you are told to do so by your health care provider. Only use lotions that are recommended by the .  Put on clean clothes or pajamas.  If it is the night before your surgery, sleep in clean sheets.     During a sponge bath  Follow these steps when using CHG solution during a sponge bath (unless your health care provider gives you different instructions):  Use your normal soap and shampoo to wash your face and hair.  Pour the CHG onto a clean washcloth.  Starting at your neck, lather your body down to your toes. Make sure you follow these instructions:  If you will be having surgery, pay special attention to the part of your body where you will be having surgery. Scrub this area for at least 1 minute.  Do not use CHG on your head or face. If the solution gets into your ears or eyes, rinse them well with water.  Avoid your genital area.  Avoid any areas of skin that have broken skin, cuts, or scrapes.  Scrub your back and under your arms. Make sure to wash skin folds.  Let the lather sit on your skin for 1-2 minutes or as long as told by your  health care provider.  Using a different clean, wet washcloth, thoroughly rinse your entire body. Make sure that all body creases and crevices are rinsed well.  Dry off with a clean towel. Do not put any substances on your body afterward--such as powder, lotion, or perfume--unless you are told to do so by your health care provider. Only use lotions that are recommended by the .  Put on clean clothes or pajamas.  If it is the night before your surgery, sleep in clean sheets.  How to use CHG prepackaged cloths  Only use CHG cloths as told by your health care provider, and follow the instructions on the label.  Use the CHG cloth on clean, dry skin.  Do not use the CHG cloth on your head or face unless your health care provider tells you to.  When washing with the CHG cloth:  Avoid your genital area.  Avoid any areas of skin that have broken skin, cuts, or scrapes.  Before surgery    Follow these steps when using a CHG cloth to clean before surgery (unless your health care provider gives you different instructions):  Using the CHG cloth, vigorously scrub the part of your body where you will be having surgery. Scrub using a back-and-forth motion for 3 minutes. The area on your body should be completely wet with CHG when you are done scrubbing.  Do not rinse. Discard the cloth and let the area air-dry. Do not put any substances on the area afterward, such as powder, lotion, or perfume.  Put on clean clothes or pajamas.  If it is the night before your surgery, sleep in clean sheets.     For general bathing  Follow these steps when using CHG cloths for general bathing (unless your health care provider gives you different instructions).  Use a separate CHG cloth for each area of your body. Make sure you wash between any folds of skin and between your fingers and toes. Wash your body in the following order, switching to a new cloth after each step:  The front of your neck, shoulders, and chest.  Both of your arms,  under your arms, and your hands.  Your stomach and groin area, avoiding the genitals.  Your right leg and foot.  Your left leg and foot.  The back of your neck, your back, and your buttocks.  Do not rinse. Discard the cloth and let the area air-dry. Do not put any substances on your body afterward--such as powder, lotion, or perfume--unless you are told to do so by your health care provider. Only use lotions that are recommended by the .  Put on clean clothes or pajamas.  Contact a health care provider if:  Your skin gets irritated after scrubbing.  You have questions about using your solution or cloth.  You swallow any chlorhexidine. Call your local poison control center (1-683.779.4505 in the [place]  Get help right away if:  Your eyes itch badly, or they become very red or swollen.  Your skin itches badly and is red or swollen.  Your hearing changes.  You have trouble seeing.  You have swelling or tingling in your mouth or throat.  You have trouble breathing.  These symptoms may represent a serious problem that is an emergency. Do not wait to see if the symptoms will go away. Get medical help right away. Call your local emergency services (079 in the [place] Do not drive yourself to the hospital.  Summary  Chlorhexidine gluconate (CHG) is a germ-killing (antiseptic) solution that is used to clean the skin. Cleaning your skin with CHG may help to lower your risk for infection.  You may be given CHG to use for bathing. It may be in a bottle or in a prepackaged cloth to use on your skin. Carefully follow your health care provider's instructions and the instructions on the product label.  Do not use CHG if you have a chlorhexidine allergy.  Contact your health care provider if your skin gets irritated after scrubbing.  This information is not intended to replace advice given to you by your health care provider. Make sure you discuss any questions you have with your health care provider.  Document Revised:  04/17/2023 Document Reviewed: 02/28/2022  Elsevier Patient Education © 2023 [employer]

## 2024-05-17 LAB — MRSA SPEC QL CULT: NORMAL

## 2024-05-21 ENCOUNTER — ANESTHESIA (OUTPATIENT)
Dept: PERIOP | Facility: HOSPITAL | Age: 59
End: 2024-05-21
Payer: COMMERCIAL

## 2024-05-21 ENCOUNTER — ANESTHESIA EVENT (OUTPATIENT)
Dept: PERIOP | Facility: HOSPITAL | Age: 59
End: 2024-05-21
Payer: COMMERCIAL

## 2024-05-21 ENCOUNTER — HOSPITAL ENCOUNTER (OUTPATIENT)
Facility: HOSPITAL | Age: 59
Setting detail: HOSPITAL OUTPATIENT SURGERY
Discharge: HOME | End: 2024-05-21
Attending: SURGERY | Admitting: SURGERY
Payer: COMMERCIAL

## 2024-05-21 VITALS
OXYGEN SATURATION: 97 % | DIASTOLIC BLOOD PRESSURE: 53 MMHG | TEMPERATURE: 97.3 F | HEART RATE: 65 BPM | RESPIRATION RATE: 14 BRPM | SYSTOLIC BLOOD PRESSURE: 94 MMHG

## 2024-05-21 DIAGNOSIS — Z95.828 PORT-A-CATH IN PLACE: ICD-10-CM

## 2024-05-21 PROBLEM — Z45.2 EXHAUSTED VASCULAR ACCESS: Status: RESOLVED | Noted: 2023-11-07 | Resolved: 2024-05-21

## 2024-05-21 LAB
LAB AP CASE REPORT: NORMAL
Lab: NORMAL
PATH REPORT.FINAL DX SPEC: NORMAL
PATH REPORT.GROSS SPEC: NORMAL

## 2024-05-21 PROCEDURE — 25010000002 CEFAZOLIN PER 500 MG

## 2024-05-21 PROCEDURE — 25010000002 HEPARIN (PORCINE) PER 1000 UNITS

## 2024-05-21 PROCEDURE — 88300 SURGICAL PATH GROSS: CPT | Performed by: SURGERY

## 2024-05-21 PROCEDURE — 25010000002 DROPERIDOL PER 5 MG: Performed by: NURSE ANESTHETIST, CERTIFIED REGISTERED

## 2024-05-21 PROCEDURE — 25810000003 LACTATED RINGERS PER 1000 ML: Performed by: ANESTHESIOLOGY

## 2024-05-21 PROCEDURE — 25810000003 LACTATED RINGERS PER 1000 ML: Performed by: SURGERY

## 2024-05-21 PROCEDURE — 25010000002 PROPOFOL 200 MG/20ML EMULSION: Performed by: NURSE ANESTHETIST, CERTIFIED REGISTERED

## 2024-05-21 PROCEDURE — 36590 REMOVAL TUNNELED CV CATH: CPT | Performed by: SURGERY

## 2024-05-21 RX ORDER — MIDAZOLAM HYDROCHLORIDE 1 MG/ML
1 INJECTION INTRAMUSCULAR; INTRAVENOUS
Status: DISCONTINUED | OUTPATIENT
Start: 2024-05-21 | End: 2024-05-21 | Stop reason: HOSPADM

## 2024-05-21 RX ORDER — SODIUM CHLORIDE 0.9 % (FLUSH) 0.9 %
3 SYRINGE (ML) INJECTION AS NEEDED
Status: DISCONTINUED | OUTPATIENT
Start: 2024-05-21 | End: 2024-05-21 | Stop reason: HOSPADM

## 2024-05-21 RX ORDER — SODIUM CHLORIDE, SODIUM LACTATE, POTASSIUM CHLORIDE, CALCIUM CHLORIDE 600; 310; 30; 20 MG/100ML; MG/100ML; MG/100ML; MG/100ML
1000 INJECTION, SOLUTION INTRAVENOUS CONTINUOUS
Status: DISCONTINUED | OUTPATIENT
Start: 2024-05-21 | End: 2024-05-21 | Stop reason: HOSPADM

## 2024-05-21 RX ORDER — FENTANYL CITRATE 50 UG/ML
50 INJECTION, SOLUTION INTRAMUSCULAR; INTRAVENOUS
Status: DISCONTINUED | OUTPATIENT
Start: 2024-05-21 | End: 2024-05-21 | Stop reason: HOSPADM

## 2024-05-21 RX ORDER — DROPERIDOL 2.5 MG/ML
INJECTION, SOLUTION INTRAMUSCULAR; INTRAVENOUS AS NEEDED
Status: DISCONTINUED | OUTPATIENT
Start: 2024-05-21 | End: 2024-05-21 | Stop reason: SURG

## 2024-05-21 RX ORDER — SODIUM CHLORIDE 0.9 % (FLUSH) 0.9 %
3-10 SYRINGE (ML) INJECTION AS NEEDED
Status: DISCONTINUED | OUTPATIENT
Start: 2024-05-21 | End: 2024-05-21 | Stop reason: HOSPADM

## 2024-05-21 RX ORDER — FLUMAZENIL 0.1 MG/ML
0.2 INJECTION INTRAVENOUS AS NEEDED
Status: DISCONTINUED | OUTPATIENT
Start: 2024-05-21 | End: 2024-05-21 | Stop reason: HOSPADM

## 2024-05-21 RX ORDER — HYDROCODONE BITARTRATE AND ACETAMINOPHEN 5; 325 MG/1; MG/1
1 TABLET ORAL EVERY 4 HOURS PRN
Status: DISCONTINUED | OUTPATIENT
Start: 2024-05-21 | End: 2024-05-21 | Stop reason: HOSPADM

## 2024-05-21 RX ORDER — LIDOCAINE HYDROCHLORIDE AND EPINEPHRINE 10; 10 MG/ML; UG/ML
INJECTION, SOLUTION INFILTRATION; PERINEURAL AS NEEDED
Status: DISCONTINUED | OUTPATIENT
Start: 2024-05-21 | End: 2024-05-21 | Stop reason: HOSPADM

## 2024-05-21 RX ORDER — MAGNESIUM HYDROXIDE 1200 MG/15ML
LIQUID ORAL AS NEEDED
Status: DISCONTINUED | OUTPATIENT
Start: 2024-05-21 | End: 2024-05-21 | Stop reason: HOSPADM

## 2024-05-21 RX ORDER — HEPARIN SODIUM 5000 [USP'U]/ML
5000 INJECTION, SOLUTION INTRAVENOUS; SUBCUTANEOUS EVERY 12 HOURS SCHEDULED
Status: DISCONTINUED | OUTPATIENT
Start: 2024-05-21 | End: 2024-05-21 | Stop reason: HOSPADM

## 2024-05-21 RX ORDER — SODIUM CHLORIDE 9 MG/ML
40 INJECTION, SOLUTION INTRAVENOUS AS NEEDED
Status: DISCONTINUED | OUTPATIENT
Start: 2024-05-21 | End: 2024-05-21 | Stop reason: HOSPADM

## 2024-05-21 RX ORDER — DEXMEDETOMIDINE HYDROCHLORIDE 4 UG/ML
INJECTION, SOLUTION INTRAVENOUS AS NEEDED
Status: DISCONTINUED | OUTPATIENT
Start: 2024-05-21 | End: 2024-05-21 | Stop reason: SURG

## 2024-05-21 RX ORDER — DROPERIDOL 2.5 MG/ML
0.62 INJECTION, SOLUTION INTRAMUSCULAR; INTRAVENOUS ONCE AS NEEDED
Status: DISCONTINUED | OUTPATIENT
Start: 2024-05-21 | End: 2024-05-21 | Stop reason: HOSPADM

## 2024-05-21 RX ORDER — ONDANSETRON 2 MG/ML
4 INJECTION INTRAMUSCULAR; INTRAVENOUS ONCE AS NEEDED
Status: DISCONTINUED | OUTPATIENT
Start: 2024-05-21 | End: 2024-05-21 | Stop reason: HOSPADM

## 2024-05-21 RX ORDER — IBUPROFEN 600 MG/1
600 TABLET ORAL EVERY 6 HOURS PRN
Status: DISCONTINUED | OUTPATIENT
Start: 2024-05-21 | End: 2024-05-21 | Stop reason: HOSPADM

## 2024-05-21 RX ORDER — SODIUM CHLORIDE, SODIUM LACTATE, POTASSIUM CHLORIDE, CALCIUM CHLORIDE 600; 310; 30; 20 MG/100ML; MG/100ML; MG/100ML; MG/100ML
100 INJECTION, SOLUTION INTRAVENOUS CONTINUOUS
Status: DISCONTINUED | OUTPATIENT
Start: 2024-05-21 | End: 2024-05-21 | Stop reason: HOSPADM

## 2024-05-21 RX ORDER — PROPOFOL 10 MG/ML
INJECTION, EMULSION INTRAVENOUS AS NEEDED
Status: DISCONTINUED | OUTPATIENT
Start: 2024-05-21 | End: 2024-05-21 | Stop reason: SURG

## 2024-05-21 RX ORDER — LIDOCAINE HYDROCHLORIDE 10 MG/ML
0.5 INJECTION, SOLUTION EPIDURAL; INFILTRATION; INTRACAUDAL; PERINEURAL ONCE AS NEEDED
Status: DISCONTINUED | OUTPATIENT
Start: 2024-05-21 | End: 2024-05-21 | Stop reason: HOSPADM

## 2024-05-21 RX ORDER — SODIUM CHLORIDE 0.9 % (FLUSH) 0.9 %
3 SYRINGE (ML) INJECTION EVERY 12 HOURS SCHEDULED
Status: DISCONTINUED | OUTPATIENT
Start: 2024-05-21 | End: 2024-05-21 | Stop reason: HOSPADM

## 2024-05-21 RX ORDER — NALOXONE HCL 0.4 MG/ML
0.4 VIAL (ML) INJECTION AS NEEDED
Status: DISCONTINUED | OUTPATIENT
Start: 2024-05-21 | End: 2024-05-21 | Stop reason: HOSPADM

## 2024-05-21 RX ORDER — HYDROCODONE BITARTRATE AND ACETAMINOPHEN 10; 325 MG/1; MG/1
1 TABLET ORAL EVERY 4 HOURS PRN
Status: DISCONTINUED | OUTPATIENT
Start: 2024-05-21 | End: 2024-05-21 | Stop reason: HOSPADM

## 2024-05-21 RX ORDER — LIDOCAINE HYDROCHLORIDE 20 MG/ML
INJECTION, SOLUTION EPIDURAL; INFILTRATION; INTRACAUDAL; PERINEURAL AS NEEDED
Status: DISCONTINUED | OUTPATIENT
Start: 2024-05-21 | End: 2024-05-21 | Stop reason: SURG

## 2024-05-21 RX ORDER — LABETALOL HYDROCHLORIDE 5 MG/ML
5 INJECTION, SOLUTION INTRAVENOUS
Status: DISCONTINUED | OUTPATIENT
Start: 2024-05-21 | End: 2024-05-21 | Stop reason: HOSPADM

## 2024-05-21 RX ADMIN — PROPOFOL 150 MG: 10 INJECTION, EMULSION INTRAVENOUS at 09:19

## 2024-05-21 RX ADMIN — DEXMEDETOMIDINE HYDROCHLORIDE 20 MCG: 4 INJECTION, SOLUTION INTRAVENOUS at 09:17

## 2024-05-21 RX ADMIN — HEPARIN SODIUM 5000 UNITS: 5000 INJECTION, SOLUTION INTRAVENOUS; SUBCUTANEOUS at 08:57

## 2024-05-21 RX ADMIN — SODIUM CHLORIDE, POTASSIUM CHLORIDE, SODIUM LACTATE AND CALCIUM CHLORIDE: 600; 310; 30; 20 INJECTION, SOLUTION INTRAVENOUS at 09:17

## 2024-05-21 RX ADMIN — SODIUM CHLORIDE, POTASSIUM CHLORIDE, SODIUM LACTATE AND CALCIUM CHLORIDE 1000 ML: 600; 310; 30; 20 INJECTION, SOLUTION INTRAVENOUS at 08:17

## 2024-05-21 RX ADMIN — LIDOCAINE HYDROCHLORIDE 200 MG: 20 INJECTION, SOLUTION EPIDURAL; INFILTRATION; INTRACAUDAL; PERINEURAL at 09:19

## 2024-05-21 RX ADMIN — PROPOFOL 50 MG: 10 INJECTION, EMULSION INTRAVENOUS at 09:30

## 2024-05-21 RX ADMIN — CEFAZOLIN 2 G: 2 INJECTION, POWDER, FOR SOLUTION INTRAMUSCULAR; INTRAVENOUS at 09:17

## 2024-05-21 RX ADMIN — DROPERIDOL 1.25 MG: 2.5 INJECTION, SOLUTION INTRAMUSCULAR; INTRAVENOUS at 09:17

## 2024-05-21 NOTE — ANESTHESIA PREPROCEDURE EVALUATION
Anesthesia Evaluation     no history of anesthetic complications:   NPO Solid Status: > 8 hours  NPO Liquid Status: > 8 hours           Airway   Mallampati: I  TM distance: >3 FB  No difficulty expected  Dental      Pulmonary    (-) sleep apnea, not a smoker  Cardiovascular   Exercise tolerance: good (4-7 METS)    (+) hypertension  (-) CAD      Neuro/Psych  (-) seizures, TIA, CVA  GI/Hepatic/Renal/Endo    (-) liver disease, no renal disease, diabetes    Musculoskeletal     Abdominal    Substance History      OB/GYN          Other      history of cancer (ovarian cancer in remission, s/p surgery and chemo) remission                Anesthesia Plan    ASA 2     MAC     intravenous induction     Anesthetic plan, risks, benefits, and alternatives have been provided, discussed and informed consent has been obtained with: patient.    CODE STATUS:

## 2024-05-21 NOTE — INTERVAL H&P NOTE
H&P reviewed. The patient was examined and there are no changes to the H&P.   Questions answered, consent signed.

## 2024-05-21 NOTE — DISCHARGE INSTRUCTIONS
Okay to shower 48 hours after surgery.  No soaking under water for 2 weeks. May resume regular activities after surgery. Alternate ibuprofen and tylenol around-the-clock as needed. Continue on a Regular diet.  Resume Eliquis 24 hours after surgery.

## 2024-05-21 NOTE — ANESTHESIA POSTPROCEDURE EVALUATION
Patient: Tiff Elizalde    Procedure Summary       Date: 05/21/24 Room / [facility]:  [facility]  [facility]    Anesthesia Start: 0917 Anesthesia Stop: 0948    Procedure: REMOVAL VENOUS ACCESS DEVICE (Chin to Nipples) Diagnosis:       Port-A-Cath in place      (Port-A-Cath in place [Z95.828])    Surgeons: Abdi Hills MD Provider: Rex Millan CRNA    Anesthesia Type: MAC ASA Status: 2            Anesthesia Type: MAC    Vitals  Vitals Value Taken Time   BP     Temp     Pulse 78 05/21/24 0948   Resp     SpO2 96 % 05/21/24 0948   Vitals shown include unfiled device data.        Post Anesthesia Care and Evaluation    Patient location during evaluation: PHASE II  Patient participation: complete - patient participated  Level of consciousness: awake and alert  Pain management: adequate    Airway patency: patent  Anesthetic complications: No anesthetic complications    Cardiovascular status: acceptable  Respiratory status: acceptable  Hydration status: acceptable    Comments: Blood pressure 130/67, pulse 66, temperature 97.2 °F (36.2 °C), temperature source Temporal, resp. rate 16, SpO2 99%, not currently breastfeeding.    Pt discharged from PACU based on amanda score >8

## 2024-05-21 NOTE — OP NOTE
OPERATIVE NOTE    Patient Name:  Tiff Elizalde  YOB: 1965  MRN:  [id number]      Date of Surgery:  5/21/2024      PREOPERATIVE DIAGNOSIS: History of ovarian serous adenocarcinoma, exhausted vascular access, history of anticoagulation      POSTOPERATIVE DIAGNOSIS:  Same       PROCEDURE(S): Left subclavian Mediport removal       ATTENDING SURGEON:  Abdi Hills MD      ADDITIONAL SURGEON:  None      ASSISTANT(S):  Jerome JOY       SPECIMENS:  Mediport       ANESTHESIA:  MAC with local      ESTIMATED BLOOD LOSS:  1 mL      FLUIDS:  200 mL      WOUND CLASSIFICATION:  Class 1, clean      IMPLANTS:  None      DRAINS:  None       FINDINGS:   1.  Left subclavian Mediport dissected free from the cutaneous tissue.  Subcutaneous pump and catheter removed in its entirety.  Meticulous hemostasis.       INDICATIONS: The patient is a 58 y.o. female history of serous adenocarcinoma of the ovary who is undergoing systemic chemotherapy, now completed.  Would like to have her Mediport removed.       DESCRIPTION OF PROCEDURE:   The patient was seen in the preoperative holding area and the history and physical exam was updated. Informed consent was obtained from the patient. The patient was taken to the operating room and placed on the operating room table in the supine position. SCDs were placed on both legs.  Ancef 2 g was administered for preoperative antibiotics.  Patient was administered while the airway was monitored by the anesthetist.  The patient was prepped and draped in the usual sterile fashion. A full surgical timeout was performed verifying the correct patient, correct procedure and correct site for surgery.     10 cc of lidocaine 1% with epinephrine was infiltrated around the Mediport site.  A 15 blade scalpel was used to make a transverse incision inferior to the previous scar.  With the FiO2 decreased, electrocautery was used to dissect down through the soft tissue and the Mediport was freed up from and  surrounding subcutaneous tissue attachments.  The suture holding the Mediport in place were divided.  The patient was placed in Trendelenburg and while holding pressure at her catheter site, the Mediport was removed in its entirety.  This was sent to pathology for gross identification.  Once hemostasis was achieved, the deep dermal layer was run closed using a 3-0 Vicryl suture.  There was excellent hemostasis.  The incision was dressed with Mastisol and Steri-Strips.    At the completion of the procedure, all sharp, sponge and instrument counts were correct x2. The patient was awoken from anesthesia and taken to the postanesthesia care unit in stable condition without any immediate complications.      Abdi Hills MD  5/21/2024  09:46 CDT    Please note that portions of this note were completed with a voice recognition program.

## 2024-05-23 LAB
QT INTERVAL: 380 MS
QTC INTERVAL: 430 MS

## 2024-06-06 ENCOUNTER — OFFICE VISIT (OUTPATIENT)
Dept: SURGERY | Facility: CLINIC | Age: 59
End: 2024-06-06
Payer: COMMERCIAL

## 2024-06-06 VITALS
DIASTOLIC BLOOD PRESSURE: 85 MMHG | OXYGEN SATURATION: 97 % | HEART RATE: 70 BPM | BODY MASS INDEX: 31.58 KG/M2 | WEIGHT: 201.2 LBS | SYSTOLIC BLOOD PRESSURE: 139 MMHG | HEIGHT: 67 IN

## 2024-06-06 DIAGNOSIS — Z95.828 PORT-A-CATH IN PLACE: Primary | ICD-10-CM

## 2024-06-06 PROCEDURE — 99024 POSTOP FOLLOW-UP VISIT: CPT | Performed by: SURGERY

## 2024-06-06 NOTE — PROGRESS NOTES
OFFICE FOLLOW UP VISIT    Referring Provider: No ref. provider found  Primary Care Provider: Tyrone Garza MD    Chief Complaint   Patient presents with    Post-op     Patient here for post op port removal        Subjective .       HPI    Doing well after Mediport removal.  Left chest feels much better.    History:  Past Medical History:   Diagnosis Date    Blood clot in vein     COVID     Goiter     History of transfusion     Hypertension     Leg fracture     Numbness of toes     SIDE EFFECT FROM CHEMO    Ovarian cancer 12/15/2022   ,   Past Surgical History:   Procedure Laterality Date    HYSTERECTOMY      TONSILLECTOMY      TUMOR REMOVAL Left 11/2022    VENA CAVA FILTER INSERTION      VENA CAVA FILTER REMOVAL N/A 11/17/2023    Procedure: ATTEMPTED VENA CAVA FILTER REMOVAL;  Surgeon: All Adorno DO;  Location:  [facility] 12;  Service: Vascular;  Laterality: N/A;    VENOUS ACCESS DEVICE (PORT) INSERTION N/A 12/30/2022    Procedure: INSERTION VENOUS ACCESS DEVICE;  Surgeon: Holley Mcgill MD;  Location:  [facility] OR;  Service: General;  Laterality: N/A;    VENOUS ACCESS DEVICE (PORT) REMOVAL N/A 5/21/2024    Procedure: REMOVAL VENOUS ACCESS DEVICE;  Surgeon: Abdi Hills MD;  Location:  [facility] OR;  Service: General;  Laterality: N/A;   ,   Family History   Problem Relation Age of Onset    Hypertension Mother     Multiple sclerosis Father     Heart attack Maternal Grandfather     Diabetes Paternal Grandmother     Heart attack Paternal Grandfather    ,   Social History     Tobacco Use    Smoking status: Never    Smokeless tobacco: Never   Vaping Use    Vaping status: Never Used   Substance Use Topics    Alcohol use: Never    Drug use: Never       Current Outpatient Medications:     Calcium Carbonate-Vit D-Min (CALCIUM 1200 PO), Take 1 tablet by mouth Daily., Disp: , Rfl:     Eliquis 5 MG tablet tablet, 1 tablet Every 12 (Twelve) Hours., Disp: , Rfl:     letrozole (FEMARA) 2.5 MG tablet, Take 1  "tablet by mouth Daily., Disp: , Rfl:     lidocaine-prilocaine (EMLA) 2.5-2.5 % cream, APPLY TOPICALLY TO PORT SITE 30 MINUTES PRIOR TO BEING ACCESSED, Disp: 30 g, Rfl: 0    lisinopril-hydrochlorothiazide (PRINZIDE,ZESTORETIC) 20-12.5 MG per tablet, Take 1 tablet by mouth Daily., Disp: , Rfl:     vitamin D3 125 MCG (5000 UT) capsule capsule, Take  by mouth Daily., Disp: , Rfl:     Allergies:  Patient has no known allergies.      The following portions of the patient's history were reviewed and updated as appropriate: allergies, current medications, past family history, past medical history, past social history, past surgical history, and problem list.      Review of Systems  A comprehensive 14 point review of systems was performed and is negative unless otherwise noted      Objective   /85 (BP Location: Right arm, Patient Position: Sitting, Cuff Size: Adult)   Pulse 70   Ht 169.4 cm (66.69\")   Wt 91.3 kg (201 lb 3.2 oz)   SpO2 97%   BMI 31.80 kg/m²     BMI followup discussion/instruction with patient: none (medical contraindication)      Physical Exam  Pleasant middle-age female.  Left chest incision well-healed.    Results:  Labs:  I personally reviewed all pertinent labs. No recent labs  Imaging: I personally reviewed all pertinent imaging studies. No new imaging   Pathology:      Assessment & Plan   Diagnoses and all orders for this visit:    1. Port-A-Cath in place (Primary)    The patient is convalescing appropriately.  Incision has completely healed.  Doing well overall.  Follow-up as needed.         Abdi Hills MD, FACS  General Surgery  6/6/2024  08:55 CDT    Please note that portions of this note were completed with a voice recognition program.   "

## 2024-07-16 ENCOUNTER — HOSPITAL ENCOUNTER (OUTPATIENT)
Dept: CT IMAGING | Facility: HOSPITAL | Age: 59
Discharge: HOME | End: 2024-07-16
Payer: COMMERCIAL

## 2024-07-16 ENCOUNTER — HOSPITAL ENCOUNTER (OUTPATIENT)
Dept: ULTRASOUND IMAGING | Facility: HOSPITAL | Age: 59
Discharge: HOME | End: 2024-07-16
Payer: COMMERCIAL

## 2024-07-16 ENCOUNTER — LAB (OUTPATIENT)
Dept: LAB | Facility: HOSPITAL | Age: 59
End: 2024-07-16
Payer: COMMERCIAL

## 2024-07-16 DIAGNOSIS — C56.2 MALIGNANT NEOPLASM OF LEFT OVARY: ICD-10-CM

## 2024-07-16 LAB
ALBUMIN SERPL-MCNC: 4.2 G/DL (ref 3.5–5.2)
ALBUMIN/GLOB SERPL: 1.4 G/DL
ALP SERPL-CCNC: 113 U/L (ref 39–117)
ALT SERPL W P-5'-P-CCNC: 15 U/L (ref 1–33)
ANION GAP SERPL CALCULATED.3IONS-SCNC: 9 MMOL/L (ref 5–15)
AST SERPL-CCNC: 18 U/L (ref 1–32)
BASOPHILS # BLD AUTO: 0.04 10*3/MM3 (ref 0–0.2)
BASOPHILS NFR BLD AUTO: 0.4 % (ref 0–1.5)
BILIRUB SERPL-MCNC: 0.3 MG/DL (ref 0–1.2)
BUN SERPL-MCNC: 22 MG/DL (ref 8–23)
BUN/CREAT SERPL: 16.3 (ref 7–25)
CALCIUM SPEC-SCNC: 10.3 MG/DL (ref 8.6–10.5)
CANCER AG125 SERPL QL: 37.2 U/ML (ref 0–38.1)
CHLORIDE SERPL-SCNC: 102 MMOL/L (ref 98–107)
CO2 SERPL-SCNC: 29 MMOL/L (ref 22–29)
CREAT SERPL-MCNC: 1.35 MG/DL (ref 0.57–1)
DEPRECATED RDW RBC AUTO: 39.8 FL (ref 37–54)
EGFRCR SERPLBLD CKD-EPI 2021: 45.1 ML/MIN/1.73
EOSINOPHIL # BLD AUTO: 0.02 10*3/MM3 (ref 0–0.4)
EOSINOPHIL NFR BLD AUTO: 0.2 % (ref 0.3–6.2)
ERYTHROCYTE [DISTWIDTH] IN BLOOD BY AUTOMATED COUNT: 12.2 % (ref 12.3–15.4)
GLOBULIN UR ELPH-MCNC: 3.1 GM/DL
GLUCOSE SERPL-MCNC: 81 MG/DL (ref 65–99)
HCT VFR BLD AUTO: 38 % (ref 34–46.6)
HGB BLD-MCNC: 12.7 G/DL (ref 12–15.9)
IMM GRANULOCYTES # BLD AUTO: 0.02 10*3/MM3 (ref 0–0.05)
IMM GRANULOCYTES NFR BLD AUTO: 0.2 % (ref 0–0.5)
LYMPHOCYTES # BLD AUTO: 2.26 10*3/MM3 (ref 0.7–3.1)
LYMPHOCYTES NFR BLD AUTO: 24.6 % (ref 19.6–45.3)
MCH RBC QN AUTO: 29.3 PG (ref 26.6–33)
MCHC RBC AUTO-ENTMCNC: 33.4 G/DL (ref 31.5–35.7)
MCV RBC AUTO: 87.6 FL (ref 79–97)
MONOCYTES # BLD AUTO: 0.64 10*3/MM3 (ref 0.1–0.9)
MONOCYTES NFR BLD AUTO: 7 % (ref 5–12)
NEUTROPHILS NFR BLD AUTO: 6.21 10*3/MM3 (ref 1.7–7)
NEUTROPHILS NFR BLD AUTO: 67.6 % (ref 42.7–76)
NRBC BLD AUTO-RTO: 0 /100 WBC (ref 0–0.2)
PLATELET # BLD AUTO: 221 10*3/MM3 (ref 140–450)
PMV BLD AUTO: 9.4 FL (ref 6–12)
POTASSIUM SERPL-SCNC: 3.8 MMOL/L (ref 3.5–5.2)
PROT SERPL-MCNC: 7.3 G/DL (ref 6–8.5)
RBC # BLD AUTO: 4.34 10*6/MM3 (ref 3.77–5.28)
SODIUM SERPL-SCNC: 140 MMOL/L (ref 136–145)
WBC NRBC COR # BLD AUTO: 9.19 10*3/MM3 (ref 3.4–10.8)

## 2024-07-16 PROCEDURE — 93971 EXTREMITY STUDY: CPT

## 2024-07-16 PROCEDURE — 71250 CT THORAX DX C-: CPT

## 2024-07-16 PROCEDURE — 80053 COMPREHEN METABOLIC PANEL: CPT

## 2024-07-16 PROCEDURE — 36415 COLL VENOUS BLD VENIPUNCTURE: CPT

## 2024-07-16 PROCEDURE — 85025 COMPLETE CBC W/AUTO DIFF WBC: CPT

## 2024-07-16 PROCEDURE — 86304 IMMUNOASSAY TUMOR CA 125: CPT

## 2024-07-17 ENCOUNTER — TELEPHONE (OUTPATIENT)
Dept: ONCOLOGY | Facility: CLINIC | Age: 59
End: 2024-07-17
Payer: COMMERCIAL

## 2024-07-17 NOTE — TELEPHONE ENCOUNTER
----- Message from Walker Magana sent at 7/16/2024 12:46 PM CDT -----  Send report to PCP.  Similar large RIGHT thyroid nodule. Recommend dedicated thyroid  ultrasound if not previously performed an outside facility.      No new suspicious finding. Similar small pulmonary nodules compared  to prior.

## 2024-08-08 NOTE — PROGRESS NOTES
[facility]  [address]  [place] [address] [phone]    Patient Name: Tiff Elizalde  Encounter Date: 08/16/2024  YOB: 1965  Patient Number: [id number]      REASON FOR FOLLOW-UP: Tiff Elizalde is a pleasant 58 y.o.  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 She is seen 15.5 months post 6 cycles of adjuvant paclitaxel and carboplatin.  She had hypersensitivity reaction to paclitaxel on C1D1.  She is taking adjuvant letrozole since 6/2023 through present. The patient is seen alone.  History is obtained from patient.  History is considered reliable.         Oncology/Hematology History Overview Note   DIAGNOSTIC ABNORMALITIES:   Pain lower back and left leg swelling.  She presented to her PCP.   CT scan done at [facility]. Details not available.  Patient seen by Dr. Virgilio Hayward 11/23/2022. Patient found to have a large pelvic mass causing bilateral hydronephrosis with resultant nonfunctioning left kidney, elevated creatinine, left deep venous thrombosis and pulmonary embolus.  Pathology report 11/28/2022.  Fallopian tube and ovary, left salpingo-oophorectomy: Fallopian tube with no evidence of involvement by malignancy.  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  No surface involvement demonstrated.  Uterus, fallopian tube and ovary, hysterectomy with right salpingo-oophorectomy: Cervix and endocervix with no evidence of squamous or glandular dysplasia.  Chronic cervicitis present.  Weakly proliferative endometrium, negative for hyperplasia, carcinoma, and endometritis.  Benign endometrial polyp present.  Adenomyosis.  No leiomyomata.  Right fallopian tube with focal involvement by serous neoplasm with Samona by this most consistent with low-grade serous carcinoma.  Remnant left ovary with  adhesions to uterine serosa and focal involvement by low-grade serous carcinoma.  Right ovary with surface involvement by low-grade serous carcinoma.  Omentum biopsy: Involved by low-grade serous carcinoma, invasive).  Sidewall, left pelvic biopsy: Edema with hemorrhage and chronic inflammation, negative for endometriosis and malignancy.  Lymph node left pelvic biopsy: 1 lymph node with a 0.9 mm focus of metastatic serous carcinoma surrounding adipose tissue with hemorrhage and edema.           PREVIOUS INTERVENTIONS:  IVC filter was placed 11/22/2022 at [place].  She had undergone exploratory laparotomy, total abdominal hysterectomy, bilateral salpingo-oophorectomy, debulking, left pelvic lymph node dissection and omentectomy by Dr. Hayward on 11/23/2022.  Estimated blood loss 400 ml.  Laparotomy showed a massive mass arising from the left ovary, retroperitoneal, infiltrating the retroperitoneal space and adherent to the sigmoid mesentery.  Within the retroperitoneum it was densely adherent to the iliac vessel on the left side and ureter.  Deliberate rupture of the mass was required in order to access the retroperitoneal attachment.  The mass was sent for frozen section and determined to be at least borderline tumor possibly low-grade serous tumor.  There was no other evidence of disease in the pelvis or upper abdomen.  Due to being densely adherent to the retroperitoneum and iliac vessels, it is unclear whether the entire tumor was removed or not and the pelvic sidewall biopsy was taken to determine whether the residual ovary was present and not a fibrotic reaction.  Bilateral ureterolysis was required.   Hypersensitivity reaction to Taxol 01/09/2023.   Adjuvant paclitaxel and carboplatin 01/10/2023 through 4/28/2023, 6 cycles.   Adjuvant letrozole on 6/2023 through present.     Ovarian cancer   12/15/2022 Initial Diagnosis    Ovarian cancer (HCC)     1/9/2023 - 4/28/2023 Chemotherapy    OP OVARIAN PACLitaxel /  CARBOplatin (Q21D)     1/9/2023 -  Chemotherapy    OP CENTRAL VENOUS ACCESS DEVICE ACCESS, CARE, AND MAINTENANCE ([facility]AD)     1/20/2023 Cancer Staged    Staging form: Ovary, Fallopian Tube, And Primary Peritoneal Carcinoma, AJCC 8th Edition  - Pathologic stage from 1/20/2023: FIGO Stage III (pT3, pN1, cM0) - Signed by Walker Magana MD on 1/20/2023     2/15/2023 - 2/15/2023 Chemotherapy    OP MAGNESIUM          PAST MEDICAL HISTORY:  ALLERGIES:  No Known Allergies  CURRENT MEDICATIONS:  Outpatient Encounter Medications as of 8/16/2024   Medication Sig Dispense Refill    Calcium Carbonate-Vit D-Min (CALCIUM 1200 PO) Take 1 tablet by mouth Daily.      Eliquis 5 MG tablet tablet 1 tablet Every 12 (Twelve) Hours.      letrozole (FEMARA) 2.5 MG tablet Take 1 tablet by mouth Daily.      lidocaine-prilocaine (EMLA) 2.5-2.5 % cream APPLY TOPICALLY TO PORT SITE 30 MINUTES PRIOR TO BEING ACCESSED 30 g 0    lisinopril-hydrochlorothiazide (PRINZIDE,ZESTORETIC) 20-12.5 MG per tablet Take 1 tablet by mouth Daily.      vitamin D3 125 MCG (5000 UT) capsule capsule Take  by mouth Daily.       No facility-administered encounter medications on file as of 8/16/2024.     ADULT ILLNESSES:  Patient Active Problem List   Diagnosis Code    Ovarian cancer C56.9     SURGERIES:  Past Surgical History:   Procedure Laterality Date    HYSTERECTOMY      TONSILLECTOMY      TUMOR REMOVAL Left 11/2022    VENA CAVA FILTER INSERTION      VENA CAVA FILTER REMOVAL N/A 11/17/2023    Procedure: ATTEMPTED VENA CAVA FILTER REMOVAL;  Surgeon: All Adorno DO;  Location: [facility] OR 12;  Service: Vascular;  Laterality: N/A;    VENOUS ACCESS DEVICE (PORT) INSERTION N/A 12/30/2022    Procedure: INSERTION VENOUS ACCESS DEVICE;  Surgeon: Holley Mcgill MD;  Location: [facility] OR;  Service: General;  Laterality: N/A;    VENOUS ACCESS DEVICE (PORT) REMOVAL N/A 5/21/2024    Procedure: REMOVAL VENOUS ACCESS DEVICE;  Surgeon: Abdi Hills MD;  Location:   "[facility] OR;  Service: General;  Laterality: N/A;     HEALTH MAINTENANCE ITEMS:  Health Maintenance Due   Topic Date Due    MAMMOGRAM  Never done    BMI FOLLOWUP  Never done    COLORECTAL CANCER SCREENING  Never done    TDAP/TD VACCINES (2 - Tdap) 02/24/2008    ZOSTER VACCINE (1 of 2) Never done    HEPATITIS C SCREENING  Never done    ANNUAL PHYSICAL  Never done    COVID-19 Vaccine (4 - 2023-24 season) 09/01/2023    INFLUENZA VACCINE  08/01/2024       <no information>  Last Completed Colonoscopy       This patient has no relevant Health Maintenance data.          Immunization History   Administered Date(s) Administered    COVID-19 (MODERNA) 1st,2nd,3rd Dose Monovalent 07/29/2021, 08/26/2021    COVID-19 (MODERNA) Monovalent Original Booster 01/27/2022     Last Completed Mammogram       This patient has no relevant Health Maintenance data.              FAMILY HISTORY:  Family History   Problem Relation Age of Onset    Hypertension Mother     Multiple sclerosis Father     Heart attack Maternal Grandfather     Diabetes Paternal Grandmother     Heart attack Paternal Grandfather      SOCIAL HISTORY:  Social History     Socioeconomic History    Marital status:    Tobacco Use    Smoking status: Never    Smokeless tobacco: Never   Vaping Use    Vaping status: Never Used   Substance and Sexual Activity    Alcohol use: Never    Drug use: Never    Sexual activity: Defer       REVIEW OF SYSTEMS:    Review of Systems   Constitutional:  Negative for fatigue, fever and unexpected weight change.        \"I feel good.\"   HENT:  Negative for congestion and mouth sores.    Eyes:  Negative for discharge and redness.   Respiratory:  Negative for shortness of breath and wheezing.    Cardiovascular:  Negative for chest pain and leg swelling.   Gastrointestinal:  Negative for constipation, diarrhea, nausea and vomiting.   Endocrine: Negative for cold intolerance and heat intolerance.   Genitourinary:  Negative for dysuria and vaginal " "bleeding.   Musculoskeletal:  Negative for gait problem and myalgias.   Skin:  Negative for pallor.   Allergic/Immunologic: Negative for food allergies.   Neurological:  Negative for dizziness and speech difficulty.        \"My toes still tingle. My hands are fine.\"   Hematological:  Negative for adenopathy. Does not bruise/bleed easily.   Psychiatric/Behavioral:  Negative for agitation, confusion and hallucinations.          VITAL SIGNS: /74   Pulse 86   Temp 97.4 °F (36.3 °C)   Resp 18   Ht 169.4 cm (66.69\")   Wt 91.2 kg (201 lb)   SpO2 96%   Breastfeeding No   BMI 31.77 kg/m²  Gained 4 pounds.   Pain Score    08/16/24 0837   PainSc: 0-No pain       PHYSICAL EXAMINATION:     Physical Exam  Vitals reviewed.   Constitutional:       General: She is not in acute distress.  HENT:      Head: Normocephalic and atraumatic.   Eyes:      General: No scleral icterus.  Cardiovascular:      Rate and Rhythm: Normal rate.   Pulmonary:      Effort: No respiratory distress.      Breath sounds: No wheezing.      Comments: Port, left, removed.  Abdominal:      General: Bowel sounds are normal. There is no distension.      Palpations: Abdomen is soft.   Musculoskeletal:         General: No swelling.      Cervical back: Neck supple.   Skin:     Coloration: Skin is not pale.   Neurological:      Mental Status: She is alert and oriented to person, place, and time.   Psychiatric:         Mood and Affect: Mood normal.         Behavior: Behavior normal.         Thought Content: Thought content normal.         Judgment: Judgment normal.         LABS    Lab Results - Last 18 Months   Lab Units 07/16/24  0746 05/16/24  1422 04/16/24  0836 01/18/24  0933 11/14/23  1239 11/14/23  0857 09/27/23  0902   HEMOGLOBIN g/dL 12.7 12.3 12.1 11.6* 12.0 12.1 11.7*   HEMATOCRIT % 38.0 36.1 36.1 34.8 36.4 37.0 36.4   MCV fL 87.6 86.4 86.6 87.0 88.6 88.5 89.4   WBC 10*3/mm3 9.19 9.58 8.25 7.96 7.65 7.74 7.41   RDW % 12.2* 12.4 12.5 12.3 12.2* " "12.1* 12.6   MPV fL 9.4 9.5 9.6 9.3 9.6 9.3 9.4   PLATELETS 10*3/mm3 221 227 209 204 197 194 205   IMM GRAN % % 0.2  --  0.2 0.3 0.3 0.4 0.3   NEUTROS ABS 10*3/mm3 6.21  --  5.49 5.12 4.97 5.30 4.73   LYMPHS ABS 10*3/mm3 2.26  --  1.98 2.00 1.88 1.58 1.89   MONOS ABS 10*3/mm3 0.64  --  0.68 0.71 0.70 0.73 0.67   EOS ABS 10*3/mm3 0.02  --  0.04 0.06 0.04 0.05 0.06   BASOS ABS 10*3/mm3 0.04  --  0.04 0.05 0.04 0.05 0.04   IMMATURE GRANS (ABS) 10*3/mm3 0.02  --  0.02 0.02 0.02 0.03 0.02   NRBC /100 WBC 0.0  --  0.0 0.0 0.0 0.0 0.0       Lab Results - Last 18 Months   Lab Units 07/16/24  0746 05/16/24  1422 04/16/24  0836 01/18/24  0933 12/27/23  0921 11/14/23  1239 11/14/23  0857 09/27/23  0902 08/16/23  0802   GLUCOSE mg/dL 81 129* 95 100*  --  89 97 69 101*   SODIUM mmol/L 140 140 141 140  --  142 139 141 140   POTASSIUM mmol/L 3.8 4.2 4.0 3.9  --  3.9 4.4 3.8 4.1   CO2 mmol/L 29.0 30.0* 26.0 26.0  --  30.0* 30.0* 27.0 28.0   CHLORIDE mmol/L 102 101 104 104  --  102 103 104 104   ANION GAP mmol/L 9.0 9.0 11.0 10.0  --  10.0 6.0 10.0 8.0   CREATININE mg/dL 1.35* 1.25* 1.46* 1.25* 1.40* 1.36* 1.27* 1.39* 1.50*   BUN mg/dL 22 17 29* 23  --  23 22 20 24*   BUN / CREAT RATIO  16.3 13.6 19.9 18.4  --  16.9 17.3 14.4 16.0   CALCIUM mg/dL 10.3 10.2 10.0 9.7  --  9.7 9.8 10.0 10.4   ALK PHOS U/L 113  --  93 78  --   --  75 69 71   TOTAL PROTEIN g/dL 7.3  --  7.1 6.9  --   --  7.1 6.8 7.2   ALT (SGPT) U/L 15  --  15 18  --   --  14 10 17   AST (SGOT) U/L 18  --  19 19  --   --  17 16 16   BILIRUBIN mg/dL 0.3  --  0.4 0.3  --   --  0.2 0.3 0.2   ALBUMIN g/dL 4.2  --  4.4 4.1  --   --  4.3 4.1 4.4   GLOBULIN gm/dL 3.1  --  2.7 2.8  --   --  2.8 2.7 2.8       No results for input(s): \"MSPIKE\", \"KAPPALAMB\", \"IGLFLC\", \"URICACID\", \"FREEKAPPAL\", \"CEA\", \"LDH\", \"REFLABREPO\" in the last [id number] hours.    Lab Results - Last 18 Months   Lab Units 06/21/23  0750   IRON mcg/dL 76   TIBC mcg/dL 325   IRON SATURATION (TSAT) % 23   FERRITIN " "ng/mL 262.30*       Tiff Elizalde reports a pain score of 0.        ASSESSMENT:  1.  Ovarian cancer, low-grade serous carcinoma, left.  Tumor size 18 cm. Negative genetic abnormality.   AJCC stage:IIIA1 (pT3, pN1, cM0, G1)  Treatment status: Postadjuvant paclitaxel and carboplatin 01/10/2023 through 4/28/2023, 6 cycles.  She is taking adjuvant letrozole 2.5 mg 6/2023 through present by Dr. Hayward.  2.  Performance status of 0.  3.  Left deep venous thrombosis from malignancy.  She is taking apixaban.  4.  Pulmonary embolism from malignancy.  Undergoing treatment with apixaban.  Post IVC filter 11/22/2022.  Unable to remove IVC filter on multiple attempts on 11/17/2023.  5.  Anemia from iron deficiency and chronic kidney disease stage IIIa, GFR 59.3 ml/min on 2/3/2023. Oral iron 01/10/2023 through 6/21/2023.    6.  Hypersensitivity reaction to paclitaxel on 01/09/2023. Responded to dexamethasone as premed.   7.  Grade 1 neuropathy. Under observation.  Patient is off gabapentin.    8.  RIGHT lower lobe 3 mm pulmonary nodule. On observation.  9.  RIGHT thyroid with a 4 cm heterogeneous nodule.  Management per primary care.  10. Nodule located in the segment 4 a of the liver measures 7 mm on 5/12/2023, MRI abdomen on 6/12/2023 showed benign cyst.  11.  Osteopenia.  She is taking calcium and D.               PLAN:  1.   Re: Venous Doppler on 7/16/2024. No evidence of acute DVT. Postthrombotic scarring within the left common femoral vein and proximal superficial femoral vein with mural thrombus/thickening. There is otherwise normal flow within these vessels. FINDINGS are similar to the previous exam.  Port removed on 5/21/2024.  2.   Re: Tolerance to letrozole. \"It's fine.\"  3.   Re: Heme status.  WBC 9.1, hemoglobin 12.7, hematocrit 38, MCV 87.6 and platelet 221.   4.   Re: CMP.  GFR 45.1 from 41 from 49.4 from 44.7 from 48.5 from 43.8 from 40 from 47.9 mL per minute   5.   Re: CA " "125 at 37.2 from 33.6 from 32.3 from 35 from 38.9 from 43 from 33.    6.   Re: CT chest report 7/16/2024.No new suspicious finding. Similar small pulmonary nodules compared to prior. Similar large RIGHT thyroid nodule. Sent to PCP.   7.   Re:  Stable for observation, ovarian carcinoma.   8.  Schedule CT of the chest without IV contrast 10/2024 to follow the 3 mm right lower lobe lung nodule.   9.  Discontinue port flushes.  10.  eRx for ondansetron 8 mg p.o. every 8 hours as needed for nausea and vomiting #60, 2 refills if needed.  11.  eRx gabapentin 300 mg po three times daily, 90, 2 refills if needed.  Monitor for dizziness, hypotension or hypersomnolence.  12.  eRx letrozole 2.5 mg po daily, 90, 3 refills if needed.  Monitor for bone loss or hot flashes.   13.  Continue ongoing management per primary care physician and the other specialists.  14.  Plan of care discussed with patient.  Understanding expressed.  The patient is agreeable to proceed.  15. Questions were answered to her satisfaction. \"Yes.\"  16.  She will be seen every 2 to 4 months for the first 2 years then every 3 to 6 months for the next 3 years.  Annually after 5 years.  Imaging as clinically indicated.  17.  Return to office in 12 weeks with CBC with differential, CMP, , and pre office left leg venous doppler.   18.  Referral to nephrology per PCP.  \"Dr. Garza is watching it.\"  To see Dr. Hayward 9/204.            I have reviewed the assessment and plan and verified the accuracy of it. No changes to assessment and plan since the information was documented. Walker Magana MD 08/16/24         I spent 31 total minutes, face-to-face, caring for Tiff today. Greater than 50% of this time involved counseling and/or coordination of care as documented within this note.               Virgilio Hayward MD  (Abdi Hills MD)  Tyrone Garza MD    "

## 2024-08-16 ENCOUNTER — OFFICE VISIT (OUTPATIENT)
Dept: ONCOLOGY | Facility: CLINIC | Age: 59
End: 2024-08-16
Payer: COMMERCIAL

## 2024-08-16 VITALS
RESPIRATION RATE: 18 BRPM | OXYGEN SATURATION: 96 % | HEART RATE: 86 BPM | BODY MASS INDEX: 31.55 KG/M2 | DIASTOLIC BLOOD PRESSURE: 74 MMHG | SYSTOLIC BLOOD PRESSURE: 132 MMHG | WEIGHT: 201 LBS | HEIGHT: 67 IN | TEMPERATURE: 97.4 F

## 2024-08-16 DIAGNOSIS — C56.2 MALIGNANT NEOPLASM OF LEFT OVARY: ICD-10-CM

## 2024-08-16 DIAGNOSIS — R91.1 LUNG NODULE SEEN ON IMAGING STUDY: Primary | ICD-10-CM

## 2024-08-16 NOTE — LETTER
August 16, 2024       No Recipients    Patient: Tiff Elizalde   YOB: 1965   Date of Visit: 8/16/2024     Dear Tyrone Garza MD:       Thank you for referring Tiff Elizalde to me for evaluation. Below are the relevant portions of my assessment and plan of care.    If you have questions, please do not hesitate to call me. I look forward to following Tiff along with you.         Sincerely,        Walker Magana MD        CC:   No Recipients    Walker Magana MD  08/16/24 0857  Sign when Signing Visit  [facility] HEMATOLOGY & ONCOLOGY  [address]  [place] [address] [phone]    Patient Name: Tiff Elizalde  Encounter Date: 08/16/2024  YOB: 1965  Patient Number: [id number]      REASON FOR FOLLOW-UP: Tiff Elizalde is a pleasant 58 y.o.  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 She is seen 15.5 months post 6 cycles of adjuvant paclitaxel and carboplatin.  She had hypersensitivity reaction to paclitaxel on C1D1.  She is taking adjuvant letrozole since 6/2023 through present. The patient is seen alone.  History is obtained from patient.  History is considered reliable.         Oncology/Hematology History Overview Note   DIAGNOSTIC ABNORMALITIES:   Pain lower back and left leg swelling.  She presented to her PCP.   CT scan done at [facility]. Details not available.  Patient seen by Dr. Virgilio Hayward 11/23/2022. Patient found to have a large pelvic mass causing bilateral hydronephrosis with resultant nonfunctioning left kidney, elevated creatinine, left deep venous thrombosis and pulmonary embolus.  Pathology report 11/28/2022.  Fallopian tube and ovary, left salpingo-oophorectomy: Fallopian tube with no evidence of involvement by malignancy.  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  No surface  involvement demonstrated.  Uterus, fallopian tube and ovary, hysterectomy with right salpingo-oophorectomy: Cervix and endocervix with no evidence of squamous or glandular dysplasia.  Chronic cervicitis present.  Weakly proliferative endometrium, negative for hyperplasia, carcinoma, and endometritis.  Benign endometrial polyp present.  Adenomyosis.  No leiomyomata.  Right fallopian tube with focal involvement by serous neoplasm with Samona by this most consistent with low-grade serous carcinoma.  Remnant left ovary with adhesions to uterine serosa and focal involvement by low-grade serous carcinoma.  Right ovary with surface involvement by low-grade serous carcinoma.  Omentum biopsy: Involved by low-grade serous carcinoma, invasive).  Sidewall, left pelvic biopsy: Edema with hemorrhage and chronic inflammation, negative for endometriosis and malignancy.  Lymph node left pelvic biopsy: 1 lymph node with a 0.9 mm focus of metastatic serous carcinoma surrounding adipose tissue with hemorrhage and edema.           PREVIOUS INTERVENTIONS:  IVC filter was placed 11/22/2022 at [place].  She had undergone exploratory laparotomy, total abdominal hysterectomy, bilateral salpingo-oophorectomy, debulking, left pelvic lymph node dissection and omentectomy by Dr. Hayward on 11/23/2022.  Estimated blood loss 400 ml.  Laparotomy showed a massive mass arising from the left ovary, retroperitoneal, infiltrating the retroperitoneal space and adherent to the sigmoid mesentery.  Within the retroperitoneum it was densely adherent to the iliac vessel on the left side and ureter.  Deliberate rupture of the mass was required in order to access the retroperitoneal attachment.  The mass was sent for frozen section and determined to be at least borderline tumor possibly low-grade serous tumor.  There was no other evidence of disease in the pelvis or upper abdomen.  Due to being densely adherent to the retroperitoneum and iliac vessels, it is  unclear whether the entire tumor was removed or not and the pelvic sidewall biopsy was taken to determine whether the residual ovary was present and not a fibrotic reaction.  Bilateral ureterolysis was required.   Hypersensitivity reaction to Taxol 01/09/2023.   Adjuvant paclitaxel and carboplatin 01/10/2023 through 4/28/2023, 6 cycles.   Adjuvant letrozole on 6/2023 through present.     Ovarian cancer   12/15/2022 Initial Diagnosis    Ovarian cancer (HCC)     1/9/2023 - 4/28/2023 Chemotherapy    OP OVARIAN PACLitaxel / CARBOplatin (Q21D)     1/9/2023 -  Chemotherapy    OP CENTRAL VENOUS ACCESS DEVICE ACCESS, CARE, AND MAINTENANCE (CVAD)     1/20/2023 Cancer Staged    Staging form: Ovary, Fallopian Tube, And Primary Peritoneal Carcinoma, AJCC 8th Edition  - Pathologic stage from 1/20/2023: FIGO Stage III (pT3, pN1, cM0) - Signed by Walker Magana MD on 1/20/2023     2/15/2023 - 2/15/2023 Chemotherapy    OP MAGNESIUM          PAST MEDICAL HISTORY:  ALLERGIES:  No Known Allergies  CURRENT MEDICATIONS:  Outpatient Encounter Medications as of 8/16/2024   Medication Sig Dispense Refill   • Calcium Carbonate-Vit D-Min (CALCIUM 1200 PO) Take 1 tablet by mouth Daily.     • Eliquis 5 MG tablet tablet 1 tablet Every 12 (Twelve) Hours.     • letrozole (FEMARA) 2.5 MG tablet Take 1 tablet by mouth Daily.     • lidocaine-prilocaine (EMLA) 2.5-2.5 % cream APPLY TOPICALLY TO PORT SITE 30 MINUTES PRIOR TO BEING ACCESSED 30 g 0   • lisinopril-hydrochlorothiazide (PRINZIDE,ZESTORETIC) 20-12.5 MG per tablet Take 1 tablet by mouth Daily.     • vitamin D3 125 MCG (5000 UT) capsule capsule Take  by mouth Daily.       No facility-administered encounter medications on file as of 8/16/2024.     ADULT ILLNESSES:  Patient Active Problem List   Diagnosis Code   • Ovarian cancer C56.9     SURGERIES:  Past Surgical History:   Procedure Laterality Date   • HYSTERECTOMY     • TONSILLECTOMY     • TUMOR REMOVAL Left 11/2022   • VENA CAVA FILTER  INSERTION     • VENA CAVA FILTER REMOVAL N/A 11/17/2023    Procedure: ATTEMPTED VENA CAVA FILTER REMOVAL;  Surgeon: All Adorno DO;  Location: [facility] HYBRID OR 12;  Service: Vascular;  Laterality: N/A;   • VENOUS ACCESS DEVICE (PORT) INSERTION N/A 12/30/2022    Procedure: INSERTION VENOUS ACCESS DEVICE;  Surgeon: Holley Mcgill MD;  Location:  [facility] OR;  Service: General;  Laterality: N/A;   • VENOUS ACCESS DEVICE (PORT) REMOVAL N/A 5/21/2024    Procedure: REMOVAL VENOUS ACCESS DEVICE;  Surgeon: Abdi Hills MD;  Location:  [facility] OR;  Service: General;  Laterality: N/A;     HEALTH MAINTENANCE ITEMS:  Health Maintenance Due   Topic Date Due   • MAMMOGRAM  Never done   • BMI FOLLOWUP  Never done   • COLORECTAL CANCER SCREENING  Never done   • TDAP/TD VACCINES (2 - Tdap) 02/24/2008   • ZOSTER VACCINE (1 of 2) Never done   • HEPATITIS C SCREENING  Never done   • ANNUAL PHYSICAL  Never done   • COVID-19 Vaccine (4 - 2023-24 season) 09/01/2023   • INFLUENZA VACCINE  08/01/2024       <no information>  Last Completed Colonoscopy       This patient has no relevant Health Maintenance data.          Immunization History   Administered Date(s) Administered   • COVID-19 (MODERNA) 1st,2nd,3rd Dose Monovalent 07/29/2021, 08/26/2021   • COVID-19 (MODERNA) Monovalent Original Booster 01/27/2022     Last Completed Mammogram       This patient has no relevant Health Maintenance data.              FAMILY HISTORY:  Family History   Problem Relation Age of Onset   • Hypertension Mother    • Multiple sclerosis Father    • Heart attack Maternal Grandfather    • Diabetes Paternal Grandmother    • Heart attack Paternal Grandfather      SOCIAL HISTORY:  Social History     Socioeconomic History   • Marital status:    Tobacco Use   • Smoking status: Never   • Smokeless tobacco: Never   Vaping Use   • Vaping status: Never Used   Substance and Sexual Activity   • Alcohol use: Never   • Drug use: Never   • Sexual activity: Defer  "      REVIEW OF SYSTEMS:    Review of Systems   Constitutional:  Negative for fatigue, fever and unexpected weight change.        \"I feel good.\"   HENT:  Negative for congestion and mouth sores.    Eyes:  Negative for discharge and redness.   Respiratory:  Negative for shortness of breath and wheezing.    Cardiovascular:  Negative for chest pain and leg swelling.   Gastrointestinal:  Negative for constipation, diarrhea, nausea and vomiting.   Endocrine: Negative for cold intolerance and heat intolerance.   Genitourinary:  Negative for dysuria and vaginal bleeding.   Musculoskeletal:  Negative for gait problem and myalgias.   Skin:  Negative for pallor.   Allergic/Immunologic: Negative for food allergies.   Neurological:  Negative for dizziness and speech difficulty.        \"My toes still tingle. My hands are fine.\"   Hematological:  Negative for adenopathy. Does not bruise/bleed easily.   Psychiatric/Behavioral:  Negative for agitation, confusion and hallucinations.          VITAL SIGNS: /74   Pulse 86   Temp 97.4 °F (36.3 °C)   Resp 18   Ht 169.4 cm (66.69\")   Wt 91.2 kg (201 lb)   SpO2 96%   Breastfeeding No   BMI 31.77 kg/m²  Gained 4 pounds.   Pain Score    08/16/24 0837   PainSc: 0-No pain       PHYSICAL EXAMINATION:     Physical Exam  Vitals reviewed.   Constitutional:       General: She is not in acute distress.  HENT:      Head: Normocephalic and atraumatic.   Eyes:      General: No scleral icterus.  Cardiovascular:      Rate and Rhythm: Normal rate.   Pulmonary:      Effort: No respiratory distress.      Breath sounds: No wheezing.      Comments: Port, left, removed.  Abdominal:      General: Bowel sounds are normal. There is no distension.      Palpations: Abdomen is soft.   Musculoskeletal:         General: No swelling.      Cervical back: Neck supple.   Skin:     Coloration: Skin is not pale.   Neurological:      Mental Status: She is alert and oriented to person, place, and time. "   Psychiatric:         Mood and Affect: Mood normal.         Behavior: Behavior normal.         Thought Content: Thought content normal.         Judgment: Judgment normal.         LABS    Lab Results - Last 18 Months   Lab Units 07/16/24  0746 05/16/24  1422 04/16/24  0836 01/18/24  0933 11/14/23  1239 11/14/23  0857 09/27/23  0902   HEMOGLOBIN g/dL 12.7 12.3 12.1 11.6* 12.0 12.1 11.7*   HEMATOCRIT % 38.0 36.1 36.1 34.8 36.4 37.0 36.4   MCV fL 87.6 86.4 86.6 87.0 88.6 88.5 89.4   WBC 10*3/mm3 9.19 9.58 8.25 7.96 7.65 7.74 7.41   RDW % 12.2* 12.4 12.5 12.3 12.2* 12.1* 12.6   MPV fL 9.4 9.5 9.6 9.3 9.6 9.3 9.4   PLATELETS 10*3/mm3 221 227 209 204 197 194 205   IMM GRAN % % 0.2  --  0.2 0.3 0.3 0.4 0.3   NEUTROS ABS 10*3/mm3 6.21  --  5.49 5.12 4.97 5.30 4.73   LYMPHS ABS 10*3/mm3 2.26  --  1.98 2.00 1.88 1.58 1.89   MONOS ABS 10*3/mm3 0.64  --  0.68 0.71 0.70 0.73 0.67   EOS ABS 10*3/mm3 0.02  --  0.04 0.06 0.04 0.05 0.06   BASOS ABS 10*3/mm3 0.04  --  0.04 0.05 0.04 0.05 0.04   IMMATURE GRANS (ABS) 10*3/mm3 0.02  --  0.02 0.02 0.02 0.03 0.02   NRBC /100 WBC 0.0  --  0.0 0.0 0.0 0.0 0.0       Lab Results - Last 18 Months   Lab Units 07/16/24  0746 05/16/24  1422 04/16/24  0836 01/18/24  0933 12/27/23  0921 11/14/23  1239 11/14/23  0857 09/27/23  0902 08/16/23  0802   GLUCOSE mg/dL 81 129* 95 100*  --  89 97 69 101*   SODIUM mmol/L 140 140 141 140  --  142 139 141 140   POTASSIUM mmol/L 3.8 4.2 4.0 3.9  --  3.9 4.4 3.8 4.1   CO2 mmol/L 29.0 30.0* 26.0 26.0  --  30.0* 30.0* 27.0 28.0   CHLORIDE mmol/L 102 101 104 104  --  102 103 104 104   ANION GAP mmol/L 9.0 9.0 11.0 10.0  --  10.0 6.0 10.0 8.0   CREATININE mg/dL 1.35* 1.25* 1.46* 1.25* 1.40* 1.36* 1.27* 1.39* 1.50*   BUN mg/dL 22 17 29* 23  --  23 22 20 24*   BUN / CREAT RATIO  16.3 13.6 19.9 18.4  --  16.9 17.3 14.4 16.0   CALCIUM mg/dL 10.3 10.2 10.0 9.7  --  9.7 9.8 10.0 10.4   ALK PHOS U/L 113  --  93 78  --   --  75 69 71   TOTAL PROTEIN g/dL 7.3  --  7.1 6.9   "--   --  7.1 6.8 7.2   ALT (SGPT) U/L 15  --  15 18  --   --  14 10 17   AST (SGOT) U/L 18  --  19 19  --   --  17 16 16   BILIRUBIN mg/dL 0.3  --  0.4 0.3  --   --  0.2 0.3 0.2   ALBUMIN g/dL 4.2  --  4.4 4.1  --   --  4.3 4.1 4.4   GLOBULIN gm/dL 3.1  --  2.7 2.8  --   --  2.8 2.7 2.8       No results for input(s): \"MSPIKE\", \"KAPPALAMB\", \"IGLFLC\", \"URICACID\", \"FREEKAPPAL\", \"CEA\", \"LDH\", \"REFLABREPO\" in the last [id number] hours.    Lab Results - Last 18 Months   Lab Units 06/21/23  0750   IRON mcg/dL 76   TIBC mcg/dL 325   IRON SATURATION (TSAT) % 23   FERRITIN ng/mL 262.30*       Tiff Elizalde reports a pain score of 0.        ASSESSMENT:  1.  Ovarian cancer, low-grade serous carcinoma, left.  Tumor size 18 cm. Negative genetic abnormality.   AJCC stage:IIIA1 (pT3, pN1, cM0, G1)  Treatment status: Postadjuvant paclitaxel and carboplatin 01/10/2023 through 4/28/2023, 6 cycles.  She is taking adjuvant letrozole 2.5 mg 6/2023 through present by Dr. Hayward.  2.  Performance status of 0.  3.  Left deep venous thrombosis from malignancy.  She is taking apixaban.  4.  Pulmonary embolism from malignancy.  Undergoing treatment with apixaban.  Post IVC filter 11/22/2022.  Unable to remove IVC filter on multiple attempts on 11/17/2023.  5.  Anemia from iron deficiency and chronic kidney disease stage IIIa, GFR 59.3 ml/min on 2/3/2023. Oral iron 01/10/2023 through 6/21/2023.    6.  Hypersensitivity reaction to paclitaxel on 01/09/2023. Responded to dexamethasone as premed.   7.  Grade 1 neuropathy. Under observation.  Patient is off gabapentin.    8.  RIGHT lower lobe 3 mm pulmonary nodule. On observation.  9.  RIGHT thyroid with a 4 cm heterogeneous nodule.  Management per primary care.  10. Nodule located in the segment 4 a of the liver measures 7 mm on 5/12/2023, MRI abdomen on 6/12/2023 showed benign cyst.  11.  Osteopenia.  She is taking calcium and D.               PLAN:  1.   Re: Venous Doppler on 7/16/2024. No " "evidence of acute DVT. Postthrombotic scarring within the left common femoral vein and proximal superficial femoral vein with mural thrombus/thickening. There is otherwise normal flow within these vessels. FINDINGS are similar to the previous exam.  Port removed on 5/21/2024.  2.   Re: Tolerance to letrozole. \"It's fine.\"  3.   Re: Heme status.  WBC 9.1, hemoglobin 12.7, hematocrit 38, MCV 87.6 and platelet 221.   4.   Re: CMP.  GFR 45.1 from 41 from 49.4 from 44.7 from 48.5 from 43.8 from 40 from 47.9 mL per minute   5.   Re:  at 37.2 from 33.6 from 32.3 from 35 from 38.9 from 43 from 33.    6.   Re: CT chest report 7/16/2024.No new suspicious finding. Similar small pulmonary nodules compared to prior. Similar large RIGHT thyroid nodule. Sent to PCP.   7.   Re:  Stable for observation, ovarian carcinoma.   8.  Schedule CT of the chest without IV contrast 10/2024 to follow the 3 mm right lower lobe lung nodule.   9.  Discontinue port flushes.  10.  eRx for ondansetron 8 mg p.o. every 8 hours as needed for nausea and vomiting #60, 2 refills if needed.  11.  eRx gabapentin 300 mg po three times daily, 90, 2 refills if needed.  Monitor for dizziness, hypotension or hypersomnolence.  12.  eRx letrozole 2.5 mg po daily, 90, 3 refills if needed.  Monitor for bone loss or hot flashes.   13.  Continue ongoing management per primary care physician and the other specialists.  14.  Plan of care discussed with patient.  Understanding expressed.  The patient is agreeable to proceed.  15. Questions were answered to her satisfaction. \"Yes.\"  16.  She will be seen every 2 to 4 months for the first 2 years then every 3 to 6 months for the next 3 years.  Annually after 5 years.  Imaging as clinically indicated.  17.  Return to office in 12 weeks with CBC with differential, CMP, , and pre office left leg venous doppler.   18.  Referral to nephrology per PCP.  \"Dr. Garza is " "watching it.\"  To see Dr. Hayward 9/204.            I have reviewed the assessment and plan and verified the accuracy of it. No changes to assessment and plan since the information was documented. Walker Magana MD 08/16/24         I spent 31 total minutes, face-to-face, caring for Tiff today. Greater than 50% of this time involved counseling and/or coordination of care as documented within this note.               Virgilio Hayward MD  (Abdi Hills MD)  Tyrone Garza MD    "

## 2024-08-16 NOTE — LETTER
August 16, 2024       No Recipients    Patient: Tiff Elizalde   YOB: 1965   Date of Visit: 8/16/2024     Dear Tyrone Garza MD:       Thank you for referring Tiff Elizalde to me for evaluation. Below are the relevant portions of my assessment and plan of care.    If you have questions, please do not hesitate to call me. I look forward to following Tiff along with you.         Sincerely,        Walker Magana MD        CC:   No Recipients    Walker Magana MD  08/16/24 0855  Incomplete  [facility] HEMATOLOGY & ONCOLOGY  [address]  [facility] [address] [phone]    Patient Name: Tiff Elizalde  Encounter Date: 08/16/2024  YOB: 1965  Patient Number: [id number]      REASON FOR FOLLOW-UP: Tiff Elizalde is a pleasant 58 y.o.  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 She is seen 15.5 months post 6 cycles of adjuvant paclitaxel and carboplatin.  She had hypersensitivity reaction to paclitaxel on C1D1.  She is taking adjuvant letrozole since 6/2023 through present. The patient is seen alone.  History is obtained from patient.  History is considered reliable.           Oncology/Hematology History Overview Note   DIAGNOSTIC ABNORMALITIES:   Pain lower back and left leg swelling.  She presented to her PCP.   CT scan done at [facility]. Details not available.  Patient seen by Dr. Virgilio Hayward 11/23/2022. Patient found to have a large pelvic mass causing bilateral hydronephrosis with resultant nonfunctioning left kidney, elevated creatinine, left deep venous thrombosis and pulmonary embolus.  Pathology report 11/28/2022.  Fallopian tube and ovary, left salpingo-oophorectomy: Fallopian tube with no evidence of involvement by malignancy.  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  No surface  involvement demonstrated.  Uterus, fallopian tube and ovary, hysterectomy with right salpingo-oophorectomy: Cervix and endocervix with no evidence of squamous or glandular dysplasia.  Chronic cervicitis present.  Weakly proliferative endometrium, negative for hyperplasia, carcinoma, and endometritis.  Benign endometrial polyp present.  Adenomyosis.  No leiomyomata.  Right fallopian tube with focal involvement by serous neoplasm with Samona by this most consistent with low-grade serous carcinoma.  Remnant left ovary with adhesions to uterine serosa and focal involvement by low-grade serous carcinoma.  Right ovary with surface involvement by low-grade serous carcinoma.  Omentum biopsy: Involved by low-grade serous carcinoma, invasive).  Sidewall, left pelvic biopsy: Edema with hemorrhage and chronic inflammation, negative for endometriosis and malignancy.  Lymph node left pelvic biopsy: 1 lymph node with a 0.9 mm focus of metastatic serous carcinoma surrounding adipose tissue with hemorrhage and edema.           PREVIOUS INTERVENTIONS:  IVC filter was placed 11/22/2022 at [place].  She had undergone exploratory laparotomy, total abdominal hysterectomy, bilateral salpingo-oophorectomy, debulking, left pelvic lymph node dissection and omentectomy by Dr. Hayward on 11/23/2022.  Estimated blood loss 400 ml.  Laparotomy showed a massive mass arising from the left ovary, retroperitoneal, infiltrating the retroperitoneal space and adherent to the sigmoid mesentery.  Within the retroperitoneum it was densely adherent to the iliac vessel on the left side and ureter.  Deliberate rupture of the mass was required in order to access the retroperitoneal attachment.  The mass was sent for frozen section and determined to be at least borderline tumor possibly low-grade serous tumor.  There was no other evidence of disease in the pelvis or upper abdomen.  Due to being densely adherent to the retroperitoneum and iliac vessels, it is  unclear whether the entire tumor was removed or not and the pelvic sidewall biopsy was taken to determine whether the residual ovary was present and not a fibrotic reaction.  Bilateral ureterolysis was required.   Hypersensitivity reaction to Taxol 01/09/2023.   Adjuvant paclitaxel and carboplatin 01/10/2023 through 4/28/2023, 6 cycles.   Adjuvant letrozole on 6/2023 through present.     Ovarian cancer   12/15/2022 Initial Diagnosis    Ovarian cancer (HCC)     1/9/2023 - 4/28/2023 Chemotherapy    OP OVARIAN PACLitaxel / CARBOplatin (Q21D)     1/9/2023 -  Chemotherapy    OP CENTRAL VENOUS ACCESS DEVICE ACCESS, CARE, AND MAINTENANCE (CVAD)     1/20/2023 Cancer Staged    Staging form: Ovary, Fallopian Tube, And Primary Peritoneal Carcinoma, AJCC 8th Edition  - Pathologic stage from 1/20/2023: FIGO Stage III (pT3, pN1, cM0) - Signed by Walker Magana MD on 1/20/2023     2/15/2023 - 2/15/2023 Chemotherapy    OP MAGNESIUM          PAST MEDICAL HISTORY:  ALLERGIES:  No Known Allergies  CURRENT MEDICATIONS:  Outpatient Encounter Medications as of 8/16/2024   Medication Sig Dispense Refill   • Calcium Carbonate-Vit D-Min (CALCIUM 1200 PO) Take 1 tablet by mouth Daily.     • Eliquis 5 MG tablet tablet 1 tablet Every 12 (Twelve) Hours.     • letrozole (FEMARA) 2.5 MG tablet Take 1 tablet by mouth Daily.     • lidocaine-prilocaine (EMLA) 2.5-2.5 % cream APPLY TOPICALLY TO PORT SITE 30 MINUTES PRIOR TO BEING ACCESSED 30 g 0   • lisinopril-hydrochlorothiazide (PRINZIDE,ZESTORETIC) 20-12.5 MG per tablet Take 1 tablet by mouth Daily.     • vitamin D3 125 MCG (5000 UT) capsule capsule Take  by mouth Daily.       No facility-administered encounter medications on file as of 8/16/2024.     ADULT ILLNESSES:  Patient Active Problem List   Diagnosis Code   • Ovarian cancer C56.9     SURGERIES:  Past Surgical History:   Procedure Laterality Date   • HYSTERECTOMY     • TONSILLECTOMY     • TUMOR REMOVAL Left 11/2022   • VENA CAVA FILTER  INSERTION     • VENA CAVA FILTER REMOVAL N/A 11/17/2023    Procedure: ATTEMPTED VENA CAVA FILTER REMOVAL;  Surgeon: All Adorno DO;  Location: [facility] HYBRID OR 12;  Service: Vascular;  Laterality: N/A;   • VENOUS ACCESS DEVICE (PORT) INSERTION N/A 12/30/2022    Procedure: INSERTION VENOUS ACCESS DEVICE;  Surgeon: Holley Mcgill MD;  Location:  [facility] OR;  Service: General;  Laterality: N/A;   • VENOUS ACCESS DEVICE (PORT) REMOVAL N/A 5/21/2024    Procedure: REMOVAL VENOUS ACCESS DEVICE;  Surgeon: Abdi Hills MD;  Location:  [facility] OR;  Service: General;  Laterality: N/A;     HEALTH MAINTENANCE ITEMS:  Health Maintenance Due   Topic Date Due   • MAMMOGRAM  Never done   • BMI FOLLOWUP  Never done   • COLORECTAL CANCER SCREENING  Never done   • TDAP/TD VACCINES (2 - Tdap) 02/24/2008   • ZOSTER VACCINE (1 of 2) Never done   • HEPATITIS C SCREENING  Never done   • ANNUAL PHYSICAL  Never done   • COVID-19 Vaccine (4 - 2023-24 season) 09/01/2023   • INFLUENZA VACCINE  08/01/2024       <no information>  Last Completed Colonoscopy       This patient has no relevant Health Maintenance data.          Immunization History   Administered Date(s) Administered   • COVID-19 (MODERNA) 1st,2nd,3rd Dose Monovalent 07/29/2021, 08/26/2021   • COVID-19 (MODERNA) Monovalent Original Booster 01/27/2022     Last Completed Mammogram       This patient has no relevant Health Maintenance data.              FAMILY HISTORY:  Family History   Problem Relation Age of Onset   • Hypertension Mother    • Multiple sclerosis Father    • Heart attack Maternal Grandfather    • Diabetes Paternal Grandmother    • Heart attack Paternal Grandfather      SOCIAL HISTORY:  Social History     Socioeconomic History   • Marital status:    Tobacco Use   • Smoking status: Never   • Smokeless tobacco: Never   Vaping Use   • Vaping status: Never Used   Substance and Sexual Activity   • Alcohol use: Never   • Drug use: Never   • Sexual activity: Defer  "      REVIEW OF SYSTEMS:    Review of Systems   Constitutional:  Negative for fatigue, fever and unexpected weight change.        \"I feel good.\"   HENT:  Negative for congestion and mouth sores.    Eyes:  Negative for discharge and redness.   Respiratory:  Negative for shortness of breath and wheezing.    Cardiovascular:  Negative for chest pain and leg swelling.   Gastrointestinal:  Negative for constipation, diarrhea, nausea and vomiting.   Endocrine: Negative for cold intolerance and heat intolerance.   Genitourinary:  Negative for dysuria and vaginal bleeding.   Musculoskeletal:  Negative for gait problem and myalgias.   Skin:  Negative for pallor.   Allergic/Immunologic: Negative for food allergies.   Neurological:  Negative for dizziness and speech difficulty.        \"My toes still tingle. My hands are fine.\"   Hematological:  Negative for adenopathy. Does not bruise/bleed easily.   Psychiatric/Behavioral:  Negative for agitation, confusion and hallucinations.          VITAL SIGNS: /74   Pulse 86   Temp 97.4 °F (36.3 °C)   Resp 18   Ht 169.4 cm (66.69\")   Wt 91.2 kg (201 lb)   SpO2 96%   Breastfeeding No   BMI 31.77 kg/m²  Gained 4 pounds.   Pain Score    08/16/24 0837   PainSc: 0-No pain       PHYSICAL EXAMINATION:     Physical Exam  Vitals reviewed.   Constitutional:       General: She is not in acute distress.  HENT:      Head: Normocephalic and atraumatic.   Eyes:      General: No scleral icterus.  Cardiovascular:      Rate and Rhythm: Normal rate.   Pulmonary:      Effort: No respiratory distress.      Breath sounds: No wheezing.      Comments: Port, left, removed.  Abdominal:      General: Bowel sounds are normal. There is no distension.      Palpations: Abdomen is soft.   Musculoskeletal:         General: No swelling.      Cervical back: Neck supple.   Skin:     Coloration: Skin is not pale.   Neurological:      Mental Status: She is alert and oriented to person, place, and time. "   Psychiatric:         Mood and Affect: Mood normal.         Behavior: Behavior normal.         Thought Content: Thought content normal.         Judgment: Judgment normal.         LABS    Lab Results - Last 18 Months   Lab Units 07/16/24  0746 05/16/24  1422 04/16/24  0836 01/18/24  0933 11/14/23  1239 11/14/23  0857 09/27/23  0902   HEMOGLOBIN g/dL 12.7 12.3 12.1 11.6* 12.0 12.1 11.7*   HEMATOCRIT % 38.0 36.1 36.1 34.8 36.4 37.0 36.4   MCV fL 87.6 86.4 86.6 87.0 88.6 88.5 89.4   WBC 10*3/mm3 9.19 9.58 8.25 7.96 7.65 7.74 7.41   RDW % 12.2* 12.4 12.5 12.3 12.2* 12.1* 12.6   MPV fL 9.4 9.5 9.6 9.3 9.6 9.3 9.4   PLATELETS 10*3/mm3 221 227 209 204 197 194 205   IMM GRAN % % 0.2  --  0.2 0.3 0.3 0.4 0.3   NEUTROS ABS 10*3/mm3 6.21  --  5.49 5.12 4.97 5.30 4.73   LYMPHS ABS 10*3/mm3 2.26  --  1.98 2.00 1.88 1.58 1.89   MONOS ABS 10*3/mm3 0.64  --  0.68 0.71 0.70 0.73 0.67   EOS ABS 10*3/mm3 0.02  --  0.04 0.06 0.04 0.05 0.06   BASOS ABS 10*3/mm3 0.04  --  0.04 0.05 0.04 0.05 0.04   IMMATURE GRANS (ABS) 10*3/mm3 0.02  --  0.02 0.02 0.02 0.03 0.02   NRBC /100 WBC 0.0  --  0.0 0.0 0.0 0.0 0.0       Lab Results - Last 18 Months   Lab Units 07/16/24  0746 05/16/24  1422 04/16/24  0836 01/18/24  0933 12/27/23  0921 11/14/23  1239 11/14/23  0857 09/27/23  0902 08/16/23  0802   GLUCOSE mg/dL 81 129* 95 100*  --  89 97 69 101*   SODIUM mmol/L 140 140 141 140  --  142 139 141 140   POTASSIUM mmol/L 3.8 4.2 4.0 3.9  --  3.9 4.4 3.8 4.1   CO2 mmol/L 29.0 30.0* 26.0 26.0  --  30.0* 30.0* 27.0 28.0   CHLORIDE mmol/L 102 101 104 104  --  102 103 104 104   ANION GAP mmol/L 9.0 9.0 11.0 10.0  --  10.0 6.0 10.0 8.0   CREATININE mg/dL 1.35* 1.25* 1.46* 1.25* 1.40* 1.36* 1.27* 1.39* 1.50*   BUN mg/dL 22 17 29* 23  --  23 22 20 24*   BUN / CREAT RATIO  16.3 13.6 19.9 18.4  --  16.9 17.3 14.4 16.0   CALCIUM mg/dL 10.3 10.2 10.0 9.7  --  9.7 9.8 10.0 10.4   ALK PHOS U/L 113  --  93 78  --   --  75 69 71   TOTAL PROTEIN g/dL 7.3  --  7.1 6.9   "--   --  7.1 6.8 7.2   ALT (SGPT) U/L 15  --  15 18  --   --  14 10 17   AST (SGOT) U/L 18  --  19 19  --   --  17 16 16   BILIRUBIN mg/dL 0.3  --  0.4 0.3  --   --  0.2 0.3 0.2   ALBUMIN g/dL 4.2  --  4.4 4.1  --   --  4.3 4.1 4.4   GLOBULIN gm/dL 3.1  --  2.7 2.8  --   --  2.8 2.7 2.8       No results for input(s): \"MSPIKE\", \"KAPPALAMB\", \"IGLFLC\", \"URICACID\", \"FREEKAPPAL\", \"CEA\", \"LDH\", \"REFLABREPO\" in the last [id number] hours.    Lab Results - Last 18 Months   Lab Units 06/21/23  0750   IRON mcg/dL 76   TIBC mcg/dL 325   IRON SATURATION (TSAT) % 23   FERRITIN ng/mL 262.30*       Tiff Elizalde reports a pain score of 0.        ASSESSMENT:  1.  Ovarian cancer, low-grade serous carcinoma, left.  Tumor size 18 cm. Negative genetic abnormality.   AJCC stage:IIIA1 (pT3, pN1, cM0, G1)  Treatment status: Postadjuvant paclitaxel and carboplatin 01/10/2023 through 4/28/2023, 6 cycles.  She is taking adjuvant letrozole 2.5 mg 6/2023 through present by Dr. Haywrad.  2.  Performance status of 0.  3.  Left deep venous thrombosis from malignancy.  She is taking apixaban.  4.  Pulmonary embolism from malignancy.  Undergoing treatment with apixaban.  Post IVC filter 11/22/2022.  Unable to remove IVC filter on multiple attempts on 11/17/2023.  5.  Anemia from iron deficiency and chronic kidney disease stage IIIa, GFR 59.3 ml/min on 2/3/2023. Oral iron 01/10/2023 through 6/21/2023.    6.  Hypersensitivity reaction to paclitaxel on 01/09/2023. Responded to dexamethasone as premed.   7.  Grade 1 neuropathy. Under observation.  Patient is off gabapentin.    8.  RIGHT lower lobe 3 mm pulmonary nodule. On observation.  9.  RIGHT thyroid with a 4 cm heterogeneous nodule.  Management per primary care.  10. Nodule located in the segment 4 a of the liver measures 7 mm on 5/12/2023, MRI abdomen on 6/12/2023 showed benign cyst.  11.  Osteopenia.  She is taking calcium and D.               PLAN:  1.   Re: Venous Doppler on 7/16/2024. No " "evidence of acute DVT. Postthrombotic scarring within the left common femoral vein and proximal superficial femoral vein with mural thrombus/thickening. There is otherwise normal flow within these vessels. FINDINGS are similar to the previous exam.  Port removed on 5/21/2024.  2.   Re: Tolerance to letrozole. \"It's fine.\"  3.   Re: Heme status.  WBC 9.1, hemoglobin 12.7, hematocrit 38, MCV 87.6 and platelet 221.   4.   Re: CMP.  GFR 45.1 from 41 from 49.4 from 44.7 from 48.5 from 43.8 from 40 from 47.9 mL per minute   5.   Re:  at 37.2 from 33.6 from 32.3 from 35 from 38.9 from 43 from 33.    6.   Re: CT chest report 7/16/2024.No new suspicious finding. Similar small pulmonary nodules compared to prior. Similar large RIGHT thyroid nodule. Sent to PCP.   7.   Re:  Stable for observation, ovarian carcinoma.   8.  Schedule CT of the chest without IV contrast 10/2024 to follow the 3 mm right lower lobe lung nodule.   9.  Discontinue port flushes.  10.  eRx for ondansetron 8 mg p.o. every 8 hours as needed for nausea and vomiting #60, 2 refills if needed.  11.  eRx gabapentin 300 mg po three times daily, 90, 2 refills if needed.  Monitor for dizziness, hypotension or hypersomnolence.  12.  eRx letrozole 2.5 mg po daily, 90, 3 refills if needed.  Monitor for bone loss or hot flashes.   13.  Continue ongoing management per primary care physician and the other specialists.  14.  Plan of care discussed with patient.  Understanding expressed.  The patient is agreeable to proceed.  15. Questions were answered to her satisfaction. \"Yes.\"  16.  She will be seen every 2 to 4 months for the first 2 years then every 3 to 6 months for the next 3 years.  Annually after 5 years.  Imaging as clinically indicated.  17.  Return to office in 12 weeks with CBC with differential, CMP, , and pre office left leg venous doppler.   18.  Referral to nephrology per PCP.  \"Dr. Garza is " "watching it.\"  To see Dr. Hayward 9/204.            I have reviewed the assessment and plan and verified the accuracy of it. No changes to assessment and plan since the information was documented. Walker Magana MD 08/16/24         I spent *** +     16    total minutes, face-to-face, caring for Tiff today. Greater than 50% of this time involved counseling and/or coordination of care as documented within this note.               Virgilio Hayward MD  (Abdi Hills MD)  MD [place], MD  08/08/24 1058  Sign when Signing Visit  [facility] HEMATOLOGY & ONCOLOGY  [address]  [facility] [id number] [phone]    Patient Name: Tiff Elizalde  Encounter Date: 08/16/2024  YOB: 1965  Patient Number: [id number]      REASON FOR FOLLOW-UP:  Tiff Elizalde is a pleasant 58 y.o.  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 She is seen 15.5 months post 6 cycles of adjuvant paclitaxel and carboplatin.  She had hypersensitivity reaction to paclitaxel on C1D1.  She is taking adjuvant letrozole since 6/2023 through present. The patient is seen with her spouse, Dennis. History is obtained from patient.  History is considered reliable.           Oncology/Hematology History Overview Note   DIAGNOSTIC ABNORMALITIES:   Pain lower back and left leg swelling.  She presented to her PCP.   [facility] scan done at [facility]. Details not available.  Patient seen by Dr. Virgilio Hayward 11/23/2022. Patient found to have a large pelvic mass causing bilateral hydronephrosis with resultant nonfunctioning left kidney, elevated creatinine, left deep venous thrombosis and pulmonary embolus.  Pathology report 11/28/2022.  Fallopian tube and ovary, left salpingo-oophorectomy: Fallopian tube with no evidence of involvement by malignancy.  18 cm serous carcinoma, low-grade arising in a background " of borderline serous tumor.  No surface involvement demonstrated.  Uterus, fallopian tube and ovary, hysterectomy with right salpingo-oophorectomy: Cervix and endocervix with no evidence of squamous or glandular dysplasia.  Chronic cervicitis present.  Weakly proliferative endometrium, negative for hyperplasia, carcinoma, and endometritis.  Benign endometrial polyp present.  Adenomyosis.  No leiomyomata.  Right fallopian tube with focal involvement by serous neoplasm with Samona by this most consistent with low-grade serous carcinoma.  Remnant left ovary with adhesions to uterine serosa and focal involvement by low-grade serous carcinoma.  Right ovary with surface involvement by low-grade serous carcinoma.  Omentum biopsy: Involved by low-grade serous carcinoma, invasive).  Sidewall, left pelvic biopsy: Edema with hemorrhage and chronic inflammation, negative for endometriosis and malignancy.  Lymph node left pelvic biopsy: 1 lymph node with a 0.9 mm focus of metastatic serous carcinoma surrounding adipose tissue with hemorrhage and edema.           PREVIOUS INTERVENTIONS:  IVC filter was placed 11/22/2022 at [place].  She had undergone exploratory laparotomy, total abdominal hysterectomy, bilateral salpingo-oophorectomy, debulking, left pelvic lymph node dissection and omentectomy by Dr. Hayward on 11/23/2022.  Estimated blood loss 400 ml.  Laparotomy showed a massive mass arising from the left ovary, retroperitoneal, infiltrating the retroperitoneal space and adherent to the sigmoid mesentery.  Within the retroperitoneum it was densely adherent to the iliac vessel on the left side and ureter.  Deliberate rupture of the mass was required in order to access the retroperitoneal attachment.  The mass was sent for frozen section and determined to be at least borderline tumor possibly low-grade serous tumor.  There was no other evidence of disease in the pelvis or upper abdomen.  Due to being densely adherent to the  retroperitoneum and iliac vessels, it is unclear whether the entire tumor was removed or not and the pelvic sidewall biopsy was taken to determine whether the residual ovary was present and not a fibrotic reaction.  Bilateral ureterolysis was required.   Hypersensitivity reaction to Taxol 01/09/2023.   Adjuvant paclitaxel and carboplatin 01/10/2023 through 4/28/2023, 6 cycles.   Adjuvant letrozole on 6/2023 through present.     Ovarian cancer   12/15/2022 Initial Diagnosis    Ovarian cancer (HCC)     1/9/2023 - 4/28/2023 Chemotherapy    OP OVARIAN PACLitaxel / CARBOplatin (Q21D)     1/9/2023 -  Chemotherapy    OP CENTRAL VENOUS ACCESS DEVICE ACCESS, CARE, AND MAINTENANCE (CVAD)     1/20/2023 Cancer Staged    Staging form: Ovary, Fallopian Tube, And Primary Peritoneal Carcinoma, AJCC 8th Edition  - Pathologic stage from 1/20/2023: FIGO Stage III (pT3, pN1, cM0) - Signed by Walker Magana MD on 1/20/2023     2/15/2023 - 2/15/2023 Chemotherapy    OP MAGNESIUM          PAST MEDICAL HISTORY:  ALLERGIES:  No Known Allergies  CURRENT MEDICATIONS:  Outpatient Encounter Medications as of 8/16/2024   Medication Sig Dispense Refill   • Calcium Carbonate-Vit D-Min (CALCIUM 1200 PO) Take 1 tablet by mouth Daily.     • Eliquis 5 MG tablet tablet 1 tablet Every 12 (Twelve) Hours.     • letrozole (FEMARA) 2.5 MG tablet Take 1 tablet by mouth Daily.     • lidocaine-prilocaine (EMLA) 2.5-2.5 % cream APPLY TOPICALLY TO PORT SITE 30 MINUTES PRIOR TO BEING ACCESSED 30 g 0   • lisinopril-hydrochlorothiazide (PRINZIDE,ZESTORETIC) 20-12.5 MG per tablet Take 1 tablet by mouth Daily.     • vitamin D3 125 MCG (5000 UT) capsule capsule Take  by mouth Daily.       No facility-administered encounter medications on file as of 8/16/2024.     ADULT ILLNESSES:  Patient Active Problem List   Diagnosis Code   • Ovarian cancer C56.9     SURGERIES:  Past Surgical History:   Procedure Laterality Date   • HYSTERECTOMY     • TONSILLECTOMY     • TUMOR  REMOVAL Left 11/2022   • VENA CAVA FILTER INSERTION     • VENA CAVA FILTER REMOVAL N/A 11/17/2023    Procedure: ATTEMPTED VENA CAVA FILTER REMOVAL;  Surgeon: All Adorno DO;  Location:  [facility] HYBRID OR 12;  Service: Vascular;  Laterality: N/A;   • VENOUS ACCESS DEVICE (PORT) INSERTION N/A 12/30/2022    Procedure: INSERTION VENOUS ACCESS DEVICE;  Surgeon: Holley Mcgill MD;  Location:  [facility] OR;  Service: General;  Laterality: N/A;   • VENOUS ACCESS DEVICE (PORT) REMOVAL N/A 5/21/2024    Procedure: REMOVAL VENOUS ACCESS DEVICE;  Surgeon: Abdi Hills MD;  Location:  [facility] OR;  Service: General;  Laterality: N/A;     HEALTH MAINTENANCE ITEMS:  Health Maintenance Due   Topic Date Due   • MAMMOGRAM  Never done   • BMI FOLLOWUP  Never done   • COLORECTAL CANCER SCREENING  Never done   • TDAP/TD VACCINES (2 - Tdap) 02/24/2008   • ZOSTER VACCINE (1 of 2) Never done   • HEPATITIS C SCREENING  Never done   • ANNUAL PHYSICAL  Never done   • COVID-19 Vaccine (4 - 2023-24 season) 09/01/2023   • INFLUENZA VACCINE  08/01/2024       <no information>  Last Completed Colonoscopy       This patient has no relevant Health Maintenance data.          Immunization History   Administered Date(s) Administered   • COVID-19 (MODERNA) 1st,2nd,3rd Dose Monovalent 07/29/2021, 08/26/2021   • COVID-19 (MODERNA) Monovalent Original Booster 01/27/2022     Last Completed Mammogram       This patient has no relevant Health Maintenance data.              FAMILY HISTORY:  Family History   Problem Relation Age of Onset   • Hypertension Mother    • Multiple sclerosis Father    • Heart attack Maternal Grandfather    • Diabetes Paternal Grandmother    • Heart attack Paternal Grandfather      SOCIAL HISTORY:  Social History     Socioeconomic History   • Marital status:    Tobacco Use   • Smoking status: Never   • Smokeless tobacco: Never   Vaping Use   • Vaping status: Never Used   Substance and Sexual Activity   • Alcohol use: Never   •  Drug use: Never   • Sexual activity: Defer       REVIEW OF SYSTEMS:    Review of Systems    VITAL SIGNS: There were no vitals taken for this visit.  There were no vitals filed for this visit.    PHYSICAL EXAMINATION:     Physical Exam    LABS    Lab Results - Last 18 Months   Lab Units 07/16/24  0746 05/16/24 1422 04/16/24  0836 01/18/24  0933 11/14/23  1239 11/14/23  0857 09/27/23  0902   HEMOGLOBIN g/dL 12.7 12.3 12.1 11.6* 12.0 12.1 11.7*   HEMATOCRIT % 38.0 36.1 36.1 34.8 36.4 37.0 36.4   MCV fL 87.6 86.4 86.6 87.0 88.6 88.5 89.4   WBC 10*3/mm3 9.19 9.58 8.25 7.96 7.65 7.74 7.41   RDW % 12.2* 12.4 12.5 12.3 12.2* 12.1* 12.6   MPV fL 9.4 9.5 9.6 9.3 9.6 9.3 9.4   PLATELETS 10*3/mm3 221 227 209 204 197 194 205   IMM GRAN % % 0.2  --  0.2 0.3 0.3 0.4 0.3   NEUTROS ABS 10*3/mm3 6.21  --  5.49 5.12 4.97 5.30 4.73   LYMPHS ABS 10*3/mm3 2.26  --  1.98 2.00 1.88 1.58 1.89   MONOS ABS 10*3/mm3 0.64  --  0.68 0.71 0.70 0.73 0.67   EOS ABS 10*3/mm3 0.02  --  0.04 0.06 0.04 0.05 0.06   BASOS ABS 10*3/mm3 0.04  --  0.04 0.05 0.04 0.05 0.04   IMMATURE GRANS (ABS) 10*3/mm3 0.02  --  0.02 0.02 0.02 0.03 0.02   NRBC /100 WBC 0.0  --  0.0 0.0 0.0 0.0 0.0       Lab Results - Last 18 Months   Lab Units 07/16/24  0746 05/16/24  1422 04/16/24  0836 01/18/24  0933 12/27/23  0921 11/14/23  1239 11/14/23  0857 09/27/23  0902 08/16/23  0802   GLUCOSE mg/dL 81 129* 95 100*  --  89 97 69 101*   SODIUM mmol/L 140 140 141 140  --  142 139 141 140   POTASSIUM mmol/L 3.8 4.2 4.0 3.9  --  3.9 4.4 3.8 4.1   CO2 mmol/L 29.0 30.0* 26.0 26.0  --  30.0* 30.0* 27.0 28.0   CHLORIDE mmol/L 102 101 104 104  --  102 103 104 104   ANION GAP mmol/L 9.0 9.0 11.0 10.0  --  10.0 6.0 10.0 8.0   CREATININE mg/dL 1.35* 1.25* 1.46* 1.25* 1.40* 1.36* 1.27* 1.39* 1.50*   BUN mg/dL 22 17 29* 23  --  23 22 20 24*   BUN / CREAT RATIO  16.3 13.6 19.9 18.4  --  16.9 17.3 14.4 16.0   CALCIUM mg/dL 10.3 10.2 10.0 9.7  --  9.7 9.8 10.0 10.4   ALK PHOS U/L 113  --  93 78   "--   --  75 69 71   TOTAL PROTEIN g/dL 7.3  --  7.1 6.9  --   --  7.1 6.8 7.2   ALT (SGPT) U/L 15  --  15 18  --   --  14 10 17   AST (SGOT) U/L 18  --  19 19  --   --  17 16 16   BILIRUBIN mg/dL 0.3  --  0.4 0.3  --   --  0.2 0.3 0.2   ALBUMIN g/dL 4.2  --  4.4 4.1  --   --  4.3 4.1 4.4   GLOBULIN gm/dL 3.1  --  2.7 2.8  --   --  2.8 2.7 2.8       No results for input(s): \"MSPIKE\", \"KAPPALAMB\", \"IGLFLC\", \"URICACID\", \"FREEKAPPAL\", \"CEA\", \"LDH\", \"REFLABREPO\" in the last 67104 hours.    Lab Results - Last 18 Months   Lab Units 06/21/23  0750   IRON mcg/dL 76   TIBC mcg/dL 325   IRON SATURATION (TSAT) % 23   FERRITIN ng/mL 262.30*       Tiff Elizalde reports a pain score of .  Given her pain assessment as noted, treatment options were discussed and the following options were decided upon as a follow-up plan to address the patient's pain: {[facility] PAIN ASSESSMENT FOLLOW-UP PLAN:[id number]}.      ASSESSMENT:  1.  Ovarian cancer, low-grade serous carcinoma, left.  Tumor size 18 cm. Negative genetic abnormality.   AJCC stage:IIIA1 (pT3, pN1, cM0, G1)  Treatment status: Postadjuvant paclitaxel and carboplatin 01/10/2023 through 4/28/2023, 6 cycles.  She is taking adjuvant letrozole 2.5 mg 6/2023 through present by Dr. Hayward.  2.  Performance status of ***0.  3.  Left deep venous thrombosis from malignancy.  She is taking apixaban.  4.  Pulmonary embolism from malignancy.  Undergoing treatment with apixaban.  Post IVC filter 11/22/2022.  Unable to remove IVC filter on multiple attempts on 11/17/2023.  5.  Anemia from iron deficiency and chronic kidney disease stage IIIa, GFR 59.3 ml/min on 2/3/2023. Oral iron 01/10/2023 through 6/21/2023.    6.  Hypersensitivity reaction to paclitaxel on 01/09/2023. Responded to dexamethasone as premed.   7.  Grade 1 neuropathy. Under observation.  Patient is ***off gabapentin.    8.  RIGHT lower lobe 3 mm pulmonary nodule. On observation.  9.  RIGHT thyroid with a 4 cm heterogeneous nodule.  " "Management per primary care.  10. Nodule located in the segment 4 a of the liver measures 7 mm on 5/12/2023, MRI abdomen on 6/12/2023 showed benign cyst.  11.  Osteopenia.  She is taking calcium and D.                 PLAN:  1.   Re: Venous Doppler on 7/16/2024. No evidence of acute DVT. Postthrombotic scarring within the left common femoral vein and proximal superficial femoral vein with mural thrombus/thickening. There is otherwise normal flow within these vessels. FINDINGS are similar to  the previous exam.  Port removed on 5/21/2024.  2.   Re: Tolerance to letrozole. \"***.\"  3.   Re: Heme status.  WBC 9.1, hemoglobin 12.7, hematocrit 38, MCV 87.6 and platelet 221.   4.   Re: CMP.  GFR 45.1 from 41 from 49.4 from 44.7 from 48.5 from 43.8 from 40 from 47.9 mL per minute   5.   Re:  at 37.2 from 33.6 from 32.3 from 35 from 38.9 from 43 from 33.    6.   Re: CT chest report 7/16/2024.No new suspicious finding. Similar small pulmonary nodules compared to prior. Similar large RIGHT thyroid nodule. Sent to PCP.   7.   Re:  Stable for observation, ovarian carcinoma.   8.  Schedule CT of the chest without IV contrast 10/2024 to follow the 3 mm right lower lobe lung nodule.   9.  Discontinue port flushes.  10.  eRx for ondansetron 8 mg p.o. every 8 hours as needed for nausea and vomiting #60, 2 refills if needed.  11.  eRx gabapentin 300 mg po three times daily, 90, 2 refills if needed.  Monitor for dizziness, hypotension or hypersomnolence.  12.  eRx letrozole 2.5 mg po daily, 90, 3 refills if needed.  Monitor for bone loss or hot flashes.   13.  Continue ongoing management per primary care physician and the other specialists.  14.  Plan of care discussed with patient and her spouse Dennis.  Understanding expressed.  The patient is agreeable to proceed.  15. Questions were answered to her satisfaction. \"***.\"  16.  She will be seen every 2 to 4 months for the first 2 " "years then every 3 to 6 months for the next 3 years.  Annually after 5 years.  Imaging as clinically indicated.  17.  Return to office in 12 weeks with CBC with differential, CMP, , and pre office left leg venous doppler.   18.  Referral to nephrology per PCP.                     I spent *** +     16    total minutes, face-to-face, caring for Tiff today. {Time Percentage:72523::\"Greater than 50%\"} of this time involved counseling and/or coordination of care as documented within this note.               Virgilio Hayward MD  (Abdi Hills MD)  Tyrone Garza MD    "

## 2024-10-25 ENCOUNTER — HOSPITAL ENCOUNTER (OUTPATIENT)
Dept: CT IMAGING | Facility: HOSPITAL | Age: 59
Discharge: HOME | End: 2024-10-25
Admitting: INTERNAL MEDICINE
Payer: COMMERCIAL

## 2024-10-25 DIAGNOSIS — R91.1 LUNG NODULE SEEN ON IMAGING STUDY: ICD-10-CM

## 2024-10-25 PROCEDURE — 71250 CT THORAX DX C-: CPT

## 2024-11-04 NOTE — PROGRESS NOTES
[facility]  [address]  [place] [id number] [phone]    Patient Name: Tiff Elizalde  Encounter Date: 11/15/2024  YOB: 1965  Patient Number: [id number]      REASON FOR FOLLOW-UP: Tiff Elizalde is a pleasant 59 y.o.  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 She is seen 18.5 months post 6 cycles of adjuvant paclitaxel and carboplatin.  She had hypersensitivity reaction to paclitaxel on C1D1.  Patient on therapy with adjuvant letrozole since 6/2023 through present. The patient is seen alone.  History is obtained from patient.  She is a reliable historian.         Oncology/Hematology History Overview Note   DIAGNOSTIC ABNORMALITIES:   Pain lower back and left leg swelling.  She presented to her PCP.   CT scan done at [facility]. Details not available.  Patient seen by Dr. Virgilio Hayward 11/23/2022. Patient found to have a large pelvic mass causing bilateral hydronephrosis with resultant nonfunctioning left kidney, elevated creatinine, left deep venous thrombosis and pulmonary embolus.  Pathology report 11/28/2022.  Fallopian tube and ovary, left salpingo-oophorectomy: Fallopian tube with no evidence of involvement by malignancy.  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  No surface involvement demonstrated.  Uterus, fallopian tube and ovary, hysterectomy with right salpingo-oophorectomy: Cervix and endocervix with no evidence of squamous or glandular dysplasia.  Chronic cervicitis present.  Weakly proliferative endometrium, negative for hyperplasia, carcinoma, and endometritis.  Benign endometrial polyp present.  Adenomyosis.  No leiomyomata.  Right fallopian tube with focal involvement by serous neoplasm with Samona by this most consistent with low-grade serous carcinoma.  Remnant left ovary  with adhesions to uterine serosa and focal involvement by low-grade serous carcinoma.  Right ovary with surface involvement by low-grade serous carcinoma.  Omentum biopsy: Involved by low-grade serous carcinoma, invasive).  Sidewall, left pelvic biopsy: Edema with hemorrhage and chronic inflammation, negative for endometriosis and malignancy.  Lymph node left pelvic biopsy: 1 lymph node with a 0.9 mm focus of metastatic serous carcinoma surrounding adipose tissue with hemorrhage and edema.           PREVIOUS INTERVENTIONS:  IVC filter was placed 11/22/2022 at [place].  She had undergone exploratory laparotomy, total abdominal hysterectomy, bilateral salpingo-oophorectomy, debulking, left pelvic lymph node dissection and omentectomy by Dr. Hayward on 11/23/2022.  Estimated blood loss 400 ml.  Laparotomy showed a massive mass arising from the left ovary, retroperitoneal, infiltrating the retroperitoneal space and adherent to the sigmoid mesentery.  Within the retroperitoneum it was densely adherent to the iliac vessel on the left side and ureter.  Deliberate rupture of the mass was required in order to access the retroperitoneal attachment.  The mass was sent for frozen section and determined to be at least borderline tumor possibly low-grade serous tumor.  There was no other evidence of disease in the pelvis or upper abdomen.  Due to being densely adherent to the retroperitoneum and iliac vessels, it is unclear whether the entire tumor was removed or not and the pelvic sidewall biopsy was taken to determine whether the residual ovary was present and not a fibrotic reaction.  Bilateral ureterolysis was required.   Hypersensitivity reaction to Taxol 01/09/2023.   Adjuvant paclitaxel and carboplatin 01/10/2023 through 4/28/2023, 6 cycles.   Adjuvant letrozole on 6/2023 through present.     Ovarian cancer   12/15/2022 Initial Diagnosis    Ovarian cancer (HCC)     1/9/2023 - 4/28/2023 Chemotherapy    OP OVARIAN  PACLitaxel / CARBOplatin (Q21D)     1/9/2023 - 4/16/2024 Chemotherapy    OP CENTRAL VENOUS ACCESS DEVICE ACCESS, CARE, AND MAINTENANCE (CVAD)     1/20/2023 Cancer Staged    Staging form: Ovary, Fallopian Tube, And Primary Peritoneal Carcinoma, AJCC 8th Edition  - Pathologic stage from 1/20/2023: FIGO Stage III (pT3, pN1, cM0) - Signed by Walker Magana MD on 1/20/2023     2/15/2023 - 2/15/2023 Chemotherapy    OP MAGNESIUM          PAST MEDICAL HISTORY:  ALLERGIES:  No Known Allergies  CURRENT MEDICATIONS:  Outpatient Encounter Medications as of 11/15/2024   Medication Sig Dispense Refill    Calcium Carbonate-Vit D-Min (CALCIUM 1200 PO) Take 1 tablet by mouth Daily.      Eliquis 5 MG tablet tablet 1 tablet Every 12 (Twelve) Hours.      letrozole (FEMARA) 2.5 MG tablet Take 1 tablet by mouth Daily.      lisinopril-hydrochlorothiazide (PRINZIDE,ZESTORETIC) 20-12.5 MG per tablet Take 1 tablet by mouth Daily.      vitamin D3 125 MCG (5000 UT) capsule capsule Take  by mouth Daily.       No facility-administered encounter medications on file as of 11/15/2024.     ADULT ILLNESSES:  Patient Active Problem List   Diagnosis Code    Ovarian cancer C56.9     SURGERIES:  Past Surgical History:   Procedure Laterality Date    HYSTERECTOMY      TONSILLECTOMY      TUMOR REMOVAL Left 11/2022    VENA CAVA FILTER INSERTION      VENA CAVA FILTER REMOVAL N/A 11/17/2023    Procedure: ATTEMPTED VENA CAVA FILTER REMOVAL;  Surgeon: All Adorno DO;  Location: [facility] OR 12;  Service: Vascular;  Laterality: N/A;    VENOUS ACCESS DEVICE (PORT) INSERTION N/A 12/30/2022    Procedure: INSERTION VENOUS ACCESS DEVICE;  Surgeon: Holley Mcgill MD;  Location: [facility] OR;  Service: General;  Laterality: N/A;    VENOUS ACCESS DEVICE (PORT) REMOVAL N/A 5/21/2024    Procedure: REMOVAL VENOUS ACCESS DEVICE;  Surgeon: Abdi Hills MD;  Location:  [facility] OR;  Service: General;  Laterality: N/A;     HEALTH MAINTENANCE ITEMS:  Health Maintenance  "Due   Topic Date Due    BMI FOLLOWUP  Never done    COLORECTAL CANCER SCREENING  Never done    MAMMOGRAM  Never done    TDAP/TD VACCINES (2 - Tdap) 02/24/2008    ZOSTER VACCINE (1 of 2) Never done    HEPATITIS C SCREENING  Never done    ANNUAL PHYSICAL  Never done    INFLUENZA VACCINE  Never done    COVID-19 Vaccine (4 - 2024-25 season) 09/01/2024       <no information>  Last Completed Colonoscopy       This patient has no relevant Health Maintenance data.          Immunization History   Administered Date(s) Administered    COVID-19 (MODERNA) 1st,2nd,3rd Dose Monovalent 07/29/2021, 08/26/2021    COVID-19 (MODERNA) Monovalent Original Booster 01/27/2022     Last Completed Mammogram       This patient has no relevant Health Maintenance data.              FAMILY HISTORY:  Family History   Problem Relation Age of Onset    Hypertension Mother     Multiple sclerosis Father     Heart attack Maternal Grandfather     Diabetes Paternal Grandmother     Heart attack Paternal Grandfather      SOCIAL HISTORY:  Social History     Socioeconomic History    Marital status:    Tobacco Use    Smoking status: Never    Smokeless tobacco: Never   Vaping Use    Vaping status: Never Used   Substance and Sexual Activity    Alcohol use: Never    Drug use: Never    Sexual activity: Defer       REVIEW OF SYSTEMS:    Review of Systems   Constitutional:  Negative for chills and fever.        \"Feel fantastic.\"   HENT:  Negative for congestion and mouth sores.    Eyes:  Negative for discharge and redness.   Respiratory:  Negative for shortness of breath and wheezing.    Cardiovascular:  Negative for chest pain and palpitations.   Gastrointestinal:  Negative for abdominal pain, nausea and vomiting.   Endocrine: Negative for cold intolerance and heat intolerance.   Genitourinary:  Negative for hematuria and vaginal bleeding.   Musculoskeletal:  Negative for gait problem and myalgias.   Skin:  Negative for pallor.   Allergic/Immunologic: " "Negative for food allergies.   Neurological:  Negative for dizziness, speech difficulty and weakness.   Hematological:  Negative for adenopathy. Does not bruise/bleed easily.   Psychiatric/Behavioral:  Negative for agitation, confusion and hallucinations.        VITAL SIGNS: /78   Pulse 73   Temp 97.7 °F (36.5 °C)   Resp 16   Ht 169.4 cm (66.69\")   Wt 93.3 kg (205 lb 9.6 oz)   SpO2 98%   Breastfeeding No   BMI 32.50 kg/m²  Gained 4 ponds.   Pain Score    11/15/24 0802   PainSc: 0-No pain       PHYSICAL EXAMINATION:     Physical Exam  Vitals reviewed.   Constitutional:       General: She is not in acute distress.  HENT:      Head: Normocephalic and atraumatic.   Eyes:      General: No scleral icterus.  Cardiovascular:      Rate and Rhythm: Normal rate.   Pulmonary:      Effort: No respiratory distress.      Breath sounds: No wheezing.   Abdominal:      General: Bowel sounds are normal.      Palpations: Abdomen is soft.   Musculoskeletal:         General: No swelling.      Cervical back: Neck supple.   Skin:     Coloration: Skin is not pale.   Neurological:      Mental Status: She is alert and oriented to person, place, and time.   Psychiatric:         Mood and Affect: Mood normal.         Behavior: Behavior normal.         Thought Content: Thought content normal.         Judgment: Judgment normal.         LABS    Lab Results - Last 18 Months   Lab Units 11/08/24  0800 07/16/24  0746 05/16/24  1422 04/16/24  0836 01/18/24  0933 11/14/23  1239 11/14/23  0857   HEMOGLOBIN g/dL 12.3 12.7 12.3 12.1 11.6* 12.0 12.1   HEMATOCRIT % 36.2 38.0 36.1 36.1 34.8 36.4 37.0   MCV fL 86.0 87.6 86.4 86.6 87.0 88.6 88.5   WBC 10*3/mm3 9.66 9.19 9.58 8.25 7.96 7.65 7.74   RDW % 12.3 12.2* 12.4 12.5 12.3 12.2* 12.1*   MPV fL 9.0 9.4 9.5 9.6 9.3 9.6 9.3   PLATELETS 10*3/mm3 216 221 227 209 204 197 194   IMM GRAN % % 0.2 0.2  --  0.2 0.3 0.3 0.4   NEUTROS ABS 10*3/mm3 6.83 6.21  --  5.49 5.12 4.97 5.30   LYMPHS ABS 10*3/mm3 " "1.98 2.26  --  1.98 2.00 1.88 1.58   MONOS ABS 10*3/mm3 0.72 0.64  --  0.68 0.71 0.70 0.73   EOS ABS 10*3/mm3 0.06 0.02  --  0.04 0.06 0.04 0.05   BASOS ABS 10*3/mm3 0.05 0.04  --  0.04 0.05 0.04 0.05   IMMATURE GRANS (ABS) 10*3/mm3 0.02 0.02  --  0.02 0.02 0.02 0.03   NRBC /100 WBC 0.0 0.0  --  0.0 0.0 0.0 0.0       Lab Results - Last 18 Months   Lab Units 11/08/24  0800 07/16/24  0746 05/16/24  1422 04/16/24  0836 01/18/24  0933 12/27/23  0921 11/14/23  1239 11/14/23  0857 09/27/23  0902   GLUCOSE mg/dL 82 81 129* 95 100*  --  89 97 69   SODIUM mmol/L 141 140 140 141 140  --  142 139 141   POTASSIUM mmol/L 4.3 3.8 4.2 4.0 3.9  --  3.9 4.4 3.8   CO2 mmol/L 29.0 29.0 30.0* 26.0 26.0  --  30.0* 30.0* 27.0   CHLORIDE mmol/L 104 102 101 104 104  --  102 103 104   ANION GAP mmol/L 8.0 9.0 9.0 11.0 10.0  --  10.0 6.0 10.0   CREATININE mg/dL 1.31* 1.35* 1.25* 1.46* 1.25* 1.40* 1.36* 1.27* 1.39*   BUN mg/dL 26* 22 17 29* 23  --  23 22 20   BUN / CREAT RATIO  19.8 16.3 13.6 19.9 18.4  --  16.9 17.3 14.4   CALCIUM mg/dL 10.2 10.3 10.2 10.0 9.7  --  9.7 9.8 10.0   ALK PHOS U/L 124* 113  --  93 78  --   --  75 69   TOTAL PROTEIN g/dL 7.2 7.3  --  7.1 6.9  --   --  7.1 6.8   ALT (SGPT) U/L 17 15  --  15 18  --   --  14 10   AST (SGOT) U/L 18 18  --  19 19  --   --  17 16   BILIRUBIN mg/dL 0.2 0.3  --  0.4 0.3  --   --  0.2 0.3   ALBUMIN g/dL 4.1 4.2  --  4.4 4.1  --   --  4.3 4.1   GLOBULIN gm/dL 3.1 3.1  --  2.7 2.8  --   --  2.8 2.7       No results for input(s): \"MSPIKE\", \"KAPPALAMB\", \"IGLFLC\", \"URICACID\", \"FREEKAPPAL\", \"CEA\", \"LDH\", \"REFLABREPO\" in the last 22047 hours.    Lab Results - Last 18 Months   Lab Units 06/21/23  0750   IRON mcg/dL 76   TIBC mcg/dL 325   IRON SATURATION (TSAT) % 23   FERRITIN ng/mL 262.30*       Tiff Elizalde reports a pain score of 0.       ASSESSMENT:  1.  Ovarian cancer, low-grade serous carcinoma, left.  Tumor size 18 cm. Negative genetic abnormality.   AJCC stage:IIIA1 (pT3, pN1, cM0, " "G1)  Treatment status: Postadjuvant paclitaxel and carboplatin 01/10/2023 through 4/28/2023, 6 cycles.  She is taking adjuvant letrozole 2.5 mg 6/2023 through present by Dr. Hayward.  2.  Performance status of 0.  3.  Left deep venous thrombosis from malignancy.  Patient on therapy with apixaban.  4.  Pulmonary embolism from malignancy.  Undergoing treatment with apixaban.  Post IVC filter 11/22/2022.  Unable to remove IVC filter on multiple attempts on 11/17/2023.  5.  Anemia from iron deficiency and chronic kidney disease stage IIIa, GFR 59.3 ml/min on 2/3/2023. Oral iron 01/10/2023 through 6/21/2023.    6.  Hypersensitivity reaction to paclitaxel on 01/09/2023. Responded to dexamethasone as premed.   7.  Grade 1 neuropathy. Under observation.  Patient is off gabapentin.    8.  RIGHT lower lobe 3 mm pulmonary nodule. On observation.  9.  RIGHT thyroid with a 4 cm heterogeneous nodule.  Management per primary care.  10. Nodule located in the segment 4 a of the liver measures 7 mm on 5/12/2023, MRI abdomen on 6/12/2023 showed benign cyst.  11.  Osteopenia.  On vitamin D and calcium.               PLAN:  1.   Re: CT chest 10/25/2024. No new suspicious findings. Similar small pulmonary nodules  redemonstrated and unchanged.  Re demonstration of large RIGHT lobe thyroid nodule. Recommend  dedicated thyroid ultrasound if not previously performed at an outside facility.  Report sent to PCP.  2.   Re: Tolerance to letrozole. \"Fine.\"  3.   Re: Heme status.  WBC 9.6, hemoglobin 12.3, hematocrit 36.2, MCV 86 and platelet 216.   4.   Re: CMP.  GFR 46.5 mL per minute and alkaline phosphatase 124, observe.   5.   Re:  at 31.2 from  37.2 from 33.6 from 32.3 from 35 from 38.9 from 43 from 33.    6.   Re: Tolerance to letrozole. \"No problems with it.\"  7.   Re: Note from Dr. Virgilio Hayward 9/19/2024.  Patient no evidence of disease.  Follow-up in 3 months.  8.   Re: " "Stable for observation, ovarian cancer.  9.  Schedule CT of the chest without IV contrast late 1/2025 to follow the small lung nodules.   10.  eRx for ondansetron 8 mg p.o. every 8 hours as needed for nausea and vomiting #60, 2 refills if needed.  11.  eRx gabapentin 300 mg po three times daily, 90, 2 refills if needed.  Observe for hypersomnolence, dizziness or hypotension.  12.  eRx letrozole 2.5 mg po daily, 90, 3 refills if needed.  Observe for bone loss, arthralgias, hair thinning or hot flashes.   13.  Continue ongoing management per primary care physician and the other specialists.  14.  Plan of care discussed with patient.  Understanding expressed.  The patient is agreeable to proceed.  15. Questions were answered to her satisfaction. \"Fine.\"  16.  She will be seen every 2 to 4 months for the first 2 years then every 3 to 6 months for the next 3 years.  Annually after 5 years.  Imaging as clinically indicated.  17.  Return to office in 12 weeks with CBC with differential, CMP, , and pre office left leg venous doppler.   18. To see Dr. Hayward 12/19/2025.            I have reviewed the assessment and plan and verified the accuracy of it. No changes to assessment and plan since the information was documented. Walker Magana MD 11/15/24       I spent 32 total minutes, face-to-face, caring for Tiff today. Greater than 50% of this time involved counseling and/or coordination of care as documented within this note.               Virgilio Hayward MD  (Abdi Hills MD)  Tyrone Garza MD      "

## 2024-11-08 ENCOUNTER — LAB (OUTPATIENT)
Dept: LAB | Facility: HOSPITAL | Age: 59
End: 2024-11-08
Payer: COMMERCIAL

## 2024-11-08 DIAGNOSIS — R91.1 LUNG NODULE SEEN ON IMAGING STUDY: ICD-10-CM

## 2024-11-08 DIAGNOSIS — C56.2 MALIGNANT NEOPLASM OF LEFT OVARY: ICD-10-CM

## 2024-11-08 LAB
ALBUMIN SERPL-MCNC: 4.1 G/DL (ref 3.5–5.2)
ALBUMIN/GLOB SERPL: 1.3 G/DL
ALP SERPL-CCNC: 124 U/L (ref 39–117)
ALT SERPL W P-5'-P-CCNC: 17 U/L (ref 1–33)
ANION GAP SERPL CALCULATED.3IONS-SCNC: 8 MMOL/L (ref 5–15)
AST SERPL-CCNC: 18 U/L (ref 1–32)
BASOPHILS # BLD AUTO: 0.05 10*3/MM3 (ref 0–0.2)
BASOPHILS NFR BLD AUTO: 0.5 % (ref 0–1.5)
BILIRUB SERPL-MCNC: 0.2 MG/DL (ref 0–1.2)
BUN SERPL-MCNC: 26 MG/DL (ref 8–23)
BUN/CREAT SERPL: 19.8 (ref 7–25)
CALCIUM SPEC-SCNC: 10.2 MG/DL (ref 8.6–10.5)
CANCER AG125 SERPL QL: 31.2 U/ML (ref 0–38.1)
CHLORIDE SERPL-SCNC: 104 MMOL/L (ref 98–107)
CO2 SERPL-SCNC: 29 MMOL/L (ref 22–29)
CREAT SERPL-MCNC: 1.31 MG/DL (ref 0.57–1)
DEPRECATED RDW RBC AUTO: 38.5 FL (ref 37–54)
EGFRCR SERPLBLD CKD-EPI 2021: 46.5 ML/MIN/1.73
EOSINOPHIL # BLD AUTO: 0.06 10*3/MM3 (ref 0–0.4)
EOSINOPHIL NFR BLD AUTO: 0.6 % (ref 0.3–6.2)
ERYTHROCYTE [DISTWIDTH] IN BLOOD BY AUTOMATED COUNT: 12.3 % (ref 12.3–15.4)
GLOBULIN UR ELPH-MCNC: 3.1 GM/DL
GLUCOSE SERPL-MCNC: 82 MG/DL (ref 65–99)
HCT VFR BLD AUTO: 36.2 % (ref 34–46.6)
HGB BLD-MCNC: 12.3 G/DL (ref 12–15.9)
IMM GRANULOCYTES # BLD AUTO: 0.02 10*3/MM3 (ref 0–0.05)
IMM GRANULOCYTES NFR BLD AUTO: 0.2 % (ref 0–0.5)
LYMPHOCYTES # BLD AUTO: 1.98 10*3/MM3 (ref 0.7–3.1)
LYMPHOCYTES NFR BLD AUTO: 20.5 % (ref 19.6–45.3)
MCH RBC QN AUTO: 29.2 PG (ref 26.6–33)
MCHC RBC AUTO-ENTMCNC: 34 G/DL (ref 31.5–35.7)
MCV RBC AUTO: 86 FL (ref 79–97)
MONOCYTES # BLD AUTO: 0.72 10*3/MM3 (ref 0.1–0.9)
MONOCYTES NFR BLD AUTO: 7.5 % (ref 5–12)
NEUTROPHILS NFR BLD AUTO: 6.83 10*3/MM3 (ref 1.7–7)
NEUTROPHILS NFR BLD AUTO: 70.7 % (ref 42.7–76)
NRBC BLD AUTO-RTO: 0 /100 WBC (ref 0–0.2)
PLATELET # BLD AUTO: 216 10*3/MM3 (ref 140–450)
PMV BLD AUTO: 9 FL (ref 6–12)
POTASSIUM SERPL-SCNC: 4.3 MMOL/L (ref 3.5–5.2)
PROT SERPL-MCNC: 7.2 G/DL (ref 6–8.5)
RBC # BLD AUTO: 4.21 10*6/MM3 (ref 3.77–5.28)
SODIUM SERPL-SCNC: 141 MMOL/L (ref 136–145)
WBC NRBC COR # BLD AUTO: 9.66 10*3/MM3 (ref 3.4–10.8)

## 2024-11-08 PROCEDURE — 36415 COLL VENOUS BLD VENIPUNCTURE: CPT

## 2024-11-08 PROCEDURE — 85025 COMPLETE CBC W/AUTO DIFF WBC: CPT

## 2024-11-08 PROCEDURE — 86304 IMMUNOASSAY TUMOR CA 125: CPT

## 2024-11-08 PROCEDURE — 80053 COMPREHEN METABOLIC PANEL: CPT

## 2024-11-15 ENCOUNTER — OFFICE VISIT (OUTPATIENT)
Dept: ONCOLOGY | Facility: CLINIC | Age: 59
End: 2024-11-15
Payer: COMMERCIAL

## 2024-11-15 VITALS
SYSTOLIC BLOOD PRESSURE: 144 MMHG | RESPIRATION RATE: 16 BRPM | TEMPERATURE: 97.7 F | BODY MASS INDEX: 32.27 KG/M2 | HEIGHT: 67 IN | WEIGHT: 205.6 LBS | DIASTOLIC BLOOD PRESSURE: 78 MMHG | HEART RATE: 73 BPM | OXYGEN SATURATION: 98 %

## 2024-11-15 DIAGNOSIS — C56.2 MALIGNANT NEOPLASM OF LEFT OVARY: Primary | ICD-10-CM

## 2025-02-20 NOTE — PROGRESS NOTES
[facility] HEMATOLOGY & ONCOLOGY  [address]  [place] [address] [phone]    Patient Name: Tiff Elizalde  Encounter Date: 02/28/2025  YOB: 1965  Patient Number: [id number]      REASON FOR FOLLOW-UP: Tiff Elizalde is a pleasant 59 y.o.  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 She is seen 22 months post 6 cycles of adjuvant paclitaxel and carboplatin.  She had hypersensitivity reaction to paclitaxel on C1D1.  Patient is taking adjuvant letrozole since 6/2023 through present. The patient is seen spouse, Dennis.  History is obtained from patient.  The patient is a reliable historian.       Pertinent history.  1.RIGHT thyroid with a 4 cm heterogeneous nodule.  Management per primary care.        Oncology/Hematology History Overview Note   DIAGNOSTIC ABNORMALITIES:   Pain lower back and left leg swelling.  She presented to her PCP.   CT scan done at [facility]. Details not available.  Patient seen by Dr. Virgilio Hayward 11/23/2022. Patient found to have a large pelvic mass causing bilateral hydronephrosis with resultant nonfunctioning left kidney, elevated creatinine, left deep venous thrombosis and pulmonary embolus.  Pathology report 11/28/2022.  Fallopian tube and ovary, left salpingo-oophorectomy: Fallopian tube with no evidence of involvement by malignancy.  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  No surface involvement demonstrated.  Uterus, fallopian tube and ovary, hysterectomy with right salpingo-oophorectomy: Cervix and endocervix with no evidence of squamous or glandular dysplasia.  Chronic cervicitis present.  Weakly proliferative endometrium, negative for hyperplasia, carcinoma, and endometritis.  Benign endometrial polyp present.  Adenomyosis.  No leiomyomata.  Right fallopian tube with focal involvement  by serous neoplasm with Samona by this most consistent with low-grade serous carcinoma.  Remnant left ovary with adhesions to uterine serosa and focal involvement by low-grade serous carcinoma.  Right ovary with surface involvement by low-grade serous carcinoma.  Omentum biopsy: Involved by low-grade serous carcinoma, invasive).  Sidewall, left pelvic biopsy: Edema with hemorrhage and chronic inflammation, negative for endometriosis and malignancy.  Lymph node left pelvic biopsy: 1 lymph node with a 0.9 mm focus of metastatic serous carcinoma surrounding adipose tissue with hemorrhage and edema.           PREVIOUS INTERVENTIONS:  IVC filter was placed 11/22/2022 at [place].  She had undergone exploratory laparotomy, total abdominal hysterectomy, bilateral salpingo-oophorectomy, debulking, left pelvic lymph node dissection and omentectomy by Dr. Hayward on 11/23/2022.  Estimated blood loss 400 ml.  Laparotomy showed a massive mass arising from the left ovary, retroperitoneal, infiltrating the retroperitoneal space and adherent to the sigmoid mesentery.  Within the retroperitoneum it was densely adherent to the iliac vessel on the left side and ureter.  Deliberate rupture of the mass was required in order to access the retroperitoneal attachment.  The mass was sent for frozen section and determined to be at least borderline tumor possibly low-grade serous tumor.  There was no other evidence of disease in the pelvis or upper abdomen.  Due to being densely adherent to the retroperitoneum and iliac vessels, it is unclear whether the entire tumor was removed or not and the pelvic sidewall biopsy was taken to determine whether the residual ovary was present and not a fibrotic reaction.  Bilateral ureterolysis was required.   Hypersensitivity reaction to Taxol 01/09/2023.   Adjuvant paclitaxel and carboplatin 01/10/2023 through 4/28/2023, 6 cycles.   Adjuvant letrozole on 6/2023 through present.     Ovarian cancer    12/15/2022 Initial Diagnosis    Ovarian cancer (HCC)     1/9/2023 - 4/28/2023 Chemotherapy    OP OVARIAN PACLitaxel / CARBOplatin (Q21D)     1/9/2023 - 4/16/2024 Chemotherapy    OP CENTRAL VENOUS ACCESS DEVICE ACCESS, CARE, AND MAINTENANCE (CVAD)     1/20/2023 Cancer Staged    Staging form: Ovary, Fallopian Tube, And Primary Peritoneal Carcinoma, AJCC 8th Edition  - Pathologic stage from 1/20/2023: FIGO Stage III (pT3, pN1, cM0) - Signed by Walker Magana MD on 1/20/2023     2/15/2023 - 2/15/2023 Chemotherapy    OP MAGNESIUM          PAST MEDICAL HISTORY:  ALLERGIES:  No Known Allergies  CURRENT MEDICATIONS:  Outpatient Encounter Medications as of 2/28/2025   Medication Sig Dispense Refill    Calcium Carbonate-Vit D-Min (CALCIUM 1200 PO) Take 1 tablet by mouth Daily.      Eliquis 5 MG tablet tablet 1 tablet Every 12 (Twelve) Hours.      letrozole (FEMARA) 2.5 MG tablet Take 1 tablet by mouth Daily.      lisinopril-hydrochlorothiazide (PRINZIDE,ZESTORETIC) 20-12.5 MG per tablet Take 1 tablet by mouth Daily.      vitamin D3 125 MCG (5000 UT) capsule capsule Take  by mouth Daily.       No facility-administered encounter medications on file as of 2/28/2025.     ADULT ILLNESSES:  Patient Active Problem List   Diagnosis Code    Ovarian cancer C56.9     SURGERIES:  Past Surgical History:   Procedure Laterality Date    HYSTERECTOMY      TONSILLECTOMY      TUMOR REMOVAL Left 11/2022    VENA CAVA FILTER INSERTION      VENA CAVA FILTER REMOVAL N/A 11/17/2023    Procedure: ATTEMPTED VENA CAVA FILTER REMOVAL;  Surgeon: All Adorno DO;  Location: [facility] OR 12;  Service: Vascular;  Laterality: N/A;    VENOUS ACCESS DEVICE (PORT) INSERTION N/A 12/30/2022    Procedure: INSERTION VENOUS ACCESS DEVICE;  Surgeon: Holley Mcgill MD;  Location[facility] OR;  Service: General;  Laterality: N/A;    VENOUS ACCESS DEVICE (PORT) REMOVAL N/A 5/21/2024    Procedure: REMOVAL VENOUS ACCESS DEVICE;  Surgeon: Abdi Hills MD;   "Location: [facility] OR;  Service: General;  Laterality: N/A;     HEALTH MAINTENANCE ITEMS:  Health Maintenance Due   Topic Date Due    BMI FOLLOWUP  Never done    MAMMOGRAM  Never done    TDAP/TD VACCINES (2 - Tdap) 02/24/2008    COLORECTAL CANCER SCREENING  Never done    Pneumococcal Vaccine 50+ (1 of 1 - PCV) Never done    ZOSTER VACCINE (1 of 2) Never done    HEPATITIS C SCREENING  Never done    ANNUAL PHYSICAL  Never done    INFLUENZA VACCINE  Never done    COVID-19 Vaccine (4 - 2024-25 season) 09/01/2024       <no information>  Last Completed Colonoscopy       This patient has no relevant Health Maintenance data.          Immunization History   Administered Date(s) Administered    COVID-19 (MODERNA) 1st,2nd,3rd Dose Monovalent 07/29/2021, 08/26/2021    COVID-19 (MODERNA) Monovalent Original Booster 01/27/2022     Last Completed Mammogram       This patient has no relevant Health Maintenance data.              FAMILY HISTORY:  Family History   Problem Relation Age of Onset    Hypertension Mother     Multiple sclerosis Father     Heart attack Maternal Grandfather     Diabetes Paternal Grandmother     Heart attack Paternal Grandfather      SOCIAL HISTORY:  Social History     Socioeconomic History    Marital status:    Tobacco Use    Smoking status: Never    Smokeless tobacco: Never   Vaping Use    Vaping status: Never Used   Substance and Sexual Activity    Alcohol use: Never    Drug use: Never    Sexual activity: Defer       REVIEW OF SYSTEMS:    Review of Systems   Constitutional:  Negative for fatigue, fever and unexpected weight change.        \"I feel great.\"   HENT:  Negative for congestion and nosebleeds.    Eyes:  Negative for discharge and redness.   Respiratory:  Negative for shortness of breath and wheezing.    Cardiovascular:  Negative for chest pain and palpitations.   Gastrointestinal:  Negative for abdominal pain, nausea and vomiting.   Endocrine: Negative for cold intolerance and heat " "intolerance.   Genitourinary:  Negative for hematuria and vaginal bleeding.   Musculoskeletal:  Negative for gait problem and neck stiffness.   Skin:  Negative for pallor.   Allergic/Immunologic: Negative for food allergies.   Neurological:  Negative for speech difficulty and weakness.        \"Toes, left foot still numb.\"   Hematological:  Negative for adenopathy. Does not bruise/bleed easily.   Psychiatric/Behavioral:  Negative for agitation, confusion and hallucinations.        VITAL SIGNS: /80   Pulse 70   Temp 97.5 °F (36.4 °C)   Resp 16   Ht 169.4 cm (66.69\")   Wt 95.7 kg (211 lb)   SpO2 99%   Breastfeeding No   BMI 33.36 kg/m²  Gained 6 pounds.   Pain Score    02/28/25 0826   PainSc: 0-No pain       PHYSICAL EXAMINATION:     Physical Exam  Vitals reviewed.   Constitutional:       General: She is not in acute distress.  HENT:      Head: Normocephalic and atraumatic.   Eyes:      General: No scleral icterus.  Cardiovascular:      Rate and Rhythm: Normal rate.   Pulmonary:      Effort: No respiratory distress.      Breath sounds: No wheezing.   Abdominal:      General: Bowel sounds are normal.      Palpations: Abdomen is soft.      Tenderness: There is no abdominal tenderness.   Musculoskeletal:         General: No swelling.      Cervical back: Neck supple.   Skin:     Coloration: Skin is not pale.   Neurological:      Mental Status: She is alert and oriented to person, place, and time.   Psychiatric:         Mood and Affect: Mood normal.         Behavior: Behavior normal.         Thought Content: Thought content normal.         Judgment: Judgment normal.         LABS    Lab Results - Last 18 Months   Lab Units 02/21/25  0729 11/08/24  0800 07/16/24  0746 05/16/24  1422 04/16/24  0836 01/18/24  0933 11/14/23  1239   HEMOGLOBIN g/dL 12.7 12.3 12.7 12.3 12.1 11.6* 12.0   HEMATOCRIT % 37.9 36.2 38.0 36.1 36.1 34.8 36.4   MCV fL 86.3 86.0 87.6 86.4 86.6 87.0 88.6   WBC 10*3/mm3 9.35 9.66 9.19 9.58 8.25 " "7.96 7.65   RDW % 12.5 12.3 12.2* 12.4 12.5 12.3 12.2*   MPV fL 9.8 9.0 9.4 9.5 9.6 9.3 9.6   PLATELETS 10*3/mm3 228 216 221 227 209 204 197   IMM GRAN % % 0.3 0.2 0.2  --  0.2 0.3 0.3   NEUTROS ABS 10*3/mm3 6.47 6.83 6.21  --  5.49 5.12 4.97   LYMPHS ABS 10*3/mm3 2.10 1.98 2.26  --  1.98 2.00 1.88   MONOS ABS 10*3/mm3 0.68 0.72 0.64  --  0.68 0.71 0.70   EOS ABS 10*3/mm3 0.02 0.06 0.02  --  0.04 0.06 0.04   BASOS ABS 10*3/mm3 0.05 0.05 0.04  --  0.04 0.05 0.04   IMMATURE GRANS (ABS) 10*3/mm3 0.03 0.02 0.02  --  0.02 0.02 0.02   NRBC /100 WBC 0.0 0.0 0.0  --  0.0 0.0 0.0       Lab Results - Last 18 Months   Lab Units 02/21/25  0729 11/08/24  0800 07/16/24  0746 05/16/24  1422 04/16/24  0836 01/18/24  0933 11/14/23  1239 11/14/23  0857   GLUCOSE mg/dL 113* 82 81 129* 95 100*   < > 97   SODIUM mmol/L 141 141 140 140 141 140   < > 139   POTASSIUM mmol/L 4.4 4.3 3.8 4.2 4.0 3.9   < > 4.4   CO2 mmol/L 27.0 29.0 29.0 30.0* 26.0 26.0   < > 30.0*   CHLORIDE mmol/L 104 104 102 101 104 104   < > 103   ANION GAP mmol/L 10.0 8.0 9.0 9.0 11.0 10.0   < > 6.0   CREATININE mg/dL 1.35* 1.31* 1.35* 1.25* 1.46* 1.25*   < > 1.27*   BUN mg/dL 25* 26* 22 17 29* 23   < > 22   BUN / CREAT RATIO  18.5 19.8 16.3 13.6 19.9 18.4   < > 17.3   CALCIUM mg/dL 9.9 10.2 10.3 10.2 10.0 9.7   < > 9.8   ALK PHOS U/L 117 124* 113  --  93 78  --  75   TOTAL PROTEIN g/dL 7.0 7.2 7.3  --  7.1 6.9  --  7.1   ALT (SGPT) U/L 14 17 15  --  15 18  --  14   AST (SGOT) U/L 16 18 18  --  19 19  --  17   BILIRUBIN mg/dL 0.2 0.2 0.3  --  0.4 0.3  --  0.2   ALBUMIN g/dL 4.1 4.1 4.2  --  4.4 4.1  --  4.3   GLOBULIN gm/dL 2.9 3.1 3.1  --  2.7 2.8  --  2.8    < > = values in this interval not displayed.       No results for input(s): \"MSPIKE\", \"KAPPALAMB\", \"IGLFLC\", \"URICACID\", \"FREEKAPPAL\", \"CEA\", \"LDH\", \"REFLABREPO\" in the last 36317 hours.    No results for input(s): \"IRON\", \"TIBC\", \"LABIRON\", \"FERRITIN\", \"A8KXRWI\", \"TSH\", \"FOLATE\" in the last 08148 hours.    Invalid " "input(s): \"VITB12\"    Tiff Elizalde reports a pain score of 0.         ASSESSMENT:  1.  Ovarian cancer, low-grade serous carcinoma, left.  Tumor size 18 cm. Negative genetic abnormality.   AJCC stage:IIIA1 (pT3, pN1, cM0, G1)  Treatment status: Patient treated with adjuvant paclitaxel and carboplatin 01/10/2023 through 4/28/2023, 6 cycles.  Patient on adjuvant adjuvant letrozole 2.5 mg 6/2023 through present.  2.  Performance status of 0.  3.  Left deep venous thrombosis from malignancy.  She is taking apixaban.  4.  Pulmonary embolism from malignancy.  On apixaban .  Post IVC filter 11/22/2022.  Unable to remove IVC filter on multiple attempts on 11/17/2023.  5.  Anemia from iron deficiency and chronic kidney disease stage IIIa, GFR 59.3 ml/min on 2/3/2023.  Treated with oral iron 01/10/2023 through 6/21/2023.    6.  Hypersensitivity reaction to paclitaxel on 01/09/2023. Responded to dexamethasone as premed.   7.  Grade 1 neuropathy.  Patient is off gabapentin.  On observation.  8.  RIGHT lower lobe 3 mm pulmonary nodule.  On observation.  9.  Nodule located in the segment 4 a of the liver measures 7 mm on 5/12/2023, MRI abdomen on 6/12/2023 showed benign cyst.  10.  Osteopenia.  Taking vitamin D and calcium.               PLAN:  1.   Re: Venous Doppler not done. \"Will do it next visit. We have to pay $10,000.\"  2.   Re: Tolerance to letrozole. \"It's fine.\"  3.   Re: Heme status.  WBC 9.35, hemoglobin 12.7, hematocrit 37.9, MCV 86.3 and platelet 228.   4.   Re: CMP.  GFR 44.8 from 46.5 mL per minute and alkaline phosphatase 117 from 124.   5.   Re:  at 28.1 from 31.2 from  37.2 from 33.6 from 32.3 from 35 from 38.9 from 43 from 33.    6.   Re: CT chest not done. \"Will do it next visit. We have to pay $10,000.\"  7.   Re: Seen by Dr. Hayward on 1/21/2025. \"Everything good.\"  8.   Re: Stable for observation, left ovarian cancer.  9.  Schedule CT of the " "chest without IV contrast late 5/2025 to follow the small lung nodules.   10.  eRx for ondansetron 8 mg p.o. every 8 hours as needed for nausea and vomiting #60, 2 refills if needed.  11.  eRx gabapentin 300 mg po three times daily, 90, 2 refills if needed.  Monitor for dizziness, hypotension or hypersomnolence.  12.  eRx letrozole 2.5 mg po daily, 90, 3 refills if needed.  Monitor for hot flashes, bone loss, arthralgias or hair thinning.   13.  Continue ongoing management per primary care physician and the other specialists  14.  Plan of care discussed with patient.  Understanding expressed.  She is agreeable to proceed.  15. Questions were answered to her satisfaction. \"Fine.\"  16.  She will be seen every 2 to 4 months for the first 2 years then every 3 to 6 months for the next 3 years.  Annually after 5 years.  Imaging as clinically indicated.  17.  Return to office in 3 months with CBC with differential, CMP, , and pre office left leg venous doppler.   18. To see Dr. Hayward 4/2025.        I have reviewed the assessment and plan and verified the accuracy of it. No changes to assessment and plan since the information was documented. Walker Magana MD 02/28/25         I spent 33 total minutes, face-to-face, caring for Tiff today. Greater than 50% of this time involved counseling and/or coordination of care as documented within this note.                Virgilio Hayward MD  (Abdi Hills MD)  Tyrone Garza MD          "

## 2025-02-21 ENCOUNTER — LAB (OUTPATIENT)
Dept: LAB | Facility: HOSPITAL | Age: 60
End: 2025-02-21
Payer: COMMERCIAL

## 2025-02-21 DIAGNOSIS — C56.2 MALIGNANT NEOPLASM OF LEFT OVARY: ICD-10-CM

## 2025-02-21 LAB
ALBUMIN SERPL-MCNC: 4.1 G/DL (ref 3.5–5.2)
ALBUMIN/GLOB SERPL: 1.4 G/DL
ALP SERPL-CCNC: 117 U/L (ref 39–117)
ALT SERPL W P-5'-P-CCNC: 14 U/L (ref 1–33)
ANION GAP SERPL CALCULATED.3IONS-SCNC: 10 MMOL/L (ref 5–15)
AST SERPL-CCNC: 16 U/L (ref 1–32)
BASOPHILS # BLD AUTO: 0.05 10*3/MM3 (ref 0–0.2)
BASOPHILS NFR BLD AUTO: 0.5 % (ref 0–1.5)
BILIRUB SERPL-MCNC: 0.2 MG/DL (ref 0–1.2)
BUN SERPL-MCNC: 25 MG/DL (ref 8–23)
BUN/CREAT SERPL: 18.5 (ref 7–25)
CALCIUM SPEC-SCNC: 9.9 MG/DL (ref 8.6–10.5)
CANCER AG125 SERPL QL: 28.1 U/ML (ref 0–38.1)
CHLORIDE SERPL-SCNC: 104 MMOL/L (ref 98–107)
CO2 SERPL-SCNC: 27 MMOL/L (ref 22–29)
CREAT SERPL-MCNC: 1.35 MG/DL (ref 0.57–1)
DEPRECATED RDW RBC AUTO: 39.5 FL (ref 37–54)
EGFRCR SERPLBLD CKD-EPI 2021: 44.8 ML/MIN/1.73
EOSINOPHIL # BLD AUTO: 0.02 10*3/MM3 (ref 0–0.4)
EOSINOPHIL NFR BLD AUTO: 0.2 % (ref 0.3–6.2)
ERYTHROCYTE [DISTWIDTH] IN BLOOD BY AUTOMATED COUNT: 12.5 % (ref 12.3–15.4)
GLOBULIN UR ELPH-MCNC: 2.9 GM/DL
GLUCOSE SERPL-MCNC: 113 MG/DL (ref 65–99)
HCT VFR BLD AUTO: 37.9 % (ref 34–46.6)
HGB BLD-MCNC: 12.7 G/DL (ref 12–15.9)
IMM GRANULOCYTES # BLD AUTO: 0.03 10*3/MM3 (ref 0–0.05)
IMM GRANULOCYTES NFR BLD AUTO: 0.3 % (ref 0–0.5)
LYMPHOCYTES # BLD AUTO: 2.1 10*3/MM3 (ref 0.7–3.1)
LYMPHOCYTES NFR BLD AUTO: 22.5 % (ref 19.6–45.3)
MCH RBC QN AUTO: 28.9 PG (ref 26.6–33)
MCHC RBC AUTO-ENTMCNC: 33.5 G/DL (ref 31.5–35.7)
MCV RBC AUTO: 86.3 FL (ref 79–97)
MONOCYTES # BLD AUTO: 0.68 10*3/MM3 (ref 0.1–0.9)
MONOCYTES NFR BLD AUTO: 7.3 % (ref 5–12)
NEUTROPHILS NFR BLD AUTO: 6.47 10*3/MM3 (ref 1.7–7)
NEUTROPHILS NFR BLD AUTO: 69.2 % (ref 42.7–76)
NRBC BLD AUTO-RTO: 0 /100 WBC (ref 0–0.2)
PLATELET # BLD AUTO: 228 10*3/MM3 (ref 140–450)
PMV BLD AUTO: 9.8 FL (ref 6–12)
POTASSIUM SERPL-SCNC: 4.4 MMOL/L (ref 3.5–5.2)
PROT SERPL-MCNC: 7 G/DL (ref 6–8.5)
RBC # BLD AUTO: 4.39 10*6/MM3 (ref 3.77–5.28)
SODIUM SERPL-SCNC: 141 MMOL/L (ref 136–145)
WBC NRBC COR # BLD AUTO: 9.35 10*3/MM3 (ref 3.4–10.8)

## 2025-02-21 PROCEDURE — 80053 COMPREHEN METABOLIC PANEL: CPT

## 2025-02-21 PROCEDURE — 85025 COMPLETE CBC W/AUTO DIFF WBC: CPT

## 2025-02-21 PROCEDURE — 36415 COLL VENOUS BLD VENIPUNCTURE: CPT

## 2025-02-21 PROCEDURE — 86304 IMMUNOASSAY TUMOR CA 125: CPT

## 2025-02-28 ENCOUNTER — OFFICE VISIT (OUTPATIENT)
Dept: ONCOLOGY | Facility: CLINIC | Age: 60
End: 2025-02-28
Payer: COMMERCIAL

## 2025-02-28 VITALS
SYSTOLIC BLOOD PRESSURE: 148 MMHG | BODY MASS INDEX: 33.12 KG/M2 | OXYGEN SATURATION: 99 % | HEIGHT: 67 IN | HEART RATE: 70 BPM | RESPIRATION RATE: 16 BRPM | TEMPERATURE: 97.5 F | WEIGHT: 211 LBS | DIASTOLIC BLOOD PRESSURE: 80 MMHG

## 2025-02-28 DIAGNOSIS — C56.2 MALIGNANT NEOPLASM OF LEFT OVARY: Primary | ICD-10-CM

## 2025-02-28 RX ORDER — APIXABAN 5 MG/1
5 TABLET, FILM COATED ORAL EVERY 12 HOURS SCHEDULED
Qty: 60 TABLET | Refills: 2 | Status: SHIPPED | OUTPATIENT
Start: 2025-02-28

## 2025-05-12 ENCOUNTER — TELEPHONE (OUTPATIENT)
Dept: ONCOLOGY | Facility: CLINIC | Age: 60
End: 2025-05-12

## 2025-05-12 ENCOUNTER — LAB (OUTPATIENT)
Dept: LAB | Facility: HOSPITAL | Age: 60
End: 2025-05-12
Payer: COMMERCIAL

## 2025-05-12 DIAGNOSIS — C56.2 MALIGNANT NEOPLASM OF LEFT OVARY: ICD-10-CM

## 2025-05-12 DIAGNOSIS — C56.2 MALIGNANT NEOPLASM OF LEFT OVARY: Primary | ICD-10-CM

## 2025-05-12 LAB
ALBUMIN SERPL-MCNC: 4.2 G/DL (ref 3.5–5.2)
ALBUMIN/GLOB SERPL: 1.4 G/DL
ALP SERPL-CCNC: 112 U/L (ref 39–117)
ALT SERPL W P-5'-P-CCNC: 14 U/L (ref 1–33)
ANION GAP SERPL CALCULATED.3IONS-SCNC: 12 MMOL/L (ref 5–15)
AST SERPL-CCNC: 18 U/L (ref 1–32)
BASOPHILS # BLD AUTO: 0.03 10*3/MM3 (ref 0–0.2)
BASOPHILS NFR BLD AUTO: 0.3 % (ref 0–1.5)
BILIRUB SERPL-MCNC: 0.3 MG/DL (ref 0–1.2)
BUN SERPL-MCNC: 16 MG/DL (ref 8–23)
BUN/CREAT SERPL: 13.4 (ref 7–25)
CALCIUM SPEC-SCNC: 10 MG/DL (ref 8.6–10.5)
CANCER AG125 SERPL QL: 25.7 U/ML (ref 0–38.1)
CHLORIDE SERPL-SCNC: 101 MMOL/L (ref 98–107)
CO2 SERPL-SCNC: 26 MMOL/L (ref 22–29)
CREAT SERPL-MCNC: 1.19 MG/DL (ref 0.57–1)
DEPRECATED RDW RBC AUTO: 39.5 FL (ref 37–54)
EGFRCR SERPLBLD CKD-EPI 2021: 52.1 ML/MIN/1.73
EOSINOPHIL # BLD AUTO: 0.02 10*3/MM3 (ref 0–0.4)
EOSINOPHIL NFR BLD AUTO: 0.2 % (ref 0.3–6.2)
ERYTHROCYTE [DISTWIDTH] IN BLOOD BY AUTOMATED COUNT: 12.4 % (ref 12.3–15.4)
GLOBULIN UR ELPH-MCNC: 3.1 GM/DL
GLUCOSE SERPL-MCNC: 96 MG/DL (ref 65–99)
HCT VFR BLD AUTO: 39.3 % (ref 34–46.6)
HGB BLD-MCNC: 13.1 G/DL (ref 12–15.9)
IMM GRANULOCYTES # BLD AUTO: 0.02 10*3/MM3 (ref 0–0.05)
IMM GRANULOCYTES NFR BLD AUTO: 0.2 % (ref 0–0.5)
LYMPHOCYTES # BLD AUTO: 2.38 10*3/MM3 (ref 0.7–3.1)
LYMPHOCYTES NFR BLD AUTO: 25.7 % (ref 19.6–45.3)
MCH RBC QN AUTO: 29 PG (ref 26.6–33)
MCHC RBC AUTO-ENTMCNC: 33.3 G/DL (ref 31.5–35.7)
MCV RBC AUTO: 86.9 FL (ref 79–97)
MONOCYTES # BLD AUTO: 0.68 10*3/MM3 (ref 0.1–0.9)
MONOCYTES NFR BLD AUTO: 7.4 % (ref 5–12)
NEUTROPHILS NFR BLD AUTO: 6.12 10*3/MM3 (ref 1.7–7)
NEUTROPHILS NFR BLD AUTO: 66.2 % (ref 42.7–76)
NRBC BLD AUTO-RTO: 0 /100 WBC (ref 0–0.2)
PLATELET # BLD AUTO: 234 10*3/MM3 (ref 140–450)
PMV BLD AUTO: 9.8 FL (ref 6–12)
POTASSIUM SERPL-SCNC: 3.8 MMOL/L (ref 3.5–5.2)
PROT SERPL-MCNC: 7.3 G/DL (ref 6–8.5)
RBC # BLD AUTO: 4.52 10*6/MM3 (ref 3.77–5.28)
SODIUM SERPL-SCNC: 139 MMOL/L (ref 136–145)
WBC NRBC COR # BLD AUTO: 9.25 10*3/MM3 (ref 3.4–10.8)

## 2025-05-12 PROCEDURE — 36415 COLL VENOUS BLD VENIPUNCTURE: CPT

## 2025-05-12 PROCEDURE — 86304 IMMUNOASSAY TUMOR CA 125: CPT

## 2025-05-12 PROCEDURE — 80053 COMPREHEN METABOLIC PANEL: CPT

## 2025-05-12 PROCEDURE — 85025 COMPLETE CBC W/AUTO DIFF WBC: CPT

## 2025-05-12 NOTE — TELEPHONE ENCOUNTER
Caller: Tiff Elizalde    Relationship: Self    Best call back number: [phone]    What is the best time to reach you: ANYTIME    Who are you requesting to speak with (clinical staff, provider,  specific staff member): DR GIL / CLINICAL    What was the call regarding: PT HAD LABS DONE WITH DR ELE'S OFFICE AND WAS ADVISED HER  CAME BACK AT 42, SHE IS WANTING TO KNOW IF SHE CAN COME INTO THE OFFICE TO HAVE THAT LAB REDRAWN TO MAKE SURE IT IS CORRECT.    REQUESTING CALL BACK TO ADVISE ASAP TO ADVISE

## 2025-05-13 ENCOUNTER — TELEPHONE (OUTPATIENT)
Dept: ONCOLOGY | Facility: CLINIC | Age: 60
End: 2025-05-13
Payer: COMMERCIAL

## 2025-05-13 NOTE — TELEPHONE ENCOUNTER
Caller: Tiff Elizalde    Relationship: Self    Best call back number: [phone]     What is the best time to reach you: ANYTIME    Who are you requesting to speak with (clinical staff, provider,  specific staff member): CLINICAL    What was the call regarding: PATIENT JUST WANTED DR GIL TO KNOW HER LAB RESULTS FROM YESTERDAY WERE PRADEEP AND SHE IS VERY APPRECIATIVE OF RERUNNING THE TEST.

## 2025-06-20 ENCOUNTER — HOSPITAL ENCOUNTER (OUTPATIENT)
Dept: CT IMAGING | Facility: HOSPITAL | Age: 60
Discharge: HOME | End: 2025-06-20
Payer: COMMERCIAL

## 2025-06-20 ENCOUNTER — HOSPITAL ENCOUNTER (OUTPATIENT)
Dept: ULTRASOUND IMAGING | Facility: HOSPITAL | Age: 60
Discharge: HOME | End: 2025-06-20
Payer: COMMERCIAL

## 2025-06-20 DIAGNOSIS — C56.2 MALIGNANT NEOPLASM OF LEFT OVARY: ICD-10-CM

## 2025-06-20 PROCEDURE — 93971 EXTREMITY STUDY: CPT

## 2025-06-20 PROCEDURE — 71250 CT THORAX DX C-: CPT

## 2025-08-22 ENCOUNTER — LAB (OUTPATIENT)
Dept: LAB | Facility: HOSPITAL | Age: 60
End: 2025-08-22
Payer: COMMERCIAL

## 2025-08-22 ENCOUNTER — TRANSCRIBE ORDERS (OUTPATIENT)
Dept: ADMINISTRATIVE | Facility: HOSPITAL | Age: 60
End: 2025-08-22
Payer: COMMERCIAL

## 2025-08-22 ENCOUNTER — TELEPHONE (OUTPATIENT)
Dept: ONCOLOGY | Facility: CLINIC | Age: 60
End: 2025-08-22
Payer: COMMERCIAL

## 2025-08-22 DIAGNOSIS — C56.2 MALIGNANT NEOPLASM OF LEFT OVARY: Primary | ICD-10-CM

## 2025-08-22 DIAGNOSIS — I10 HYPERTENSION NOT AT GOAL: Primary | ICD-10-CM

## 2025-08-29 ENCOUNTER — OFFICE VISIT (OUTPATIENT)
Dept: ONCOLOGY | Facility: CLINIC | Age: 60
End: 2025-08-29
Payer: COMMERCIAL

## 2025-08-29 VITALS
BODY MASS INDEX: 29.98 KG/M2 | OXYGEN SATURATION: 98 % | TEMPERATURE: 96.8 F | SYSTOLIC BLOOD PRESSURE: 122 MMHG | RESPIRATION RATE: 16 BRPM | HEART RATE: 60 BPM | HEIGHT: 67 IN | DIASTOLIC BLOOD PRESSURE: 76 MMHG | WEIGHT: 191 LBS

## 2025-08-29 DIAGNOSIS — C56.2 MALIGNANT NEOPLASM OF LEFT OVARY: Primary | ICD-10-CM

## 2025-08-29 RX ORDER — APIXABAN 5 MG/1
5 TABLET, FILM COATED ORAL EVERY 12 HOURS SCHEDULED
Qty: 60 TABLET | Refills: 2 | Status: SHIPPED | OUTPATIENT
Start: 2025-08-29

## (undated) DEVICE — STRIP,CLOSURE,WOUND,MEDI-STRIP,1/2X4: Brand: MEDLINE

## (undated) DEVICE — GLOVE,SURG,SENSICARE,ALOE,LF,PF,6: Brand: MEDLINE

## (undated) DEVICE — CATH FLSH OMNI SFT 5F 90CM

## (undated) DEVICE — PAD MINOR UNIVERSAL: Brand: MEDLINE INDUSTRIES, INC.

## (undated) DEVICE — INFLATION DEVICE: Brand: ENCORE™ 26

## (undated) DEVICE — BALN EVERCROSS OTW .035 7F 12X40MM 135CM

## (undated) DEVICE — CVR UNIV C/ARM

## (undated) DEVICE — BAPTIST TURNOVER KIT: Brand: MEDLINE INDUSTRIES, INC.

## (undated) DEVICE — APPL CHLORAPREP HI/LITE 26ML ORNG

## (undated) DEVICE — NDL HYPO PRECISIONGLIDE REG 25G 1 1/2

## (undated) DEVICE — DRSNG SURESITE WNDW 4X4.5

## (undated) DEVICE — SYR SLP TP 10ML DISP

## (undated) DEVICE — KT SNAR RETRV 2SHEATH 90D 6F 20MM

## (undated) DEVICE — SPNG GZ 2S 2X2 8PLY STRL PK/2

## (undated) DEVICE — SUT MONOCRYL 4/0 PS2 27IN Y426H ETY426H

## (undated) DEVICE — SYR LL TP 10ML STRL

## (undated) DEVICE — ADHS SKIN PREMIERPRO EXOFIN TOPICAL HI/VISC .5ML

## (undated) DEVICE — TRAP FLD MINIVAC MEGADYNE 100ML

## (undated) DEVICE — SPNG GZ STRL 2S 4X4 12PLY

## (undated) DEVICE — RADIFOCUS GLIDEWIRE ADVANTAGE GUIDEWIRE: Brand: GLIDEWIRE ADVANTAGE

## (undated) DEVICE — PAD ENDOVASCULAR: Brand: MEDLINE INDUSTRIES, INC.

## (undated) DEVICE — INTENDED FOR TISSUE SEPARATION, AND OTHER PROCEDURES THAT REQUIRE A SHARP SURGICAL BLADE TO PUNCTURE OR CUT.: Brand: BARD-PARKER ®  SAFETY SCALPED

## (undated) DEVICE — ANTIBACTERIAL UNDYED BRAIDED (POLYGLACTIN 910), SYNTHETIC ABSORBABLE SUTURE: Brand: COATED VICRYL

## (undated) DEVICE — STERILE (14X122CM) TELESCOPICALLY-FOLDED COVER: Brand: CIV-CLEAR™ TRANSDUCER COVER

## (undated) DEVICE — NAVICROSS SUPPORT CATHETER: Brand: NAVICROSS

## (undated) DEVICE — GLV SURG BIOGEL M LTX PF 7 1/2

## (undated) DEVICE — PROXIMATE RH ROTATING HEAD SKIN STAPLERS (35 WIDE) CONTAINS 35 STAINLESS STEEL STAPLES: Brand: PROXIMATE

## (undated) DEVICE — TOWEL,OR,DSP,ST,NATURAL,DLX,4/PK,20PK/CS: Brand: MEDLINE

## (undated) DEVICE — PINNACLE R/O II INTRODUCER SHEATH WITH RADIOPAQUE MARKER: Brand: PINNACLE

## (undated) DEVICE — 4-PORT MANIFOLD: Brand: NEPTUNE 2

## (undated) DEVICE — SYR LUERLOK 20CC BX/50

## (undated) DEVICE — GLV SURG SENSICARE W/ALOE PF LF 6.5 STRL

## (undated) DEVICE — SUT PROLN 2/0 SH 36IN 8523H

## (undated) DEVICE — MAJOR DOUBLE BASIN W/GOWNS II: Brand: MEDLINE INDUSTRIES, INC.

## (undated) DEVICE — ST MIC/INTRO ACC SHRP/NDL TUNG/TP NITNL 5F 45CM 7CM

## (undated) DEVICE — SUT SILK 2/0 SH 30IN K833H

## (undated) DEVICE — SYR LUERLOK 30CC

## (undated) DEVICE — GLV SURG SENSICARE W/ALOE PF LF 7.5 STRL

## (undated) DEVICE — ADHS LIQ MASTISOL 2/3ML

## (undated) DEVICE — PACK,UNIVERSAL,NO GOWNS: Brand: MEDLINE

## (undated) DEVICE — ELECTRD BLD EZ CLN MOD XLNG 2.75IN